# Patient Record
Sex: FEMALE | Race: OTHER | HISPANIC OR LATINO | URBAN - METROPOLITAN AREA
[De-identification: names, ages, dates, MRNs, and addresses within clinical notes are randomized per-mention and may not be internally consistent; named-entity substitution may affect disease eponyms.]

---

## 2016-05-12 RX ORDER — WARFARIN SODIUM 2.5 MG/1
1 TABLET ORAL
Qty: 0 | Refills: 0 | DISCHARGE
Start: 2016-05-12

## 2017-07-09 ENCOUNTER — INPATIENT (INPATIENT)
Facility: HOSPITAL | Age: 77
LOS: 2 days | Discharge: HOME | DRG: 392 | End: 2017-07-12
Attending: INTERNAL MEDICINE | Admitting: INTERNAL MEDICINE
Payer: COMMERCIAL

## 2017-07-09 VITALS
HEART RATE: 62 BPM | SYSTOLIC BLOOD PRESSURE: 181 MMHG | DIASTOLIC BLOOD PRESSURE: 50 MMHG | TEMPERATURE: 99 F | HEIGHT: 61 IN | RESPIRATION RATE: 20 BRPM | WEIGHT: 160.06 LBS | OXYGEN SATURATION: 98 %

## 2017-07-09 LAB
ALBUMIN SERPL ELPH-MCNC: 3.5 G/DL — SIGNIFICANT CHANGE UP (ref 3.5–5)
ANION GAP SERPL CALC-SCNC: 7 MMOL/L — SIGNIFICANT CHANGE UP (ref 5–17)
BASOPHILS # BLD AUTO: 0.1 K/UL — SIGNIFICANT CHANGE UP (ref 0–0.2)
BASOPHILS NFR BLD AUTO: 1.1 % — SIGNIFICANT CHANGE UP (ref 0–2)
BUN SERPL-MCNC: 19 MG/DL — HIGH (ref 7–18)
CALCIUM SERPL-MCNC: 8.2 MG/DL — LOW (ref 8.4–10.5)
CHLORIDE SERPL-SCNC: 106 MMOL/L — SIGNIFICANT CHANGE UP (ref 96–108)
CO2 SERPL-SCNC: 29 MMOL/L — SIGNIFICANT CHANGE UP (ref 22–31)
EOSINOPHIL # BLD AUTO: 0.2 K/UL — SIGNIFICANT CHANGE UP (ref 0–0.5)
EOSINOPHIL NFR BLD AUTO: 3.3 % — SIGNIFICANT CHANGE UP (ref 0–6)
GLUCOSE SERPL-MCNC: 89 MG/DL — SIGNIFICANT CHANGE UP (ref 70–99)
HCT VFR BLD CALC: 37.3 % — SIGNIFICANT CHANGE UP (ref 34.5–45)
HGB BLD-MCNC: 12 G/DL — SIGNIFICANT CHANGE UP (ref 11.5–15.5)
LYMPHOCYTES # BLD AUTO: 2.2 K/UL — SIGNIFICANT CHANGE UP (ref 1–3.3)
LYMPHOCYTES # BLD AUTO: 37.4 % — SIGNIFICANT CHANGE UP (ref 13–44)
MCHC RBC-ENTMCNC: 30.2 PG — SIGNIFICANT CHANGE UP (ref 27–34)
MCHC RBC-ENTMCNC: 32.2 GM/DL — SIGNIFICANT CHANGE UP (ref 32–36)
MCV RBC AUTO: 94.1 FL — SIGNIFICANT CHANGE UP (ref 80–100)
MONOCYTES # BLD AUTO: 0.4 K/UL — SIGNIFICANT CHANGE UP (ref 0–0.9)
MONOCYTES NFR BLD AUTO: 7.2 % — SIGNIFICANT CHANGE UP (ref 2–14)
NEUTROPHILS # BLD AUTO: 2.9 K/UL — SIGNIFICANT CHANGE UP (ref 1.8–7.4)
NEUTROPHILS NFR BLD AUTO: 51 % — SIGNIFICANT CHANGE UP (ref 43–77)
PLATELET # BLD AUTO: 183 K/UL — SIGNIFICANT CHANGE UP (ref 150–400)
POTASSIUM SERPL-MCNC: 4 MMOL/L — SIGNIFICANT CHANGE UP (ref 3.5–5.3)
POTASSIUM SERPL-SCNC: 4 MMOL/L — SIGNIFICANT CHANGE UP (ref 3.5–5.3)
RBC # BLD: 3.96 M/UL — SIGNIFICANT CHANGE UP (ref 3.8–5.2)
RBC # FLD: 14.1 % — SIGNIFICANT CHANGE UP (ref 10.3–14.5)
SODIUM SERPL-SCNC: 142 MMOL/L — SIGNIFICANT CHANGE UP (ref 135–145)
WBC # BLD: 5.8 K/UL — SIGNIFICANT CHANGE UP (ref 3.8–10.5)
WBC # FLD AUTO: 5.8 K/UL — SIGNIFICANT CHANGE UP (ref 3.8–10.5)

## 2017-07-09 PROCEDURE — 71010: CPT | Mod: 26

## 2017-07-09 NOTE — ED ADULT NURSE NOTE - OBJECTIVE STATEMENT
Pt states that she is having chest pain, sob that has been ongoing for the past 3-4 days. denies numbness and tingling on arms. HX bronchitis

## 2017-07-10 DIAGNOSIS — R06.02 SHORTNESS OF BREATH: ICD-10-CM

## 2017-07-10 DIAGNOSIS — I82.409 ACUTE EMBOLISM AND THROMBOSIS OF UNSPECIFIED DEEP VEINS OF UNSPECIFIED LOWER EXTREMITY: ICD-10-CM

## 2017-07-10 DIAGNOSIS — I10 ESSENTIAL (PRIMARY) HYPERTENSION: ICD-10-CM

## 2017-07-10 DIAGNOSIS — E11.9 TYPE 2 DIABETES MELLITUS WITHOUT COMPLICATIONS: ICD-10-CM

## 2017-07-10 DIAGNOSIS — Z29.9 ENCOUNTER FOR PROPHYLACTIC MEASURES, UNSPECIFIED: ICD-10-CM

## 2017-07-10 DIAGNOSIS — R05 COUGH: ICD-10-CM

## 2017-07-10 LAB
24R-OH-CALCIDIOL SERPL-MCNC: 24.6 NG/ML — LOW (ref 30–100)
ALP SERPL-CCNC: 75 U/L — SIGNIFICANT CHANGE UP (ref 40–120)
ALT FLD-CCNC: 21 U/L DA — SIGNIFICANT CHANGE UP (ref 10–60)
APTT BLD: 44.4 SEC — HIGH (ref 27.5–37.4)
AST SERPL-CCNC: 20 U/L — SIGNIFICANT CHANGE UP (ref 10–40)
BILIRUB SERPL-MCNC: 0.3 MG/DL — SIGNIFICANT CHANGE UP (ref 0.2–1.2)
CHOLEST SERPL-MCNC: 167 MG/DL — SIGNIFICANT CHANGE UP (ref 10–199)
CK MB BLD-MCNC: 1.4 % — SIGNIFICANT CHANGE UP (ref 0–3.5)
CK MB BLD-MCNC: 1.5 % — SIGNIFICANT CHANGE UP (ref 0–3.5)
CK MB CFR SERPL CALC: 1.4 NG/ML — SIGNIFICANT CHANGE UP (ref 0–3.6)
CK MB CFR SERPL CALC: 1.8 NG/ML — SIGNIFICANT CHANGE UP (ref 0–3.6)
CK SERPL-CCNC: 103 U/L — SIGNIFICANT CHANGE UP (ref 21–215)
CK SERPL-CCNC: 117 U/L — SIGNIFICANT CHANGE UP (ref 21–215)
CREAT SERPL-MCNC: 1.21 MG/DL — SIGNIFICANT CHANGE UP (ref 0.5–1.3)
FOLATE SERPL-MCNC: >20 NG/ML — SIGNIFICANT CHANGE UP (ref 4.8–24.2)
HBA1C BLD-MCNC: 7.1 % — HIGH (ref 4–5.6)
HDLC SERPL-MCNC: 20 MG/DL — LOW (ref 40–125)
INR BLD: 2.23 RATIO — HIGH (ref 0.88–1.16)
LIPID PNL WITH DIRECT LDL SERPL: 69 MG/DL — SIGNIFICANT CHANGE UP
NT-PROBNP SERPL-SCNC: 144 PG/ML — SIGNIFICANT CHANGE UP (ref 0–450)
PROT SERPL-MCNC: 7.3 G/DL — SIGNIFICANT CHANGE UP (ref 6–8.3)
PROTHROM AB SERPL-ACNC: 24.7 SEC — HIGH (ref 9.8–12.7)
RAPID RVP RESULT: SIGNIFICANT CHANGE UP
TOTAL CHOLESTEROL/HDL RATIO MEASUREMENT: 8.4 RATIO — HIGH (ref 3.3–7.1)
TRIGL SERPL-MCNC: 388 MG/DL — HIGH (ref 10–149)
TROPONIN I SERPL-MCNC: 0.04 NG/ML — SIGNIFICANT CHANGE UP (ref 0–0.04)
TROPONIN I SERPL-MCNC: 0.04 NG/ML — SIGNIFICANT CHANGE UP (ref 0–0.04)
TSH SERPL-MCNC: 1.5 UU/ML — SIGNIFICANT CHANGE UP (ref 0.34–4.82)
VIT B12 SERPL-MCNC: 202 PG/ML — LOW (ref 243–894)

## 2017-07-10 PROCEDURE — 71275 CT ANGIOGRAPHY CHEST: CPT | Mod: 26

## 2017-07-10 PROCEDURE — 99285 EMERGENCY DEPT VISIT HI MDM: CPT

## 2017-07-10 PROCEDURE — 99223 1ST HOSP IP/OBS HIGH 75: CPT | Mod: GC

## 2017-07-10 RX ORDER — HYDROCHLOROTHIAZIDE 25 MG
25 TABLET ORAL DAILY
Qty: 0 | Refills: 0 | Status: DISCONTINUED | OUTPATIENT
Start: 2017-07-10 | End: 2017-07-12

## 2017-07-10 RX ORDER — HYDROCHLOROTHIAZIDE 25 MG
25 TABLET ORAL ONCE
Qty: 0 | Refills: 0 | Status: COMPLETED | OUTPATIENT
Start: 2017-07-10 | End: 2017-07-10

## 2017-07-10 RX ORDER — METOPROLOL TARTRATE 50 MG
25 TABLET ORAL DAILY
Qty: 0 | Refills: 0 | Status: DISCONTINUED | OUTPATIENT
Start: 2017-07-10 | End: 2017-07-12

## 2017-07-10 RX ORDER — PREGABALIN 225 MG/1
1000 CAPSULE ORAL DAILY
Qty: 0 | Refills: 0 | Status: DISCONTINUED | OUTPATIENT
Start: 2017-07-10 | End: 2017-07-11

## 2017-07-10 RX ORDER — ASPIRIN/CALCIUM CARB/MAGNESIUM 324 MG
81 TABLET ORAL DAILY
Qty: 0 | Refills: 0 | Status: DISCONTINUED | OUTPATIENT
Start: 2017-07-10 | End: 2017-07-12

## 2017-07-10 RX ORDER — WARFARIN SODIUM 2.5 MG/1
4 TABLET ORAL ONCE
Qty: 0 | Refills: 0 | Status: COMPLETED | OUTPATIENT
Start: 2017-07-10 | End: 2017-07-10

## 2017-07-10 RX ORDER — INSULIN LISPRO 100/ML
VIAL (ML) SUBCUTANEOUS
Qty: 0 | Refills: 0 | Status: DISCONTINUED | OUTPATIENT
Start: 2017-07-10 | End: 2017-07-12

## 2017-07-10 RX ORDER — GABAPENTIN 400 MG/1
300 CAPSULE ORAL DAILY
Qty: 0 | Refills: 0 | Status: DISCONTINUED | OUTPATIENT
Start: 2017-07-10 | End: 2017-07-12

## 2017-07-10 RX ORDER — IPRATROPIUM/ALBUTEROL SULFATE 18-103MCG
3 AEROSOL WITH ADAPTER (GRAM) INHALATION ONCE
Qty: 0 | Refills: 0 | Status: COMPLETED | OUTPATIENT
Start: 2017-07-10 | End: 2017-07-10

## 2017-07-10 RX ORDER — PANTOPRAZOLE SODIUM 20 MG/1
40 TABLET, DELAYED RELEASE ORAL
Qty: 0 | Refills: 0 | Status: DISCONTINUED | OUTPATIENT
Start: 2017-07-10 | End: 2017-07-12

## 2017-07-10 RX ORDER — PREGABALIN 225 MG/1
1000 CAPSULE ORAL DAILY
Qty: 0 | Refills: 0 | Status: DISCONTINUED | OUTPATIENT
Start: 2017-07-10 | End: 2017-07-12

## 2017-07-10 RX ADMIN — Medication 3 MILLILITER(S): at 02:15

## 2017-07-10 RX ADMIN — Medication 100 MILLIGRAM(S): at 11:17

## 2017-07-10 RX ADMIN — Medication 25 MILLIGRAM(S): at 11:11

## 2017-07-10 RX ADMIN — GABAPENTIN 300 MILLIGRAM(S): 400 CAPSULE ORAL at 11:12

## 2017-07-10 RX ADMIN — Medication 2: at 12:20

## 2017-07-10 RX ADMIN — Medication 100 MILLIGRAM(S): at 17:48

## 2017-07-10 RX ADMIN — Medication 81 MILLIGRAM(S): at 11:12

## 2017-07-10 RX ADMIN — Medication 1: at 17:48

## 2017-07-10 RX ADMIN — WARFARIN SODIUM 4 MILLIGRAM(S): 2.5 TABLET ORAL at 21:16

## 2017-07-10 RX ADMIN — Medication 25 MILLIGRAM(S): at 03:01

## 2017-07-10 RX ADMIN — PANTOPRAZOLE SODIUM 40 MILLIGRAM(S): 20 TABLET, DELAYED RELEASE ORAL at 11:11

## 2017-07-10 NOTE — H&P ADULT - GASTROINTESTINAL DETAILS
no rigidity/no guarding/no bruit/no rebound tenderness/bowel sounds normal/nontender/no organomegaly/no distention/no masses palpable/normal/soft

## 2017-07-10 NOTE — ED PROVIDER NOTE - OBJECTIVE STATEMENT
"I'm having trouble breathing"  76 year old with complaint of three days of non productive cough, asst with sob, hx of bronchitis/pneumonia about one year ago with similar symptoms,  pt states smoked 40 years ago.  no fever no chills, no PND, no orthopnea, SOB with exertion , no leg pain or edema.  had dvt in the past on coumadin

## 2017-07-10 NOTE — H&P ADULT - RS GEN PE MLT RESP DETAILS PC
breath sounds equal/no wheezes/no subcutaneous emphysema/normal/no rales/clear to auscultation bilaterally/no chest wall tenderness/airway patent/no rhonchi/respirations non-labored/no intercostal retractions/good air movement

## 2017-07-10 NOTE — ED PROVIDER NOTE - PSH
S/P appendectomy    S/P colon resection    S/P eye surgery, follow-up exam  right eye  S/P partial hysterectomy    Total knee replacement status  left

## 2017-07-10 NOTE — H&P ADULT - NEUROLOGICAL DETAILS
sensation intact/deep reflexes intact/no spontaneous movement/normal strength/alert and oriented x 3/cranial nerves intact/superficial reflexes intact

## 2017-07-10 NOTE — ED PROVIDER NOTE - PMH
Deep vein thrombosis (DVT), right    DM (diabetes mellitus)    HLD (hyperlipidemia)    HTN (hypertension)    Knee pain, chronic, right    Neuropathy of lower extremity    RA (rheumatoid arthritis)    Reflux

## 2017-07-10 NOTE — H&P ADULT - NSHPLABSRESULTS_GEN_ALL_CORE
12.0   5.8   )-----------( 183      ( 09 Jul 2017 23:34 )             37.3       07-09    142  |  106  |  19<H>  ----------------------------<  89  4.0   |  29  |  1.21    Ca    8.2<L>      09 Jul 2017 23:34    TPro  7.3  /  Alb  3.5  /  TBili  0.3  /  DBili  x   /  AST  20  /  ALT  21  /  AlkPhos  75  07-09                  PT/INR - ( 10 Jul 2017 02:26 )   PT: 24.7 sec;   INR: 2.23 ratio         PTT - ( 10 Jul 2017 02:26 )  PTT:44.4 sec        CARDIAC MARKERS ( 09 Jul 2017 23:34 )  0.038 ng/mL / x     / 117 U/L / x     / 1.8 ng/mL        CAPILLARY BLOOD GLUCOSE: 90    RAPID RVP RESULT: NON REACTIVE    CT ANGIO CHEST W/ IV CONTRAST:    FINDINGS:    CHEST:     LUNGS AND LARGE AIRWAYS: Patent central airways. No pulmonary   consolidation or nodules. Calcified granuloma in the right lower lobe  PLEURA: No pleural effusion.  VESSELS: No evidence for pulmonary embolism.  HEART: Heart size is normal.No pericardial effusion.  MEDIASTINUM AND CAT: No lymphadenopathy.  CHEST WALL AND LOWER NECK: Right thyroid lobe hypodense lesion measures   4.0 x 3.8 cm and exerts mild leftward deviation of the trachea.  VISUALIZED UPPER ABDOMEN: Calcified granulomata in the spleen.  BONES: Degenerative changes of the spine.    IMPRESSION: No evidence for pulmonary embolism. EKG: sinus bradycardia 56 bpm    12.0   5.8   )-----------( 183      ( 09 Jul 2017 23:34 )             37.3       07-09    142  |  106  |  19<H>  ----------------------------<  89  4.0   |  29  |  1.21    Ca    8.2<L>      09 Jul 2017 23:34    TPro  7.3  /  Alb  3.5  /  TBili  0.3  /  DBili  x   /  AST  20  /  ALT  21  /  AlkPhos  75  07-09                  PT/INR - ( 10 Jul 2017 02:26 )   PT: 24.7 sec;   INR: 2.23 ratio         PTT - ( 10 Jul 2017 02:26 )  PTT:44.4 sec        CARDIAC MARKERS ( 09 Jul 2017 23:34 )  0.038 ng/mL / x     / 117 U/L / x     / 1.8 ng/mL        CAPILLARY BLOOD GLUCOSE: 90    RAPID RVP RESULT: NON REACTIVE    CT ANGIO CHEST W/ IV CONTRAST:    FINDINGS:    CHEST:     LUNGS AND LARGE AIRWAYS: Patent central airways. No pulmonary   consolidation or nodules. Calcified granuloma in the right lower lobe  PLEURA: No pleural effusion.  VESSELS: No evidence for pulmonary embolism.  HEART: Heart size is normal.No pericardial effusion.  MEDIASTINUM AND CAT: No lymphadenopathy.  CHEST WALL AND LOWER NECK: Right thyroid lobe hypodense lesion measures   4.0 x 3.8 cm and exerts mild leftward deviation of the trachea.  VISUALIZED UPPER ABDOMEN: Calcified granulomata in the spleen.  BONES: Degenerative changes of the spine.    IMPRESSION: No evidence for pulmonary embolism.

## 2017-07-10 NOTE — PROGRESS NOTE ADULT - SUBJECTIVE AND OBJECTIVE BOX
HPI:  77 yo F who comes to ED with son, PMHx of DVT right leg, currently on Coumadin (for one year) HTN, DMT2, RA, diverticulosis s/p colectomy, HLD, GERD c/o shortness of breath and non-productive cough for the past 4-5 days. Patient states waking up coughing which gets worse throughout the day, nothing alleviates it. Associated with orthopnea. Patient had a similar episode last year, for which was diagnosed with bronchitis. She is compliant with medications. Patient used to smoke tobacco 50 yrs ago. Refers being up to date on vaccinations: Influenza and Pneumovax.    Denies fever, chills, chest pain, hemoptysis, nausea, vomiting, paroxysmal nocturnal dyspnea, hematochezia, trauma, fall, current smoking, alcohol use, illicit drug use.     Vital Signs Last 24 Hrs    T(F): 98.4 (09 Jul 2017 23:56), Max: 98.8 (09 Jul 2017 21:53)  HR: 63 (10 Jul 2017 02:16) (60 - 63)  BP: 175/68 (10 Jul 2017 02:16) (173/91 - 181/50)  RR: 20 (09 Jul 2017 23:56) (20 - 20)  SpO2: 100% (09 Jul 2017 23:56) (98% - 100%) (10 Jul 2017 04:59)      Patient is a 76y old  Female who presents with a chief complaint of non productive cough with shortness of breath (10 Jul 2017 04:59)      INTERVAL HPI/OVERNIGHT EVENTS:  T(C): 36.6 (07-10-17 @ 16:39), Max: 37.1 (07-09-17 @ 21:53)  HR: 51 (07-10-17 @ 16:39) (51 - 63)  BP: 136/61 (07-10-17 @ 16:39) (112/67 - 181/50)  RR: 18 (07-10-17 @ 16:39) (16 - 20)  SpO2: 100% (07-10-17 @ 16:39) (98% - 100%)  Wt(kg): --  I&O's Summary      REVIEW OF SYSTEMS: denies fever, chills, SOB, palpitations, chest pain, abdominal pain, nausea, vomitting, diarrhea, constipation, dizziness    MEDICATIONS  (STANDING):    ED Provider note lists Cozaar 100mg daily as medication      aspirin enteric coated 81 milliGRAM(s) Oral daily  gabapentin 300 milliGRAM(s) Oral daily  metoprolol succinate ER 25 milliGRAM(s) Oral daily  hydrochlorothiazide 25 milliGRAM(s) Oral daily  warfarin 4 milliGRAM(s) Oral once  insulin lispro (HumaLOG) corrective regimen sliding scale   SubCutaneous three times a day before meals  guaiFENesin   Syrup  (Sugar-Free) 100 milliGRAM(s) Oral every 6 hours  pantoprazole    Tablet 40 milliGRAM(s) Oral before breakfast  cyanocobalamin 1000 MICROGram(s) Oral daily  cyanocobalamin Injectable 1000 MICROGram(s) SubCutaneous daily    MEDICATIONS  (PRN):      PHYSICAL EXAM:  GENERAL: NAD, well-groomed, well-developed  HEAD:  Atraumatic, Normocephalic  EYES: EOMI, PERRLA, conjunctiva and sclera clear  ENMT: No tonsillar erythema, exudates, or enlargement; Moist mucous membranes, Good dentition, No lesions  NECK: Supple, No JVD, Normal thyroid  NERVOUS SYSTEM:  Alert & Oriented X3, Good concentration; Motor Strength 5/5 B/L upper and lower extremities; DTRs 2+ intact and symmetric  CHEST/LUNG: Clear to percussion bilaterally; No rales, rhonchi, wheezing, or rubs  HEART: Regular rate and rhythm; No murmurs, rubs, or gallops  ABDOMEN: Soft, Nontender, Nondistended; Bowel sounds present  EXTREMITIES:  2+ Peripheral Pulses, No clubbing, cyanosis, or edema  LYMPH: No lymphadenopathy noted  SKIN: No rashes or lesions  LABS:                        12.0   5.8   )-----------( 183      ( 09 Jul 2017 23:34 )             37.3     07-09    142  |  106  |  19<H>  ----------------------------<  89  4.0   |  29  |  1.21    Ca    8.2<L>      09 Jul 2017 23:34    TPro  7.3  /  Alb  3.5  /  TBili  0.3  /  DBili  x   /  AST  20  /  ALT  21  /  AlkPhos  75  07-09    PT/INR - ( 10 Jul 2017 02:26 )   PT: 24.7 sec;   INR: 2.23 ratio         PTT - ( 10 Jul 2017 02:26 )  PTT:44.4 sec    CAPILLARY BLOOD GLUCOSE  157 (10 Jul 2017 16:39)  217 (10 Jul 2017 11:29)  129 (10 Jul 2017 07:43)

## 2017-07-10 NOTE — H&P ADULT - HISTORY OF PRESENT ILLNESS
77 yo F who comes to ED with son, c/o shortness of breath and non-productive cough for the past 4-5 days. Patient states waking up coughing which gets worse throughout the day, nothing alleviates it. Has been sleeping with four pillows ever since. Patient had a similar episode last year, for which was diagnosed with bronchitis. PMHx of DVT, currently on Coumadin treatment. Denies fever, chills, chest pain, hemoptysis, nausea, vomiting. Patient used to smoke tobacco 50 yrs ago. Refers being up to date on vaccinations: Influenza and Pneumovax.    PAST MEDICAL & SURGICAL HISTORY:    Deep vein thrombosis (DVT), right  RA (rheumatoid arthritis)  Neuropathy of lower extremity  HLD (hyperlipidemia)  GERD  HTN (hypertension)  DMT2  Diverticulosis  Pulmonary Tuberculosis  S/P partial hysterectomy  Bilateral Total knee replacement  S/P appendectomy  S/P colon resection  S/P eye surgery, follow-up exam: right eye  Does not recall last time colonoscopy was done      FAMILY HISTORY:  DMT2: Parents and siblings  Cardiac disease: Parents   Stroke: Brother  CA: Sister  No family hx of bleeding disorder    Social History:    Marital Status:   Occupation:   Lives with:   Ambulates at home:    Substance Use:  Tobacco Usage:    Alcohol Usage:  Illicit Drug Usage:    Health Management     Last physical exam:  For female:   Last Mammo: 2016 (non remarkable)  Last Pap: 2013 (non remarkable)       Immunization Hx: Flu, Pneumonia, Tetanus, Hepatitis, Varicella. 77 yo F who comes to ED with son, PMHx of DVT right leg, currently on Coumadin (for one year) HTN, DMT2, RA, diverticulosis s/p colectomy, HLD, GERD c/o shortness of breath and non-productive cough for the past 4-5 days. Patient states waking up coughing which gets worse throughout the day, nothing alleviates it. Associated with orthopnea. Patient had a similar episode last year, for which was diagnosed with bronchitis. She is compliant with medications. Patient used to smoke tobacco 50 yrs ago. Refers being up to date on vaccinations: Influenza and Pneumovax.    Denies fever, chills, chest pain, hemoptysis, nausea, vomiting, paroxysmal nocturnal dyspnea, hematochezia, trauma, fall, current smoking, alcohol use, illicit drug use.     Vital Signs Last 24 Hrs    T(F): 98.4 (09 Jul 2017 23:56), Max: 98.8 (09 Jul 2017 21:53)  HR: 63 (10 Jul 2017 02:16) (60 - 63)  BP: 175/68 (10 Jul 2017 02:16) (173/91 - 181/50)  RR: 20 (09 Jul 2017 23:56) (20 - 20)  SpO2: 100% (09 Jul 2017 23:56) (98% - 100%)

## 2017-07-10 NOTE — H&P ADULT - PROBLEM SELECTOR PLAN 1
Likely secondary to GERD  Concerns for bronchitis  CTA chest negative for PE, shows calcified granuloma in right lower lobe  afebrile no leukocytosis  given one dose of levofloxacin in ED  will start patient on Robitussin

## 2017-07-10 NOTE — ED ADULT NURSE REASSESSMENT NOTE - NS ED NURSE REASSESS COMMENT FT1
Called lab- requested troponin and ckmb to be added.
assumed care of pt from previous RN Guille in stable condition. pt a&ox3, resting comfortably on stretcher in NAD. offers no complaints at this time. pending bed availability on unit.
Pt axox3 ambulatory gait steady no acute distress pain noted

## 2017-07-10 NOTE — ED PROVIDER NOTE - CARE PLAN
Principal Discharge DX:	Shortness of breath  Secondary Diagnosis:	DM (diabetes mellitus)  Secondary Diagnosis:	HTN (hypertension)

## 2017-07-10 NOTE — H&P ADULT - PROBLEM SELECTOR PLAN 2
CXR is negative for congestion or infiltrates  Saturating above 94% on room air CXR is negative for congestion or infiltrates  Saturating above 94% on room air  EKG: sinus bradycardia 56 bpm  CE negative x 2

## 2017-07-11 DIAGNOSIS — E04.9 NONTOXIC GOITER, UNSPECIFIED: ICD-10-CM

## 2017-07-11 DIAGNOSIS — E78.5 HYPERLIPIDEMIA, UNSPECIFIED: ICD-10-CM

## 2017-07-11 DIAGNOSIS — K21.9 GASTRO-ESOPHAGEAL REFLUX DISEASE WITHOUT ESOPHAGITIS: ICD-10-CM

## 2017-07-11 LAB
ALBUMIN SERPL ELPH-MCNC: 3.3 G/DL — LOW (ref 3.5–5)
ALP SERPL-CCNC: 72 U/L — SIGNIFICANT CHANGE UP (ref 40–120)
ALT FLD-CCNC: 19 U/L DA — SIGNIFICANT CHANGE UP (ref 10–60)
ANION GAP SERPL CALC-SCNC: 9 MMOL/L — SIGNIFICANT CHANGE UP (ref 5–17)
AST SERPL-CCNC: 17 U/L — SIGNIFICANT CHANGE UP (ref 10–40)
BILIRUB DIRECT SERPL-MCNC: 0.1 MG/DL — SIGNIFICANT CHANGE UP (ref 0–0.2)
BILIRUB INDIRECT FLD-MCNC: 0.2 MG/DL — SIGNIFICANT CHANGE UP (ref 0.2–1)
BILIRUB SERPL-MCNC: 0.3 MG/DL — SIGNIFICANT CHANGE UP (ref 0.2–1.2)
BUN SERPL-MCNC: 19 MG/DL — HIGH (ref 7–18)
CALCIUM SERPL-MCNC: 8.3 MG/DL — LOW (ref 8.4–10.5)
CHLORIDE SERPL-SCNC: 104 MMOL/L — SIGNIFICANT CHANGE UP (ref 96–108)
CO2 SERPL-SCNC: 28 MMOL/L — SIGNIFICANT CHANGE UP (ref 22–31)
CREAT SERPL-MCNC: 1.17 MG/DL — SIGNIFICANT CHANGE UP (ref 0.5–1.3)
GLUCOSE SERPL-MCNC: 165 MG/DL — HIGH (ref 70–99)
HCT VFR BLD CALC: 36.9 % — SIGNIFICANT CHANGE UP (ref 34.5–45)
HGB BLD-MCNC: 12.2 G/DL — SIGNIFICANT CHANGE UP (ref 11.5–15.5)
INR BLD: 1.62 RATIO — HIGH (ref 0.88–1.16)
MAGNESIUM SERPL-MCNC: 1.8 MG/DL — SIGNIFICANT CHANGE UP (ref 1.6–2.6)
MCHC RBC-ENTMCNC: 31.1 PG — SIGNIFICANT CHANGE UP (ref 27–34)
MCHC RBC-ENTMCNC: 33 GM/DL — SIGNIFICANT CHANGE UP (ref 32–36)
MCV RBC AUTO: 94.2 FL — SIGNIFICANT CHANGE UP (ref 80–100)
PHOSPHATE SERPL-MCNC: 2.9 MG/DL — SIGNIFICANT CHANGE UP (ref 2.5–4.5)
PLATELET # BLD AUTO: 174 K/UL — SIGNIFICANT CHANGE UP (ref 150–400)
POTASSIUM SERPL-MCNC: 3.8 MMOL/L — SIGNIFICANT CHANGE UP (ref 3.5–5.3)
POTASSIUM SERPL-SCNC: 3.8 MMOL/L — SIGNIFICANT CHANGE UP (ref 3.5–5.3)
PROT SERPL-MCNC: 7.1 G/DL — SIGNIFICANT CHANGE UP (ref 6–8.3)
PROTHROM AB SERPL-ACNC: 17.8 SEC — HIGH (ref 9.8–12.7)
RBC # BLD: 3.92 M/UL — SIGNIFICANT CHANGE UP (ref 3.8–5.2)
RBC # FLD: 14 % — SIGNIFICANT CHANGE UP (ref 10.3–14.5)
SODIUM SERPL-SCNC: 141 MMOL/L — SIGNIFICANT CHANGE UP (ref 135–145)
WBC # BLD: 5 K/UL — SIGNIFICANT CHANGE UP (ref 3.8–10.5)
WBC # FLD AUTO: 5 K/UL — SIGNIFICANT CHANGE UP (ref 3.8–10.5)

## 2017-07-11 PROCEDURE — 99233 SBSQ HOSP IP/OBS HIGH 50: CPT | Mod: GC

## 2017-07-11 RX ORDER — WARFARIN SODIUM 2.5 MG/1
5 TABLET ORAL ONCE
Qty: 0 | Refills: 0 | Status: COMPLETED | OUTPATIENT
Start: 2017-07-11 | End: 2017-07-11

## 2017-07-11 RX ADMIN — Medication 100 MILLIGRAM(S): at 17:17

## 2017-07-11 RX ADMIN — Medication 2: at 12:02

## 2017-07-11 RX ADMIN — PREGABALIN 1000 MICROGRAM(S): 225 CAPSULE ORAL at 12:03

## 2017-07-11 RX ADMIN — Medication 100 MILLIGRAM(S): at 23:07

## 2017-07-11 RX ADMIN — Medication 25 MILLIGRAM(S): at 06:04

## 2017-07-11 RX ADMIN — Medication 1: at 08:42

## 2017-07-11 RX ADMIN — Medication 81 MILLIGRAM(S): at 13:07

## 2017-07-11 RX ADMIN — PANTOPRAZOLE SODIUM 40 MILLIGRAM(S): 20 TABLET, DELAYED RELEASE ORAL at 06:04

## 2017-07-11 RX ADMIN — WARFARIN SODIUM 5 MILLIGRAM(S): 2.5 TABLET ORAL at 21:16

## 2017-07-11 RX ADMIN — Medication 100 MILLIGRAM(S): at 06:03

## 2017-07-11 RX ADMIN — GABAPENTIN 300 MILLIGRAM(S): 400 CAPSULE ORAL at 12:03

## 2017-07-11 RX ADMIN — Medication 100 MILLIGRAM(S): at 12:03

## 2017-07-11 RX ADMIN — Medication 1: at 17:17

## 2017-07-11 NOTE — CONSULT NOTE ADULT - PROBLEM SELECTOR RECOMMENDATION 3
Ch - 167 , TG - 388   consider starting Lipitor or gemfibrozil   f/u lipid profile 3-4 months after  c/w DASH diet

## 2017-07-11 NOTE — CONSULT NOTE ADULT - ASSESSMENT
77 yo F who comes to ED with son, PMHx of DVT right leg, currently on Coumadin (for one year) HTN, DMT2, RA, diverticulosis s/p colectomy, HLD, GERD c/o shortness of breath and non-productive cough for the past 4-5 days. Pt was seen by our service for right thyroid goiter

## 2017-07-11 NOTE — CONSULT NOTE ADULT - PROBLEM SELECTOR RECOMMENDATION 4
Goal BP < 140/90 mmhg  pt's BP well controlled   c/w FLAKO meds - metoprolol and HCTZ   c/w DASH diet

## 2017-07-11 NOTE — CONSULT NOTE ADULT - SUBJECTIVE AND OBJECTIVE BOX
Patient is a 76y old  Female who presents with a chief complaint of non productive cough with shortness of breath (10 Jul 2017 04:59)      HPI:  75 yo F who comes to ED with son, PMHx of DVT right leg, currently on Coumadin (for one year) HTN, DMT2, RA, diverticulosis s/p colectomy, HLD, GERD c/o shortness of breath and non-productive cough for the past 4-5 days. Pt was seen by our service for right thyroid goiter . She is having occasional palpitations , with no weight changes  , has constipation , feeling weak recently. No skin or nail or hair changes. No throat pain. No voice changes. No heat and cold intolerance. No history of taking amiodarone or lithium . No Hx radiation or surgery to neck , No FMhx Hyperthyroidism or thyroid cancer. No abdominal pain , No headache , No vomiting , No Dysuria , No diarrhea, No joint pain , No recent hospitalization or sickness. No chest pain. non smoker , non alcoholic ( Patient used to smoke tobacco 50 yrs ago.)     Vital Signs Last 24 Hrs    T(F): 98.4 (09 Jul 2017 23:56), Max: 98.8 (09 Jul 2017 21:53)  HR: 63 (10 Jul 2017 02:16) (60 - 63)  BP: 175/68 (10 Jul 2017 02:16) (173/91 - 181/50)  RR: 20 (09 Jul 2017 23:56) (20 - 20)  SpO2: 100% (09 Jul 2017 23:56) (98% - 100%) (10 Jul 2017 04:59)      PAST MEDICAL & SURGICAL HISTORY:  Knee pain, chronic, right  Deep vein thrombosis (DVT), right  RA (rheumatoid arthritis)  Neuropathy of lower extremity  HLD (hyperlipidemia)  Reflux  HTN (hypertension)  DM (diabetes mellitus)  S/P partial hysterectomy  Total knee replacement status: left  S/P appendectomy  S/P colon resection  S/P eye surgery, follow-up exam: right eye         MEDICATIONS  (STANDING):  aspirin enteric coated 81 milliGRAM(s) Oral daily  gabapentin 300 milliGRAM(s) Oral daily  metoprolol succinate ER 25 milliGRAM(s) Oral daily  hydrochlorothiazide 25 milliGRAM(s) Oral daily  insulin lispro (HumaLOG) corrective regimen sliding scale   SubCutaneous three times a day before meals  guaiFENesin   Syrup  (Sugar-Free) 100 milliGRAM(s) Oral every 6 hours  pantoprazole    Tablet 40 milliGRAM(s) Oral before breakfast  cyanocobalamin 1000 MICROGram(s) Oral daily  cyanocobalamin Injectable 1000 MICROGram(s) SubCutaneous daily    MEDICATIONS  (PRN):      FAMILY HISTORY:  Family history of diabetes mellitus (Sibling): Type 2      SOCIAL HISTORY:  as above       REVIEW OF SYSTEMS:  CONSTITUTIONAL: No fever, weight loss, or fatigue  EYES: No eye pain, visual disturbances, or discharge  ENT:  No difficulty hearing, tinnitus, vertigo; No sinus or throat pain  NECK: No pain or stiffness  RESPIRATORY: No cough, wheezing, chills or hemoptysis; No Shortness of Breath  CARDIOVASCULAR: No chest pain, palpitations, passing out, dizziness, or leg swelling  GASTROINTESTINAL: No abdominal or epigastric pain. No nausea, vomiting, or hematemesis; No diarrhea or constipation. No melena or hematochezia.  GENITOURINARY: No dysuria, frequency, hematuria, or incontinence  NEUROLOGICAL: No headaches, memory loss, loss of strength, numbness, or tremors  SKIN: No itching, burning, rashes, or lesions   LYMPH Nodes: No enlarged glands  ENDOCRINE: No heat or cold intolerance; No hair loss  MUSCULOSKELETAL: No joint pain or swelling; No muscle, back, or extremity pain  PSYCHIATRIC: No depression, anxiety, mood swings, or difficulty sleeping  HEME/LYMPH: No easy bruising, or bleeding gums  ALLERGY AND IMMUNOLOGIC: No hives or eczema	        Vital Signs Last 24 Hrs  T(C): 36.6 (11 Jul 2017 07:50), Max: 36.9 (10 Jul 2017 16:02)  T(F): 97.8 (11 Jul 2017 07:50), Max: 98.4 (10 Jul 2017 16:02)  HR: 52 (11 Jul 2017 07:50) (51 - 70)  BP: 126/57 (11 Jul 2017 07:50) (120/52 - 148/60)  BP(mean): --  RR: 16 (11 Jul 2017 07:50) (16 - 18)  SpO2: 98% (11 Jul 2017 07:50) (98% - 100%)      Constitutional:      HEENT:  NC/AT, MMM, EOMI , PERRLA    Neck:  Rt thyroid nodule palpable , No JVD, bruits or thyromegaly    Respiratory:  Clear without rales or rhonchi    Cardiovascular:  RR without murmur, rub or gallop.    Gastrointestinal: Soft without hepatosplenomegaly.    Extremities: without cyanosis, clubbing or edema.    Neurological:  Oriented   x      . No gross sensory or motor defects.        LABS:                        12.2   5.0   )-----------( 174      ( 11 Jul 2017 07:10 )             36.9     07-11    141  |  104  |  19<H>  ----------------------------<  165<H>  3.8   |  28  |  1.17    Ca    8.3<L>      11 Jul 2017 07:10  Phos  2.9     07-11  Mg     1.8     07-11    TPro  7.1  /  Alb  3.3<L>  /  TBili  0.3  /  DBili  0.1  /  AST  17  /  ALT  19  /  AlkPhos  72  07-11    CARDIAC MARKERS ( 10 Jul 2017 06:27 )  0.039 ng/mL / x     / 103 U/L / x     / 1.4 ng/mL  CARDIAC MARKERS ( 09 Jul 2017 23:34 )  0.038 ng/mL / x     / 117 U/L / x     / 1.8 ng/mL      PT/INR - ( 11 Jul 2017 07:10 )   PT: 17.8 sec;   INR: 1.62 ratio         PTT - ( 10 Jul 2017 02:26 )  PTT:44.4 sec    CAPILLARY BLOOD GLUCOSE  178 (11 Jul 2017 07:54)  189 (10 Jul 2017 21:02)  157 (10 Jul 2017 16:39)  217 (10 Jul 2017 11:29)          RADIOLOGY & ADDITIONAL STUDIES:  CTA - Rt thyroid hypodense lesion - 4.0 x 3.8 cm Patient is a 76y old  Female who presents with a chief complaint of non productive cough with shortness of breath (10 Jul 2017 04:59)      HPI:  77 yo F who comes to ED with son, PMHx of DVT right leg, currently on Coumadin (for one year) HTN, DMT2, RA, diverticulosis s/p colectomy, HLD, GERD c/o shortness of breath and non-productive cough for the past 4-5 days. Pt was seen by our service for right thyroid goiter . She is having occasional palpitations , with no weight changes  , has constipation , feeling weak recently. No skin or nail or hair changes. No throat pain. No voice changes. No heat and cold intolerance. No history of taking amiodarone or lithium . No Hx radiation or surgery to neck , No FMhx Hyperthyroidism or thyroid cancer. No abdominal pain , No headache , No vomiting , No Dysuria , No diarrhea, No joint pain , No recent hospitalization or sickness. No chest pain. non smoker , non alcoholic ( Patient used to smoke tobacco 50 yrs ago.)     Vital Signs Last 24 Hrs    T(F): 98.4 (09 Jul 2017 23:56), Max: 98.8 (09 Jul 2017 21:53)  HR: 63 (10 Jul 2017 02:16) (60 - 63)  BP: 175/68 (10 Jul 2017 02:16) (173/91 - 181/50)  RR: 20 (09 Jul 2017 23:56) (20 - 20)  SpO2: 100% (09 Jul 2017 23:56) (98% - 100%) (10 Jul 2017 04:59)      PAST MEDICAL & SURGICAL HISTORY:  Knee pain, chronic, right  Deep vein thrombosis (DVT), right  RA (rheumatoid arthritis)  Neuropathy of lower extremity  HLD (hyperlipidemia)  Reflux  HTN (hypertension)  DM (diabetes mellitus)  S/P partial hysterectomy  Total knee replacement status: left  S/P appendectomy  S/P colon resection  S/P eye surgery, follow-up exam: right eye         MEDICATIONS  (STANDING):  aspirin enteric coated 81 milliGRAM(s) Oral daily  gabapentin 300 milliGRAM(s) Oral daily  metoprolol succinate ER 25 milliGRAM(s) Oral daily  hydrochlorothiazide 25 milliGRAM(s) Oral daily  insulin lispro (HumaLOG) corrective regimen sliding scale   SubCutaneous three times a day before meals  guaiFENesin   Syrup  (Sugar-Free) 100 milliGRAM(s) Oral every 6 hours  pantoprazole    Tablet 40 milliGRAM(s) Oral before breakfast  cyanocobalamin 1000 MICROGram(s) Oral daily  cyanocobalamin Injectable 1000 MICROGram(s) SubCutaneous daily    MEDICATIONS  (PRN):      FAMILY HISTORY:  Family history of diabetes mellitus (Sibling): Type 2      SOCIAL HISTORY:  as above       REVIEW OF SYSTEMS:  CONSTITUTIONAL: No fever, weight loss, or fatigue  EYES: No eye pain, visual disturbances, or discharge  ENT:  No difficulty hearing, tinnitus, vertigo; No sinus or throat pain  NECK: No pain or stiffness  RESPIRATORY: No cough, wheezing, chills or hemoptysis; No Shortness of Breath  CARDIOVASCULAR: No chest pain, palpitations, passing out, dizziness, or leg swelling  GASTROINTESTINAL: No abdominal or epigastric pain. No nausea, vomiting, or hematemesis; No diarrhea or constipation. No melena or hematochezia.  GENITOURINARY: No dysuria, frequency, hematuria, or incontinence  NEUROLOGICAL: No headaches, memory loss, loss of strength, numbness, or tremors  SKIN: No itching, burning, rashes, or lesions   LYMPH Nodes: No enlarged glands  ENDOCRINE: No heat or cold intolerance; No hair loss  MUSCULOSKELETAL: No joint pain or swelling; No muscle, back, or extremity pain  PSYCHIATRIC: No depression, anxiety, mood swings, or difficulty sleeping  HEME/LYMPH: No easy bruising, or bleeding gums  ALLERGY AND IMMUNOLOGIC: No hives or eczema	        Vital Signs Last 24 Hrs  T(C): 36.6 (11 Jul 2017 07:50), Max: 36.9 (10 Jul 2017 16:02)  T(F): 97.8 (11 Jul 2017 07:50), Max: 98.4 (10 Jul 2017 16:02)  HR: 52 (11 Jul 2017 07:50) (51 - 70)  BP: 126/57 (11 Jul 2017 07:50) (120/52 - 148/60)  BP(mean): --  RR: 16 (11 Jul 2017 07:50) (16 - 18)  SpO2: 98% (11 Jul 2017 07:50) (98% - 100%)      Constitutional:      HEENT:  NC/AT, MMM, EOMI , PERRLA    Neck:  Rt thyroid nodule palpable , No JVD, bruits or thyromegaly    Respiratory:  Clear without rales or rhonchi    Cardiovascular:  RR without murmur, rub or gallop.    Gastrointestinal: Soft without hepatosplenomegaly.    Extremities: without cyanosis, clubbing or edema.    Neurological:  Oriented   x   3   . No gross sensory or motor defects.        LABS:                        12.2   5.0   )-----------( 174      ( 11 Jul 2017 07:10 )             36.9     07-11    141  |  104  |  19<H>  ----------------------------<  165<H>  3.8   |  28  |  1.17    Ca    8.3<L>      11 Jul 2017 07:10  Phos  2.9     07-11  Mg     1.8     07-11    TPro  7.1  /  Alb  3.3<L>  /  TBili  0.3  /  DBili  0.1  /  AST  17  /  ALT  19  /  AlkPhos  72  07-11    CARDIAC MARKERS ( 10 Jul 2017 06:27 )  0.039 ng/mL / x     / 103 U/L / x     / 1.4 ng/mL  CARDIAC MARKERS ( 09 Jul 2017 23:34 )  0.038 ng/mL / x     / 117 U/L / x     / 1.8 ng/mL      PT/INR - ( 11 Jul 2017 07:10 )   PT: 17.8 sec;   INR: 1.62 ratio         PTT - ( 10 Jul 2017 02:26 )  PTT:44.4 sec    CAPILLARY BLOOD GLUCOSE  178 (11 Jul 2017 07:54)  189 (10 Jul 2017 21:02)  157 (10 Jul 2017 16:39)  217 (10 Jul 2017 11:29)          RADIOLOGY & ADDITIONAL STUDIES:  CTA - Rt thyroid hypodense lesion - 4.0 x 3.8 cm Patient is a 76y old  Female who presents with a chief complaint of non productive cough with shortness of breath (10 Jul 2017 04:59)      HPI:  75 yo F who comes to ED with son, PMHx of DVT right leg, currently on Coumadin (for one year) HTN, DMT2, RA, diverticulosis s/p colectomy, HLD, GERD c/o shortness of breath and non-productive cough for the past 4-5 days. Pt was seen by our service for right thyroid goiter . She is having occasional palpitations , with no weight changes  , has constipation , feeling weak recently. No skin or nail or hair changes. No throat pain. No voice changes. No heat and cold intolerance. No history of taking amiodarone or lithium . No Hx radiation or surgery to neck , No FMhx Hyperthyroidism or thyroid cancer. No abdominal pain , No headache , No vomiting , No Dysuria , No diarrhea, No joint pain , No recent hospitalization or sickness. No chest pain. non smoker , non alcoholic ( Patient used to smoke tobacco 50 yrs ago.)   Note - pt had hx Rt thyroid goiter , she had biopsy ( FNAC ) done 1.5 year ago , but was non diagnostic due to not having enough sample and she is going f/u new o/p endocrinologist on 31st july 2017 for further work up.     Vital Signs Last 24 Hrs    T(F): 98.4 (09 Jul 2017 23:56), Max: 98.8 (09 Jul 2017 21:53)  HR: 63 (10 Jul 2017 02:16) (60 - 63)  BP: 175/68 (10 Jul 2017 02:16) (173/91 - 181/50)  RR: 20 (09 Jul 2017 23:56) (20 - 20)  SpO2: 100% (09 Jul 2017 23:56) (98% - 100%) (10 Jul 2017 04:59)      PAST MEDICAL & SURGICAL HISTORY:  Knee pain, chronic, right  Deep vein thrombosis (DVT), right  RA (rheumatoid arthritis)  Neuropathy of lower extremity  HLD (hyperlipidemia)  Reflux  HTN (hypertension)  DM (diabetes mellitus)  S/P partial hysterectomy  Total knee replacement status: left  S/P appendectomy  S/P colon resection  S/P eye surgery, follow-up exam: right eye         MEDICATIONS  (STANDING):  aspirin enteric coated 81 milliGRAM(s) Oral daily  gabapentin 300 milliGRAM(s) Oral daily  metoprolol succinate ER 25 milliGRAM(s) Oral daily  hydrochlorothiazide 25 milliGRAM(s) Oral daily  insulin lispro (HumaLOG) corrective regimen sliding scale   SubCutaneous three times a day before meals  guaiFENesin   Syrup  (Sugar-Free) 100 milliGRAM(s) Oral every 6 hours  pantoprazole    Tablet 40 milliGRAM(s) Oral before breakfast  cyanocobalamin 1000 MICROGram(s) Oral daily  cyanocobalamin Injectable 1000 MICROGram(s) SubCutaneous daily    MEDICATIONS  (PRN):      FAMILY HISTORY:  Family history of diabetes mellitus (Sibling): Type 2      SOCIAL HISTORY:  as above       REVIEW OF SYSTEMS:  CONSTITUTIONAL: No fever, weight loss, or fatigue  EYES: No eye pain, visual disturbances, or discharge  ENT:  No difficulty hearing, tinnitus, vertigo; No sinus or throat pain  NECK: No pain or stiffness  RESPIRATORY: No cough, wheezing, chills or hemoptysis; No Shortness of Breath  CARDIOVASCULAR: No chest pain, palpitations, passing out, dizziness, or leg swelling  GASTROINTESTINAL: No abdominal or epigastric pain. No nausea, vomiting, or hematemesis; No diarrhea or constipation. No melena or hematochezia.  GENITOURINARY: No dysuria, frequency, hematuria, or incontinence  NEUROLOGICAL: No headaches, memory loss, loss of strength, numbness, or tremors  SKIN: No itching, burning, rashes, or lesions   LYMPH Nodes: No enlarged glands  ENDOCRINE: No heat or cold intolerance; No hair loss  MUSCULOSKELETAL: No joint pain or swelling; No muscle, back, or extremity pain  PSYCHIATRIC: No depression, anxiety, mood swings, or difficulty sleeping  HEME/LYMPH: No easy bruising, or bleeding gums  ALLERGY AND IMMUNOLOGIC: No hives or eczema	        Vital Signs Last 24 Hrs  T(C): 36.6 (11 Jul 2017 07:50), Max: 36.9 (10 Jul 2017 16:02)  T(F): 97.8 (11 Jul 2017 07:50), Max: 98.4 (10 Jul 2017 16:02)  HR: 52 (11 Jul 2017 07:50) (51 - 70)  BP: 126/57 (11 Jul 2017 07:50) (120/52 - 148/60)  BP(mean): --  RR: 16 (11 Jul 2017 07:50) (16 - 18)  SpO2: 98% (11 Jul 2017 07:50) (98% - 100%)      Constitutional:      HEENT:  NC/AT, MMM, EOMI , PERRLA    Neck:  Rt thyroid nodule palpable , No JVD, bruits or thyromegaly    Respiratory:  Clear without rales or rhonchi    Cardiovascular:  RR without murmur, rub or gallop.    Gastrointestinal: Soft without hepatosplenomegaly.    Extremities: without cyanosis, clubbing or edema.    Neurological:  Oriented   x      . No gross sensory or motor defects.        LABS:                        12.2   5.0   )-----------( 174      ( 11 Jul 2017 07:10 )             36.9     07-11    141  |  104  |  19<H>  ----------------------------<  165<H>  3.8   |  28  |  1.17    Ca    8.3<L>      11 Jul 2017 07:10  Phos  2.9     07-11  Mg     1.8     07-11    TPro  7.1  /  Alb  3.3<L>  /  TBili  0.3  /  DBili  0.1  /  AST  17  /  ALT  19  /  AlkPhos  72  07-11    CARDIAC MARKERS ( 10 Jul 2017 06:27 )  0.039 ng/mL / x     / 103 U/L / x     / 1.4 ng/mL  CARDIAC MARKERS ( 09 Jul 2017 23:34 )  0.038 ng/mL / x     / 117 U/L / x     / 1.8 ng/mL      PT/INR - ( 11 Jul 2017 07:10 )   PT: 17.8 sec;   INR: 1.62 ratio         PTT - ( 10 Jul 2017 02:26 )  PTT:44.4 sec    CAPILLARY BLOOD GLUCOSE  178 (11 Jul 2017 07:54)  189 (10 Jul 2017 21:02)  157 (10 Jul 2017 16:39)  217 (10 Jul 2017 11:29)          RADIOLOGY & ADDITIONAL STUDIES:  CTA - Rt thyroid hypodense lesion - 4.0 x 3.8 cm

## 2017-07-11 NOTE — CONSULT NOTE ADULT - PROBLEM SELECTOR RECOMMENDATION 2
decent control  restart janumet upon d/c  fsg ac and hs  consider low dose lantus if fsg >180 HbA1c - 7.1  considering her age pt's goal HbA1c ( 7.0 - 7.5 )  agree with low dos corrective Humalog scale and resume PO meds on discharge   HbA1c and endo f/u 3-4 months   c/w diabetic diet

## 2017-07-11 NOTE — PROGRESS NOTE ADULT - ASSESSMENT
75 yo F who comes to ED with son, PMHx of DVT right leg, currently on Coumadin (for one year) HTN, DMT2, RA, diverticulosis s/p colectomy, HLD, GERD c/o shortness of breath and non-productive cough for the past 4-5 days. Patient states waking up coughing which gets worse throughout the day, nothing alleviates it. Associated with orthopnea. Patient had a similar episode last year, for which was diagnosed with bronchitis. She is compliant with medications. Patient used to smoke tobacco 50 yrs ago. Refers being up to date on vaccinations: Influenza and Pneumovax.  Denies fever, chills, chest pain, hemoptysis, nausea, vomiting, paroxysmal nocturnal dyspnea, hematochezia, trauma, fall, current smoking, alcohol use, illicit drug use.

## 2017-07-11 NOTE — CONSULT NOTE ADULT - PROBLEM SELECTOR RECOMMENDATION 9
euthyroid goiter  obtain ultrasound of thyroid gland CTA showing - Rt thyroid goiter size - 4.0 x 3.8 cm   had dx few years ago , known to pt , not on any meds  had FNAC done which was non diagnostic, did not f/u afterwards  Pt has occasional palpitations , constipation and ankle swelling ,weakness and myopathic pain   but calling it euthyroid goiter   TSH - 1.50   consider US thyroid for better visualization of goiter   o/p endo f/u for biopsy and further work up

## 2017-07-11 NOTE — CONSULT NOTE ADULT - SUBJECTIVE AND OBJECTIVE BOX
HISTORY OF PRESENT ILLNESS: HPI:  75 yo F who comes to ED with son, PMHx of DVT right leg, currently on Coumadin (for one year) HTN, DMT2, RA, diverticulosis s/p colectomy, HLD, GERD c/o shortness of breath and non-productive cough for the past 4-5 days. Patient states waking up coughing which gets worse throughout the day, nothing alleviates it. Associated with orthopnea. Patient had a similar episode last year, for which was diagnosed with bronchitis. She is compliant with medications. Patient used to smoke tobacco 50 yrs ago. Refers being up to date on vaccinations: Influenza and Pneumovax.    Denies fever, chills, chest pain, hemoptysis, nausea, vomiting, paroxysmal nocturnal dyspnea, hematochezia, trauma, fall, current smoking, alcohol use, illicit drug use.     Vital Signs Last 24 Hrs    T(F): 98.4 (09 Jul 2017 23:56), Max: 98.8 (09 Jul 2017 21:53)  HR: 63 (10 Jul 2017 02:16) (60 - 63)  BP: 175/68 (10 Jul 2017 02:16) (173/91 - 181/50)  RR: 20 (09 Jul 2017 23:56) (20 - 20)  SpO2: 100% (09 Jul 2017 23:56) (98% - 100%) (10 Jul 2017 04:59)      PAST MEDICAL & SURGICAL HISTORY:  Knee pain, chronic, right  Deep vein thrombosis (DVT), right  RA (rheumatoid arthritis)  Neuropathy of lower extremity  HLD (hyperlipidemia)  Reflux  HTN (hypertension)  DM (diabetes mellitus)  S/P partial hysterectomy  Total knee replacement status: left  S/P appendectomy  S/P colon resection  S/P eye surgery, follow-up exam: right eye          MEDICATIONS:  MEDICATIONS  (STANDING):  aspirin enteric coated 81 milliGRAM(s) Oral daily  gabapentin 300 milliGRAM(s) Oral daily  metoprolol succinate ER 25 milliGRAM(s) Oral daily  hydrochlorothiazide 25 milliGRAM(s) Oral daily  insulin lispro (HumaLOG) corrective regimen sliding scale   SubCutaneous three times a day before meals  guaiFENesin   Syrup  (Sugar-Free) 100 milliGRAM(s) Oral every 6 hours  pantoprazole    Tablet 40 milliGRAM(s) Oral before breakfast  cyanocobalamin 1000 MICROGram(s) Oral daily      Allergies    No Known Allergies    Intolerances        FAMILY HISTORY:  Family history of diabetes mellitus (Sibling): Type 2    Non-contributary for premature coronary disease or sudden cardiac death    SOCIAL HISTORY:    [X ] Non-smoker  [ ] Smoker  [ ] Alcohol      REVIEW OF SYSTEMS:  [ ]chest pain  [ x ]shortness of breath  [  ]palpitations  [  ]syncope  [ ]near syncope [ ]upper extremity weakness   [ ] lower extremity weakness  [  ]diplopia  [  ]altered mental status   [  ]fevers  [ ]chills [ ]nausea  [ ]vomitting  [  ]dysphagia    [ ]abdominal pain  [ ]melena  [ ]BRBPR    [  ]epistaxis  [  ]rash    [ ]lower extremity edema        [x ] All others negative	  [ ] Unable to obtain    PHYSICAL EXAM:  T(C): 36.6 (07-11-17 @ 07:50), Max: 36.9 (07-10-17 @ 16:02)  HR: 52 (07-11-17 @ 07:50) (51 - 70)  BP: 126/57 (07-11-17 @ 07:50) (120/52 - 138/52)  RR: 16 (07-11-17 @ 07:50) (16 - 18)  SpO2: 98% (07-11-17 @ 07:50) (98% - 100%)  Wt(kg): --  I&O's Summary        HEENT:   Normal oral mucosa, PERRL, EOMI	  Lymphatic: No obvious lymphadenopathy , no edema  Cardiovascular: Normal S1 S2, No JVD,  1/6 ALIA murmur , Peripheral pulses palpable 2+ bilaterally  Respiratory: Lungs clear to auscultation, normal effort 	  Gastrointestinal:  Soft, Non-tender, + BS	  Skin: No rashes, No cyanosis, warm to touch  Musculoskeletal: Normal range of motion, normal strength  Psychiatry:  Appropriate Mood & affect     TELEMETRY: 	OFF    ECG:  	NSR 56 BPM   RADIOLOGY:      CXR: no infiltrates       	  LABS:	 	    CARDIAC MARKERS:  CARDIAC MARKERS ( 10 Jul 2017 06:27 )  0.039 ng/mL / x     / 103 U/L / x     / 1.4 ng/mL  CARDIAC MARKERS ( 09 Jul 2017 23:34 )  0.038 ng/mL / x     / 117 U/L / x     / 1.8 ng/mL                              12.2   5.0   )-----------( 174      ( 11 Jul 2017 07:10 )             36.9     Hb Trend: 12.2<--    07-11    141  |  104  |  19<H>  ----------------------------<  165<H>  3.8   |  28  |  1.17    Ca    8.3<L>      11 Jul 2017 07:10  Phos  2.9     07-11  Mg     1.8     07-11    TPro  7.1  /  Alb  3.3<L>  /  TBili  0.3  /  DBili  0.1  /  AST  17  /  ALT  19  /  AlkPhos  72  07-11    Creatinine Trend: 1.17<--, 1.21<--    Coags:  PT/INR - ( 11 Jul 2017 07:10 )   PT: 17.8 sec;   INR: 1.62 ratio           ASSESSMENT/PLAN: 	76y Female HTN, DM, dyslipidemia, remote DVT on warfarin admitted with SOB.    - She has no infiltrates on xray, no evidence of CHF.  Will need to exclude ischemic heart disease plan for stress in AM  - lovenox for INR <2  - f/u echo    I once again thank you for allowing me to participate in the care of our mutual patient.  If you have any questions or concerns please do not hesitate to contact me.    Johnny Langford MD, [facility]  [facility], [facility]  [address] [place] [address]  PHONE:  [phone]  BEEPER : [phone]

## 2017-07-11 NOTE — PROGRESS NOTE ADULT - SUBJECTIVE AND OBJECTIVE BOX
Patient is a 76y old  Female who presents with a chief complaint of non productive cough with shortness of breath (10 Jul 2017 04:59)      INTERVAL HPI/OVERNIGHT EVENTS: Patient has no new complain.    MEDICATIONS  (STANDING):  aspirin enteric coated 81 milliGRAM(s) Oral daily  gabapentin 300 milliGRAM(s) Oral daily  metoprolol succinate ER 25 milliGRAM(s) Oral daily  hydrochlorothiazide 25 milliGRAM(s) Oral daily  insulin lispro (HumaLOG) corrective regimen sliding scale   SubCutaneous three times a day before meals  guaiFENesin   Syrup  (Sugar-Free) 100 milliGRAM(s) Oral every 6 hours  pantoprazole    Tablet 40 milliGRAM(s) Oral before breakfast  cyanocobalamin 1000 MICROGram(s) Oral daily  warfarin 5 milliGRAM(s) Oral once    MEDICATIONS  (PRN):      Allergies    No Known Allergies    Intolerances        REVIEW OF SYSTEMS:  CONSTITUTIONAL: No fever, weight loss, or fatigue  RESPIRATORY: No cough, wheezing, chills or hemoptysis; No shortness of breath  CARDIOVASCULAR: No chest pain, palpitations, dizziness, or leg swelling  GASTROINTESTINAL: No abdominal or epigastric pain. No nausea, vomiting, or hematemesis; No diarrhea or constipation. No melena or hematochezia.  NEUROLOGICAL: No headaches, memory loss, loss of strength, numbness, or tremors  SKIN: No itching, burning, rashes, or lesions     Vital Signs Last 24 Hrs  T(C): 36.9 (11 Jul 2017 15:58), Max: 36.9 (11 Jul 2017 15:58)  T(F): 98.5 (11 Jul 2017 15:58), Max: 98.5 (11 Jul 2017 15:58)  HR: 54 (11 Jul 2017 15:58) (52 - 70)  BP: 128/51 (11 Jul 2017 15:58) (126/57 - 138/52)  BP(mean): --  RR: 18 (11 Jul 2017 15:58) (16 - 18)  SpO2: 97% (11 Jul 2017 15:58) (97% - 98%)    PHYSICAL EXAM:  GENERAL: NAD, well-groomed, well-developed  HEAD:  Atraumatic, Normocephalic  EYES: EOMI, PERRLA, conjunctiva and sclera clear  NECK: Supple, No JVD, Normal thyroid  CHEST/LUNG: Clear to percussion bilaterally; No rales, rhonchi, wheezing, or rubs  HEART: Regular rate and rhythm; No murmurs, rubs, or gallops  ABDOMEN: Soft, Nontender, Nondistended; Bowel sounds present  NERVOUS SYSTEM:  Alert & Oriented X3, Good concentration; Motor Strength 5/5 B/L   EXTREMITIES:  2+ Peripheral Pulses, No clubbing, cyanosis, or edema  SKIN;    LABS:                        12.2   5.0   )-----------( 174      ( 11 Jul 2017 07:10 )             36.9     07-11    141  |  104  |  19<H>  ----------------------------<  165<H>  3.8   |  28  |  1.17    Ca    8.3<L>      11 Jul 2017 07:10  Phos  2.9     07-11  Mg     1.8     07-11    TPro  7.1  /  Alb  3.3<L>  /  TBili  0.3  /  DBili  0.1  /  AST  17  /  ALT  19  /  AlkPhos  72  07-11    PT/INR - ( 11 Jul 2017 07:10 )   PT: 17.8 sec;   INR: 1.62 ratio         PTT - ( 10 Jul 2017 02:26 )  PTT:44.4 sec    CAPILLARY BLOOD GLUCOSE  186 (11 Jul 2017 15:58)  236 (11 Jul 2017 11:34)  178 (11 Jul 2017 07:54)  189 (10 Jul 2017 21:02)          RADIOLOGY & ADDITIONAL TESTS:    Imaging Personally Reviewed:  [x ] YES  [ ] NO    Consultant(s) Notes Reviewed:  [x ] YES  [ ] NO

## 2017-07-12 VITALS
OXYGEN SATURATION: 98 % | RESPIRATION RATE: 17 BRPM | SYSTOLIC BLOOD PRESSURE: 119 MMHG | HEART RATE: 59 BPM | TEMPERATURE: 99 F | DIASTOLIC BLOOD PRESSURE: 59 MMHG

## 2017-07-12 LAB
ALBUMIN SERPL ELPH-MCNC: 3.7 G/DL — SIGNIFICANT CHANGE UP (ref 3.5–5)
ALP SERPL-CCNC: 105 U/L — SIGNIFICANT CHANGE UP (ref 40–120)
ALT FLD-CCNC: 25 U/L DA — SIGNIFICANT CHANGE UP (ref 10–60)
ANION GAP SERPL CALC-SCNC: 8 MMOL/L — SIGNIFICANT CHANGE UP (ref 5–17)
AST SERPL-CCNC: 27 U/L — SIGNIFICANT CHANGE UP (ref 10–40)
BILIRUB SERPL-MCNC: 0.3 MG/DL — SIGNIFICANT CHANGE UP (ref 0.2–1.2)
BUN SERPL-MCNC: 19 MG/DL — HIGH (ref 7–18)
CALCIUM SERPL-MCNC: 8.4 MG/DL — SIGNIFICANT CHANGE UP (ref 8.4–10.5)
CHLORIDE SERPL-SCNC: 101 MMOL/L — SIGNIFICANT CHANGE UP (ref 96–108)
CO2 SERPL-SCNC: 29 MMOL/L — SIGNIFICANT CHANGE UP (ref 22–31)
CREAT SERPL-MCNC: 1.22 MG/DL — SIGNIFICANT CHANGE UP (ref 0.5–1.3)
GLUCOSE SERPL-MCNC: 223 MG/DL — HIGH (ref 70–99)
HCT VFR BLD CALC: 42.7 % — SIGNIFICANT CHANGE UP (ref 34.5–45)
HGB BLD-MCNC: 13.9 G/DL — SIGNIFICANT CHANGE UP (ref 11.5–15.5)
INR BLD: 1.52 RATIO — HIGH (ref 0.88–1.16)
MCHC RBC-ENTMCNC: 30.9 PG — SIGNIFICANT CHANGE UP (ref 27–34)
MCHC RBC-ENTMCNC: 32.5 GM/DL — SIGNIFICANT CHANGE UP (ref 32–36)
MCV RBC AUTO: 95.1 FL — SIGNIFICANT CHANGE UP (ref 80–100)
PLATELET # BLD AUTO: 186 K/UL — SIGNIFICANT CHANGE UP (ref 150–400)
POTASSIUM SERPL-MCNC: 3.7 MMOL/L — SIGNIFICANT CHANGE UP (ref 3.5–5.3)
POTASSIUM SERPL-SCNC: 3.7 MMOL/L — SIGNIFICANT CHANGE UP (ref 3.5–5.3)
PROT SERPL-MCNC: 8.1 G/DL — SIGNIFICANT CHANGE UP (ref 6–8.3)
PROTHROM AB SERPL-ACNC: 16.7 SEC — HIGH (ref 9.8–12.7)
RBC # BLD: 4.49 M/UL — SIGNIFICANT CHANGE UP (ref 3.8–5.2)
RBC # FLD: 14.7 % — HIGH (ref 10.3–14.5)
SODIUM SERPL-SCNC: 138 MMOL/L — SIGNIFICANT CHANGE UP (ref 135–145)
WBC # BLD: 6.5 K/UL — SIGNIFICANT CHANGE UP (ref 3.8–10.5)
WBC # FLD AUTO: 6.5 K/UL — SIGNIFICANT CHANGE UP (ref 3.8–10.5)

## 2017-07-12 PROCEDURE — 87798 DETECT AGENT NOS DNA AMP: CPT

## 2017-07-12 PROCEDURE — 82607 VITAMIN B-12: CPT

## 2017-07-12 PROCEDURE — 83880 ASSAY OF NATRIURETIC PEPTIDE: CPT

## 2017-07-12 PROCEDURE — 82746 ASSAY OF FOLIC ACID SERUM: CPT

## 2017-07-12 PROCEDURE — 82306 VITAMIN D 25 HYDROXY: CPT

## 2017-07-12 PROCEDURE — 87581 M.PNEUMON DNA AMP PROBE: CPT

## 2017-07-12 PROCEDURE — 71045 X-RAY EXAM CHEST 1 VIEW: CPT

## 2017-07-12 PROCEDURE — 71275 CT ANGIOGRAPHY CHEST: CPT

## 2017-07-12 PROCEDURE — 80053 COMPREHEN METABOLIC PANEL: CPT

## 2017-07-12 PROCEDURE — 85610 PROTHROMBIN TIME: CPT

## 2017-07-12 PROCEDURE — 84443 ASSAY THYROID STIM HORMONE: CPT

## 2017-07-12 PROCEDURE — 80061 LIPID PANEL: CPT

## 2017-07-12 PROCEDURE — 85027 COMPLETE CBC AUTOMATED: CPT

## 2017-07-12 PROCEDURE — 87486 CHLMYD PNEUM DNA AMP PROBE: CPT

## 2017-07-12 PROCEDURE — 87633 RESP VIRUS 12-25 TARGETS: CPT

## 2017-07-12 PROCEDURE — 99239 HOSP IP/OBS DSCHRG MGMT >30: CPT

## 2017-07-12 PROCEDURE — 80048 BASIC METABOLIC PNL TOTAL CA: CPT

## 2017-07-12 PROCEDURE — 93306 TTE W/DOPPLER COMPLETE: CPT

## 2017-07-12 PROCEDURE — 85730 THROMBOPLASTIN TIME PARTIAL: CPT

## 2017-07-12 PROCEDURE — A9502: CPT

## 2017-07-12 PROCEDURE — 36415 COLL VENOUS BLD VENIPUNCTURE: CPT

## 2017-07-12 PROCEDURE — 94640 AIRWAY INHALATION TREATMENT: CPT

## 2017-07-12 PROCEDURE — 93005 ELECTROCARDIOGRAM TRACING: CPT

## 2017-07-12 PROCEDURE — 83036 HEMOGLOBIN GLYCOSYLATED A1C: CPT

## 2017-07-12 PROCEDURE — 82553 CREATINE MB FRACTION: CPT

## 2017-07-12 PROCEDURE — 78452 HT MUSCLE IMAGE SPECT MULT: CPT

## 2017-07-12 PROCEDURE — 84484 ASSAY OF TROPONIN QUANT: CPT

## 2017-07-12 PROCEDURE — 93017 CV STRESS TEST TRACING ONLY: CPT

## 2017-07-12 PROCEDURE — 83735 ASSAY OF MAGNESIUM: CPT

## 2017-07-12 PROCEDURE — 99285 EMERGENCY DEPT VISIT HI MDM: CPT | Mod: 25

## 2017-07-12 PROCEDURE — G0378: CPT

## 2017-07-12 PROCEDURE — 80076 HEPATIC FUNCTION PANEL: CPT

## 2017-07-12 PROCEDURE — 82550 ASSAY OF CK (CPK): CPT

## 2017-07-12 PROCEDURE — 84100 ASSAY OF PHOSPHORUS: CPT

## 2017-07-12 RX ORDER — ENOXAPARIN SODIUM 100 MG/ML
35 INJECTION SUBCUTANEOUS
Qty: 0 | Refills: 0 | Status: DISCONTINUED | OUTPATIENT
Start: 2017-07-12 | End: 2017-07-12

## 2017-07-12 RX ORDER — PREGABALIN 225 MG/1
1 CAPSULE ORAL
Qty: 30 | Refills: 0
Start: 2017-07-12

## 2017-07-12 RX ORDER — ENOXAPARIN SODIUM 100 MG/ML
0.7 INJECTION SUBCUTANEOUS
Qty: 6 | Refills: 0 | OUTPATIENT
Start: 2017-07-12 | End: 2017-07-15

## 2017-07-12 RX ORDER — WARFARIN SODIUM 2.5 MG/1
6 TABLET ORAL ONCE
Qty: 0 | Refills: 0 | Status: DISCONTINUED | OUTPATIENT
Start: 2017-07-12 | End: 2017-07-12

## 2017-07-12 RX ORDER — PANTOPRAZOLE SODIUM 20 MG/1
1 TABLET, DELAYED RELEASE ORAL
Qty: 30 | Refills: 0 | OUTPATIENT
Start: 2017-07-12

## 2017-07-12 RX ADMIN — PANTOPRAZOLE SODIUM 40 MILLIGRAM(S): 20 TABLET, DELAYED RELEASE ORAL at 05:41

## 2017-07-12 RX ADMIN — Medication 100 MILLIGRAM(S): at 11:40

## 2017-07-12 RX ADMIN — Medication 25 MILLIGRAM(S): at 05:41

## 2017-07-12 RX ADMIN — Medication 2: at 11:41

## 2017-07-12 RX ADMIN — ENOXAPARIN SODIUM 35 MILLIGRAM(S): 100 INJECTION SUBCUTANEOUS at 17:04

## 2017-07-12 RX ADMIN — GABAPENTIN 300 MILLIGRAM(S): 400 CAPSULE ORAL at 11:40

## 2017-07-12 RX ADMIN — PREGABALIN 1000 MICROGRAM(S): 225 CAPSULE ORAL at 11:40

## 2017-07-12 RX ADMIN — Medication 2: at 17:05

## 2017-07-12 RX ADMIN — Medication 100 MILLIGRAM(S): at 05:41

## 2017-07-12 RX ADMIN — Medication 81 MILLIGRAM(S): at 11:40

## 2017-07-12 NOTE — DISCHARGE NOTE ADULT - PLAN OF CARE
resolve symptoms Please continue Protonix 1 tablet before breakfast for ---  days. control INR 2-3 Please resume home coumadin schedule - alternating between 4mg and 3mg every other day. Please use lovenox 0.7ml injections twice a day as shown to you by the nurse for the next 3 days. Please follow up at coumadin clinic to check INR as soon as possible after the lovenox is completed. bp<130/90mmHg Please take home medication for Hypertension and Please follow up with primary care physician within 2 weeks. Please continue Protonix 1 tablet before breakfast for 30 days. Please follow up with primary care physician within 2 weeks. resolve symptoms. Please follow up with outpatient endologist  for biopsy and further work up. control blood sugar. Please take home medication for diabetes and Please follow up with primary care physician within 2 weeks. Please take diabetic diet, low salt ,low fat and low cholesterol diet. Please be precaution with low blood sugar. Please take home medication for Hypertension and Please follow up with primary care physician within 2 weeks. Please take  low salt ,low fat and low cholesterol diet. Please monitor blood pressure at home.

## 2017-07-12 NOTE — PROGRESS NOTE ADULT - SUBJECTIVE AND OBJECTIVE BOX
Interval Events:  pt in nad    Allergies    No Known Allergies        Endocrine/Metabolic Medications:  insulin lispro (HumaLOG) corrective regimen sliding scale   SubCutaneous three times a day before meals      Vital Signs Last 24 Hrs  T(C): 36.8 (12 Jul 2017 07:22), Max: 36.9 (11 Jul 2017 15:58)  T(F): 98.3 (12 Jul 2017 07:22), Max: 98.5 (11 Jul 2017 15:58)  HR: 55 (12 Jul 2017 07:22) (52 - 62)  BP: 110/49 (12 Jul 2017 07:22) (110/49 - 128/51)  BP(mean): --  RR: 18 (12 Jul 2017 07:22) (18 - 18)  SpO2: 99% (12 Jul 2017 07:22) (96% - 99%)      PHYSICAL EXAM  All physical exam findings normal, except those marked:  General:	Alert, active, cooperative, NAD, well hydrated  .		[] Abnormal:  Neck		Normal: supple, no cervical adenopathy, no palpable thyroid  .		[] Abnormal:  Cardiovascular	Normal: regular rate, normal S1, S2, no murmurs  .		[] Abnormal:  Respiratory	Normal: no chest wall deformity, normal respiratory pattern, CTA B/L  .		[] Abnormal:  Abdominal	Normal: soft, ND, NT, bowel sounds present, no masses, no organomegaly  .		[] Abnormal:  		Normal normal genitalia, testes descended, circumcised/uncircumcised  .		Kevin stage:			Breast kevin:  .		Menstrual history:  .		[] Abnormal:  Extremities	Normal: FROM x4  .		[] Abnormal:  Skin		Normal: intact and not indurated, no rash, no acanthosis nigricans  .		[] Abnormal:  Neurologic	Normal: grossly intact  .		[] Abnormal:    LABS                        13.9   6.5   )-----------( 186      ( 12 Jul 2017 06:56 )             42.7                               138    |  101    |  19                  Calcium: 8.4   / iCa: x      (07-12 @ 06:56)    ----------------------------<  223       Magnesium: x                                3.7     |  29     |  1.22             Phosphorous: x        TPro  8.1    /  Alb  3.7    /  TBili  0.3    /  DBili  x      /  AST  27     /  ALT  25     /  AlkPhos  105    12 Jul 2017 06:56    CAPILLARY BLOOD GLUCOSE  204 (12 Jul 2017 11:02)  238 (11 Jul 2017 21:24)  186 (11 Jul 2017 15:58)  236 (11 Jul 2017 11:34)            Assesment/plan    Euthyroid goiter  await ultrasound  consider Fna as out pt  Dm- overall decent   restart oral meds upon d/c

## 2017-07-12 NOTE — PROGRESS NOTE ADULT - PROBLEM SELECTOR PLAN 4
Chronic.  Patient has been advised to be on lifetime anticoagulation.   We will increase her dose of warfarin and teach her to self-inject heparin until INR is therapeutic.

## 2017-07-12 NOTE — PROGRESS NOTE ADULT - PROBLEM SELECTOR PLAN 6
Euthyroid goiter.  May need FNA biopsy as outpatient.  She has an appointment with an endocrinologist.

## 2017-07-12 NOTE — DISCHARGE NOTE ADULT - CARE PLAN
Principal Discharge DX:	GERD (gastroesophageal reflux disease)  Goal:	resolve symptoms  Instructions for follow-up, activity and diet:	Please continue Protonix 1 tablet before breakfast for ---  days.  Secondary Diagnosis:	DVT (deep venous thrombosis)  Secondary Diagnosis:	HTN (hypertension)  Secondary Diagnosis:	Goiter Principal Discharge DX:	GERD (gastroesophageal reflux disease)  Goal:	resolve symptoms  Instructions for follow-up, activity and diet:	Please continue Protonix 1 tablet before breakfast for 30 days. Please follow up with primary care physician within 2 weeks.  Secondary Diagnosis:	DVT (deep venous thrombosis)  Goal:	control INR 2-3  Instructions for follow-up, activity and diet:	Please resume home coumadin schedule - alternating between 4mg and 3mg every other day. Please use lovenox 0.7ml injections twice a day as shown to you by the nurse for the next 3 days. Please follow up at coumadin clinic to check INR as soon as possible after the lovenox is completed.  Secondary Diagnosis:	HTN (hypertension)  Goal:	bp<130/90mmHg  Instructions for follow-up, activity and diet:	Please take home medication for Hypertension and Please follow up with primary care physician within 2 weeks.  Secondary Diagnosis:	Goiter Principal Discharge DX:	GERD (gastroesophageal reflux disease)  Goal:	resolve symptoms  Instructions for follow-up, activity and diet:	Please continue Protonix 1 tablet before breakfast for 30 days. Please follow up with primary care physician within 2 weeks.  Secondary Diagnosis:	DVT (deep venous thrombosis)  Goal:	control INR 2-3  Instructions for follow-up, activity and diet:	Please resume home coumadin schedule - alternating between 4mg and 3mg every other day. Please use lovenox 0.7ml injections twice a day as shown to you by the nurse for the next 3 days. Please follow up at coumadin clinic to check INR as soon as possible after the lovenox is completed.  Secondary Diagnosis:	HTN (hypertension)  Goal:	bp<130/90mmHg  Instructions for follow-up, activity and diet:	Please take home medication for Hypertension and Please follow up with primary care physician within 2 weeks. Please take  low salt ,low fat and low cholesterol diet. Please monitor blood pressure at home.  Secondary Diagnosis:	Goiter  Goal:	resolve symptoms.  Instructions for follow-up, activity and diet:	Please follow up with outpatient endologist  for biopsy and further work up.  Secondary Diagnosis:	DM (diabetes mellitus)  Goal:	control blood sugar.  Instructions for follow-up, activity and diet:	Please take home medication for diabetes and Please follow up with primary care physician within 2 weeks. Please take diabetic diet, low salt ,low fat and low cholesterol diet. Please be precaution with low blood sugar.

## 2017-07-12 NOTE — DISCHARGE NOTE ADULT - HOSPITAL COURSE
77 yo F who comes to ED with son, PMHx of DVT right leg, currently on Coumadin (for one year) HTN, DMT2, RA, diverticulosis s/p colectomy, HLD, GERD c/o shortness of breath and non-productive cough for the past 4-5 days. Patient states waking up coughing which gets worse throughout the day, nothing alleviates it. Associated with orthopnea. Patient had a similar episode last year, for which was diagnosed with bronchitis. She is compliant with medications. Patient used to smoke tobacco 50 yrs ago. Patient's cough most likely caused by GERD, treated with pronotix  and will continue to use pronotix for 30 days. Patient has shortness of breath , stress test was negative for ischemia. Patient has dvt right leg, treated with 5mg Coumadin yesterday. Because INR is 1.52 today, started lovenox for 3 days and  6mg coumadin. Patient are told to follow up at coumadin clinic to check INR as soon as possible after the lovenox is completed. Patient has HTN and DM , will continue to use home medications.  Patient's status is stable and will be discharge to home today. Discharge plan was discussed with Josephine Bañuelos  and she agreed with this discharge.

## 2017-07-12 NOTE — PROGRESS NOTE ADULT - SUBJECTIVE AND OBJECTIVE BOX
Subjective: pt seen and examined, ROS neg.    aspirin enteric coated 81 milliGRAM(s) Oral daily  gabapentin 300 milliGRAM(s) Oral daily  metoprolol succinate ER 25 milliGRAM(s) Oral daily  hydrochlorothiazide 25 milliGRAM(s) Oral daily  insulin lispro (HumaLOG) corrective regimen sliding scale   SubCutaneous three times a day before meals  guaiFENesin   Syrup  (Sugar-Free) 100 milliGRAM(s) Oral every 6 hours  pantoprazole    Tablet 40 milliGRAM(s) Oral before breakfast  cyanocobalamin 1000 MICROGram(s) Oral daily                            13.9   6.5   )-----------( 186      ( 12 Jul 2017 06:56 )             42.7       Hemoglobin: 13.9 g/dL (07-12 @ 06:56)  Hemoglobin: 12.2 g/dL (07-11 @ 07:10)  Hemoglobin: 12.0 g/dL (07-09 @ 23:34)      07-12    138  |  101  |  19<H>  ----------------------------<  223<H>  3.7   |  29  |  1.22    Ca    8.4      12 Jul 2017 06:56  Phos  2.9     07-11  Mg     1.8     07-11    TPro  8.1  /  Alb  3.7  /  TBili  0.3  /  DBili  x   /  AST  27  /  ALT  25  /  AlkPhos  105  07-12    Creatinine Trend: 1.22<--, 1.17<--, 1.21<--    COAGS: PT/INR - ( 12 Jul 2017 06:56 )   PT: 16.7 sec;   INR: 1.52 ratio        CARDIAC MARKERS ( 10 Jul 2017 06:27 )  0.039 ng/mL / x     / 103 U/L / x     / 1.4 ng/mL  CARDIAC MARKERS ( 09 Jul 2017 23:34 )  0.038 ng/mL / x     / 117 U/L / x     / 1.8 ng/mL        T(C): 36.8 (07-12-17 @ 07:22), Max: 36.9 (07-11-17 @ 15:58)  HR: 55 (07-12-17 @ 07:22) (52 - 62)  BP: 110/49 (07-12-17 @ 07:22) (110/49 - 128/51)  RR: 18 (07-12-17 @ 07:22) (18 - 18)  SpO2: 99% (07-12-17 @ 07:22) (96% - 99%)  Wt(kg): --    I&O's Summary      Daily     Daily     Appearance: Normal	  HEENT:   Normal oral mucosa, PERRL, EOMI	  Lymphatic: No lymphadenopathy , no edema  Cardiovascular: Normal S1 S2, No JVD, 1/6 ALIA  murmurs , Peripheral pulses palpable 2+ bilaterally  Respiratory: Lungs clear to auscultation, normal effort 	  Gastrointestinal:  Soft, Non-tender, + BS	  Skin: No rashes, No ecchymoses, No cyanosis, warm to touch  Musculoskeletal: Normal range of motion, normal strength  Psychiatry:  Mood & affect appropriate    TELEMETRY: 	  none  ECG:  	  RADIOLOGY:   DIAGNOSTIC TESTING:  [x ] Echocardiogram:  < from: Transthoracic Echocardiogram (07.10.17 @ 07:08) >  NCLUSIONS:  1. Normal Left Ventricular Systolic Function,  (EF = 55 to  60%)  2. Grade I diastolic dysfunction  3. RV systolic pressure is mildly increased.=32mmhg  4. There is mild tricuspid regurgitation.    ------------------------------------------------------------------------  Confirmed on  7/11/2017 - 11:59:13 by Scar Ruiz MD  ------------------------------------------------------------------------    < end of copied text >    [ ]  Catheterization:  [ ] Stress Test:    OTHER: 	        ASSESSMENT/PLAN: 	76y Female HTN, DM, dyslipidemia, remote DVT on warfarin admitted with SOB.      -cont GI / DVT prophylaxis.  -cont keep K>4, mag >2.0   - A/C with coumadin . INR 2-3   NST today.  no s/s chf   cont BB/  D/W Dr Langford

## 2017-07-12 NOTE — PROGRESS NOTE ADULT - ATTENDING COMMENTS
Patient seen and examined, agree with above assessment and plan as transcribed above.    - Echo without pertinent findings  - F/U stress test today, if no significant ischemia of stress can f/u as outpt    Johnny Langford MD, [facility]  [facility], [facility]  [address] [place] [address]  PHONE:  [phone]  BEEPER : [phone]
Hypodense thyroid mass is a new finding.  Deviation of trachea may be causing symptoms.   Plan, Endocrinology consultation.  Dr. Ruiz will see patient tomorrow.
I have seen and examined this patient and discussed the plan of care with the patient and daughter, the resident, and the cardiology consultant.   Because she has episodic SOB, cardiology has advised excluding ischemia as a cause.   Our plan is to do a stress test tomorrow.   We will continue anticoagulation post DVT and more aggressive therapy to control BP.

## 2017-07-12 NOTE — DISCHARGE NOTE ADULT - PATIENT PORTAL LINK FT
“You can access the [facility] Patient Portal, offered by [facility], by registering with the following website: [email or web address]”

## 2017-07-12 NOTE — PROGRESS NOTE ADULT - PROBLEM SELECTOR PLAN 3
BP is only moderately controlled.  Will continue to hold cozaar and control with other meds.
Moderate control.
BP is only moderately controlled.   - continue to hold cozaar and control with other meds.

## 2017-07-12 NOTE — PROGRESS NOTE ADULT - PROBLEM SELECTOR PLAN 1
Dry cough in a patient with a history of DVT.  CT pulmonary angio is negative.   Patient has been on Cozaar, which can occasionally cause cough.
Resolved.  Patient had negative CT pulmonary angio and negative nuclear stress.
Patient has acid reflex, cough is worse in AM, getting better when sit up.  -started on Protonix  -hold cozaar

## 2017-07-12 NOTE — PROGRESS NOTE ADULT - PROBLEM SELECTOR PLAN 2
Moderate control, at present.
No evidence of pulmonary embolism.  R/o CHF
PMH of DVT right leg  -increase coumadin 5 mg   -monitor PT/INR

## 2017-07-12 NOTE — PROGRESS NOTE ADULT - SUBJECTIVE AND OBJECTIVE BOX
Patient is a 76y old  Female who presents with a chief complaint of non productive cough with shortness of breath (12 Jul 2017 11:59)    HPI:  75 yo F who comes to ED with son, PMHx of DVT right leg, currently on Coumadin (for one year) HTN, DMT2, RA, diverticulosis s/p colectomy, HLD, GERD c/o shortness of breath and non-productive cough for the past 4-5 days. Patient states waking up coughing which gets worse throughout the day, nothing alleviates it. Associated with orthopnea. Patient had a similar episode last year, for which was diagnosed with bronchitis. She is compliant with medications. Patient used to smoke tobacco 50 yrs ago. Refers being up to date on vaccinations: Influenza and Pneumovax.    Denies fever, chills, chest pain, hemoptysis, nausea, vomiting, paroxysmal nocturnal dyspnea, hematochezia, trauma, fall, current smoking, alcohol use, illicit drug use.       INTERVAL HPI/OVERNIGHT EVENTS:    Patient feels better.  Discussed duration of DVT prophylaxis with her:  she has had chronic DVT and prefers to continue anticoagulation.     T(C): 36.8 (07-12-17 @ 07:22), Max: 36.9 (07-11-17 @ 15:58)  HR: 55 (07-12-17 @ 07:22) (52 - 62)  BP: 110/49 (07-12-17 @ 07:22) (110/49 - 128/51)  RR: 18 (07-12-17 @ 07:22) (18 - 18)  SpO2: 99% (07-12-17 @ 07:22) (96% - 99%)  Wt(kg): --  I&O's Summary      REVIEW OF SYSTEMS: denies fever, chills, SOB, palpitations, chest pain, abdominal pain, nausea, vomiting, diarrhea, constipation, dizziness.  All other ROS, negative.     MEDICATIONS  (STANDING):  aspirin enteric coated 81 milliGRAM(s) Oral daily  gabapentin 300 milliGRAM(s) Oral daily  metoprolol succinate ER 25 milliGRAM(s) Oral daily  hydrochlorothiazide 25 milliGRAM(s) Oral daily  insulin lispro (HumaLOG) corrective regimen sliding scale   SubCutaneous three times a day before meals  guaiFENesin   Syrup  (Sugar-Free) 100 milliGRAM(s) Oral every 6 hours  pantoprazole    Tablet 40 milliGRAM(s) Oral before breakfast  cyanocobalamin 1000 MICROGram(s) Oral daily  enoxaparin Injectable 35 milliGRAM(s) SubCutaneous two times a day  warfarin 6 milliGRAM(s) Oral once          PHYSICAL EXAM:  GENERAL: NAD  HEAD:  Atraumatic, Normocephalic  EYES: EOMI, PERRLA, conjunctiva and sclera clear  NECK: Supple, No JVD, thyroid is enlarged  CHEST/LUNG: Clear to percussion bilaterally; No rales, rhonchi, wheezing, or rubs  HEART: Regular rate and rhythm; No murmurs, rubs, or gallops  ABDOMEN: Soft, Nontender, Nondistended; Bowel sounds present  EXTREMITIES:  2+ Peripheral Pulses, No clubbing, cyanosis, or edema  NERVOUS SYSTEM:  Alert & Oriented X3, Good concentration; Motor Strength 5/5 B/L upper and lower extremities;   SKIN: No rashes or lesions  LABS:                        13.9   6.5   )-----------( 186      ( 12 Jul 2017 06:56 )             42.7     07-12  Echo:     DIMENSIONS:  Dimensions:     Normal Values:  LA:     3.2 cm    2.0 - 4.0 cm  Ao:     2.9 cm    2.0 - 3.8 cm  SEPTUM: 1.0 cm    0.6 - 1.2 cm  PWT:    0.8 cm    0.6 - 1.1 cm  LVIDd:  4.3 cm    3.0 - 5.6 cm  LVIDs:  2.9 cm    1.8 - 4.0 cm      Derived Variables:  LVMI: 72 g/m2  RWT: 0.37  Ejection Fraction Visual Estimate: 61 %    ------------------------------------------------------------------------  OBSERVATIONS:  Mitral Valve: Normal mitral valve.  Aortic Root: Normal aortic root.  Aortic Valve: Normal trileaflet aortic valve.  Left Atrium: Normal left atrium.  Left Ventricle: Normal Left Ventricular Systolic Function,  (EF = 55 to 60%) Normal left ventricular internal  dimensions and wall thicknesses. Grade I diastolic  dysfunction  Right Heart: Normal right atrium. Normal right ventricular      138  |  101  |  19<H>  ----------------------------<  223<H>  3.7   |  29  |  1.22    Ca    8.4      12 Jul 2017 06:56  Phos  2.9     07-11  Mg     1.8     07-11    TPro  8.1  /  Alb  3.7  /  TBili  0.3  /  DBili  x   /  AST  27  /  ALT  25  /  AlkPhos  105  07-12    PT/INR - ( 12 Jul 2017 06:56 )   PT: 16.7 sec;   INR: 1.52 ratio         CAPILLARY BLOOD GLUCOSE  204 (12 Jul 2017 11:02)  238 (11 Jul 2017 21:24)  186 (11 Jul 2017 15:58)        Echo    ------------------------------------------------------------------------  OBSERVATIONS:  Mitral Valve: Normal mitral valve.  Aortic Root: Normal aortic root.  Aortic Valve: Normal trileaflet aortic valve.  Left Atrium: Normal left atrium.  Left Ventricle: Normal Left Ventricular Systolic Function,  (EF = 55 to 60%) Normal left ventricular internal  dimensions and wall thicknesses. Grade I diastolic  dysfunction  Right Heart: Normal right atrium. Normal right ventricular  size and function. There is mild tricuspid regurgitation.  Normal pulmonic valve.  Pericardium/PleuraNormal pericardium with no pericardial  effusion.  Hemodynamic: RV systolic pressure is mildly  increased.=32mmhg  ------------------------------------------------------------------------  CONCLUSIONS:  1. Normal Left Ventricular Systolic Function,  (EF = 55 to  60%)  2. Grade I diastolic dysfunction  3. RV systolic pressure is mildly increased.=32mmhg  4. There is mild tricuspid regurgitation.    ------------------------------------------------------------------------  Confirmed on  7/11/2017 - 11:59:13 by Scar Ruiz MD      Nuclear Stress Test:       IMPRESSIONS:Normal Study  * Negative ECG evidence of ischemia after IV  administartion of Regadenoson.  * Myocardial Perfusion SPECT results are normal.  * No clear evidence of ischemia or infarct.  * Post-stress resting myocardial perfusion gated SPECT  imaging was performed (LVEF > 70%) with no regional wall  motion abnormalities.

## 2017-07-12 NOTE — DISCHARGE NOTE ADULT - MEDICATION SUMMARY - MEDICATIONS TO TAKE
I will START or STAY ON the medications listed below when I get home from the hospital:    aspirin 81 mg oral delayed release tablet  -- 1 tab(s) by mouth once a day  -- Indication: For HTN (hypertension)    Coumadin 4 mg oral tablet  -- 1 tab(s) by mouth every other day  -- Indication: For DVT (deep venous thrombosis)    Coumadin 3 mg oral tablet  -- 1 tab(s) by mouth every other day  -- Indication: For DVT (deep venous thrombosis)    Lovenox 80 mg/0.8 mL injectable solution  -- 0.7 milliliter(s) injectable 2 times a day  -- It is very important that you take or use this exactly as directed.  Do not skip doses or discontinue unless directed by your doctor.    -- Indication: For DVT (deep venous thrombosis)    gabapentin 300 mg oral capsule  -- 1 cap(s) by mouth once a day  -- Indication: For DM (diabetes mellitus)    Janumet XR 50 mg-500 mg oral tablet, extended release  -- 1 tab(s) by mouth once a day (in the evening)  -- Indication: For DM (diabetes mellitus)    metoprolol 25 mg oral tablet, extended release  -- 1 tab(s) by mouth once a day  -- Indication: For HTN (hypertension)    hydrochlorothiazide 25 mg oral tablet  -- 1 tab(s) by mouth once a day  -- Indication: For HTN (hypertension)    pantoprazole 40 mg oral delayed release tablet  -- 1 tab(s) by mouth once a day (before a meal)  -- Indication: For GERD (gastroesophageal reflux disease)    cyanocobalamin 1000 mcg oral tablet  -- 1 tab(s) by mouth once a day  -- Indication: For vatimin supplements

## 2017-07-17 DIAGNOSIS — E04.9 NONTOXIC GOITER, UNSPECIFIED: ICD-10-CM

## 2017-07-17 DIAGNOSIS — Z87.891 PERSONAL HISTORY OF NICOTINE DEPENDENCE: ICD-10-CM

## 2017-07-17 DIAGNOSIS — Z96.652 PRESENCE OF LEFT ARTIFICIAL KNEE JOINT: ICD-10-CM

## 2017-07-17 DIAGNOSIS — Z79.01 LONG TERM (CURRENT) USE OF ANTICOAGULANTS: ICD-10-CM

## 2017-07-17 DIAGNOSIS — I10 ESSENTIAL (PRIMARY) HYPERTENSION: ICD-10-CM

## 2017-07-17 DIAGNOSIS — M06.9 RHEUMATOID ARTHRITIS, UNSPECIFIED: ICD-10-CM

## 2017-07-17 DIAGNOSIS — I82.401 ACUTE EMBOLISM AND THROMBOSIS OF UNSPECIFIED DEEP VEINS OF RIGHT LOWER EXTREMITY: ICD-10-CM

## 2017-07-17 DIAGNOSIS — E78.5 HYPERLIPIDEMIA, UNSPECIFIED: ICD-10-CM

## 2017-07-17 DIAGNOSIS — E11.9 TYPE 2 DIABETES MELLITUS WITHOUT COMPLICATIONS: ICD-10-CM

## 2017-07-17 DIAGNOSIS — K21.9 GASTRO-ESOPHAGEAL REFLUX DISEASE WITHOUT ESOPHAGITIS: ICD-10-CM

## 2017-07-17 DIAGNOSIS — G57.90 UNSPECIFIED MONONEUROPATHY OF UNSPECIFIED LOWER LIMB: ICD-10-CM

## 2017-10-18 ENCOUNTER — EMERGENCY (EMERGENCY)
Facility: HOSPITAL | Age: 77
LOS: 1 days | Discharge: HOME | End: 2017-10-18
Attending: EMERGENCY MEDICINE
Payer: COMMERCIAL

## 2017-10-18 VITALS
SYSTOLIC BLOOD PRESSURE: 144 MMHG | TEMPERATURE: 98 F | RESPIRATION RATE: 16 BRPM | HEIGHT: 64 IN | OXYGEN SATURATION: 100 % | WEIGHT: 156.09 LBS | HEART RATE: 60 BPM | DIASTOLIC BLOOD PRESSURE: 56 MMHG

## 2017-10-18 DIAGNOSIS — M06.9 RHEUMATOID ARTHRITIS, UNSPECIFIED: ICD-10-CM

## 2017-10-18 DIAGNOSIS — E11.40 TYPE 2 DIABETES MELLITUS WITH DIABETIC NEUROPATHY, UNSPECIFIED: ICD-10-CM

## 2017-10-18 DIAGNOSIS — R00.1 BRADYCARDIA, UNSPECIFIED: ICD-10-CM

## 2017-10-18 DIAGNOSIS — Z86.718 PERSONAL HISTORY OF OTHER VENOUS THROMBOSIS AND EMBOLISM: ICD-10-CM

## 2017-10-18 DIAGNOSIS — R07.89 OTHER CHEST PAIN: ICD-10-CM

## 2017-10-18 DIAGNOSIS — E78.5 HYPERLIPIDEMIA, UNSPECIFIED: ICD-10-CM

## 2017-10-18 DIAGNOSIS — K21.9 GASTRO-ESOPHAGEAL REFLUX DISEASE WITHOUT ESOPHAGITIS: ICD-10-CM

## 2017-10-18 DIAGNOSIS — I10 ESSENTIAL (PRIMARY) HYPERTENSION: ICD-10-CM

## 2017-10-18 DIAGNOSIS — Z79.82 LONG TERM (CURRENT) USE OF ASPIRIN: ICD-10-CM

## 2017-10-18 DIAGNOSIS — Z79.01 LONG TERM (CURRENT) USE OF ANTICOAGULANTS: ICD-10-CM

## 2017-10-18 LAB
BASOPHILS # BLD AUTO: 0.1 K/UL — SIGNIFICANT CHANGE UP (ref 0–0.2)
BASOPHILS NFR BLD AUTO: 1.1 % — SIGNIFICANT CHANGE UP (ref 0–2)
EOSINOPHIL # BLD AUTO: 0.2 K/UL — SIGNIFICANT CHANGE UP (ref 0–0.5)
EOSINOPHIL NFR BLD AUTO: 3.3 % — SIGNIFICANT CHANGE UP (ref 0–6)
HCT VFR BLD CALC: 37.8 % — SIGNIFICANT CHANGE UP (ref 34.5–45)
HGB BLD-MCNC: 12.1 G/DL — SIGNIFICANT CHANGE UP (ref 11.5–15.5)
LYMPHOCYTES # BLD AUTO: 2.6 K/UL — SIGNIFICANT CHANGE UP (ref 1–3.3)
LYMPHOCYTES # BLD AUTO: 34.9 % — SIGNIFICANT CHANGE UP (ref 13–44)
MCHC RBC-ENTMCNC: 31 PG — SIGNIFICANT CHANGE UP (ref 27–34)
MCHC RBC-ENTMCNC: 32 GM/DL — SIGNIFICANT CHANGE UP (ref 32–36)
MCV RBC AUTO: 96.8 FL — SIGNIFICANT CHANGE UP (ref 80–100)
MONOCYTES # BLD AUTO: 0.4 K/UL — SIGNIFICANT CHANGE UP (ref 0–0.9)
MONOCYTES NFR BLD AUTO: 5.1 % — SIGNIFICANT CHANGE UP (ref 2–14)
NEUTROPHILS # BLD AUTO: 4.1 K/UL — SIGNIFICANT CHANGE UP (ref 1.8–7.4)
NEUTROPHILS NFR BLD AUTO: 55.5 % — SIGNIFICANT CHANGE UP (ref 43–77)
PLATELET # BLD AUTO: 190 K/UL — SIGNIFICANT CHANGE UP (ref 150–400)
RBC # BLD: 3.91 M/UL — SIGNIFICANT CHANGE UP (ref 3.8–5.2)
RBC # FLD: 14 % — SIGNIFICANT CHANGE UP (ref 10.3–14.5)
WBC # BLD: 7.4 K/UL — SIGNIFICANT CHANGE UP (ref 3.8–10.5)
WBC # FLD AUTO: 7.4 K/UL — SIGNIFICANT CHANGE UP (ref 3.8–10.5)

## 2017-10-18 PROCEDURE — 71010: CPT | Mod: 26

## 2017-10-18 PROCEDURE — 99284 EMERGENCY DEPT VISIT MOD MDM: CPT | Mod: 25

## 2017-10-18 PROCEDURE — 71020: CPT | Mod: 26

## 2017-10-19 VITALS
HEART RATE: 57 BPM | TEMPERATURE: 98 F | RESPIRATION RATE: 16 BRPM | DIASTOLIC BLOOD PRESSURE: 55 MMHG | SYSTOLIC BLOOD PRESSURE: 128 MMHG | OXYGEN SATURATION: 99 %

## 2017-10-19 LAB
ALBUMIN SERPL ELPH-MCNC: 3.5 G/DL — SIGNIFICANT CHANGE UP (ref 3.5–5)
ALP SERPL-CCNC: 81 U/L — SIGNIFICANT CHANGE UP (ref 40–120)
ALT FLD-CCNC: 29 U/L DA — SIGNIFICANT CHANGE UP (ref 10–60)
ANION GAP SERPL CALC-SCNC: 8 MMOL/L — SIGNIFICANT CHANGE UP (ref 5–17)
AST SERPL-CCNC: 29 U/L — SIGNIFICANT CHANGE UP (ref 10–40)
BILIRUB SERPL-MCNC: 0.4 MG/DL — SIGNIFICANT CHANGE UP (ref 0.2–1.2)
BUN SERPL-MCNC: 17 MG/DL — SIGNIFICANT CHANGE UP (ref 7–18)
CALCIUM SERPL-MCNC: 8.3 MG/DL — LOW (ref 8.4–10.5)
CHLORIDE SERPL-SCNC: 103 MMOL/L — SIGNIFICANT CHANGE UP (ref 96–108)
CK MB BLD-MCNC: 1.5 % — SIGNIFICANT CHANGE UP (ref 0–3.5)
CK MB CFR SERPL CALC: 1.8 NG/ML — SIGNIFICANT CHANGE UP (ref 0–3.6)
CK SERPL-CCNC: 118 U/L — SIGNIFICANT CHANGE UP (ref 21–215)
CO2 SERPL-SCNC: 28 MMOL/L — SIGNIFICANT CHANGE UP (ref 22–31)
CREAT SERPL-MCNC: 1.06 MG/DL — SIGNIFICANT CHANGE UP (ref 0.5–1.3)
GLUCOSE SERPL-MCNC: 106 MG/DL — HIGH (ref 70–99)
POTASSIUM SERPL-MCNC: 3.1 MMOL/L — LOW (ref 3.5–5.3)
POTASSIUM SERPL-SCNC: 3.1 MMOL/L — LOW (ref 3.5–5.3)
PROT SERPL-MCNC: 7.4 G/DL — SIGNIFICANT CHANGE UP (ref 6–8.3)
SODIUM SERPL-SCNC: 139 MMOL/L — SIGNIFICANT CHANGE UP (ref 135–145)
TROPONIN I SERPL-MCNC: 0.04 NG/ML — SIGNIFICANT CHANGE UP (ref 0–0.04)
TROPONIN I SERPL-MCNC: 0.04 NG/ML — SIGNIFICANT CHANGE UP (ref 0–0.04)

## 2017-10-19 PROCEDURE — 82550 ASSAY OF CK (CPK): CPT

## 2017-10-19 PROCEDURE — 71045 X-RAY EXAM CHEST 1 VIEW: CPT

## 2017-10-19 PROCEDURE — 93005 ELECTROCARDIOGRAM TRACING: CPT

## 2017-10-19 PROCEDURE — 74174 CTA ABD&PLVS W/CONTRAST: CPT

## 2017-10-19 PROCEDURE — 74174 CTA ABD&PLVS W/CONTRAST: CPT | Mod: 26

## 2017-10-19 PROCEDURE — 84484 ASSAY OF TROPONIN QUANT: CPT

## 2017-10-19 PROCEDURE — 71046 X-RAY EXAM CHEST 2 VIEWS: CPT

## 2017-10-19 PROCEDURE — 71275 CT ANGIOGRAPHY CHEST: CPT | Mod: 26

## 2017-10-19 PROCEDURE — 71275 CT ANGIOGRAPHY CHEST: CPT

## 2017-10-19 PROCEDURE — 82553 CREATINE MB FRACTION: CPT

## 2017-10-19 PROCEDURE — 80053 COMPREHEN METABOLIC PANEL: CPT

## 2017-10-19 PROCEDURE — 85027 COMPLETE CBC AUTOMATED: CPT

## 2017-10-19 PROCEDURE — 99284 EMERGENCY DEPT VISIT MOD MDM: CPT | Mod: 25

## 2017-10-19 RX ORDER — POTASSIUM CHLORIDE 20 MEQ
40 PACKET (EA) ORAL ONCE
Qty: 0 | Refills: 0 | Status: COMPLETED | OUTPATIENT
Start: 2017-10-19 | End: 2017-10-19

## 2017-10-19 RX ADMIN — Medication 40 MILLIEQUIVALENT(S): at 02:03

## 2017-10-19 NOTE — ED PROVIDER NOTE - MEDICAL DECISION MAKING DETAILS
78 y/o F pt presents with atypical chest pain with moderate risk factors. Pt has H/O DVT and on Coumadin.  Will get CT angio to r/o dissection and PE, obtain cardiac enzymes, EKG and reevaluate for disposition.

## 2017-10-19 NOTE — ED PROVIDER NOTE - CHPI ED SYMPTOMS NEG
no shortness of breath/no cough/no nausea, no vomiting, no diarrhea, no numbness, no tingling, no weakness/no chills/no fever/no diaphoresis

## 2017-10-19 NOTE — ED PROVIDER NOTE - PROGRESS NOTE DETAILS
pt with recent nuclear stress test and atypical cp. if CTA neg, will obtain 2nd set 4-hr troponin and dc with fu cards. signed out to dr rivera. labs show trop neg x2, CTA tesha for dissection  discussed above with patient, on reeval pt reports CP has resolved.  patient stable for discharge.

## 2017-10-19 NOTE — ED PROVIDER NOTE - OBJECTIVE STATEMENT
78 y/o F pt with PMHx of DVT, DM, HTN, HLD, presents to ED c/o left sided sharp chest pain that radiates to back x today and gradually worsening. Pt reports her sudden onset of chest pain developed after going for a walk. Pt denies fever, chills, diaphoresis, shortness of breath, cough, nausea, vomiting, diarrhea, numbness, tingling, weakness , or any other complaints. NKDA. 78 y/o F pt with PMHx of DVT, DM, HTN, HLD, presents to ED c/o left sided sharp chest pain that radiates to back x today and gradually worsening. Pt reports her sudden onset of chest pain developed after going for a walk. Pt denies fever, chills, diaphoresis, shortness of breath, cough, nausea, vomiting, diarrhea, numbness, tingling, weakness , or any other complaints. NKDA. Recent nuclear stress test last admission.

## 2018-02-05 ENCOUNTER — INPATIENT (INPATIENT)
Facility: HOSPITAL | Age: 78
LOS: 1 days | Discharge: HOME | DRG: 153 | End: 2018-02-07
Attending: INTERNAL MEDICINE | Admitting: INTERNAL MEDICINE
Payer: COMMERCIAL

## 2018-02-05 VITALS
HEIGHT: 64 IN | OXYGEN SATURATION: 96 % | SYSTOLIC BLOOD PRESSURE: 173 MMHG | WEIGHT: 160.06 LBS | RESPIRATION RATE: 20 BRPM | TEMPERATURE: 102 F | HEART RATE: 66 BPM | DIASTOLIC BLOOD PRESSURE: 78 MMHG

## 2018-02-05 LAB
ALBUMIN SERPL ELPH-MCNC: 3.7 G/DL — SIGNIFICANT CHANGE UP (ref 3.5–5)
ALP SERPL-CCNC: 47 U/L — SIGNIFICANT CHANGE UP (ref 40–120)
ALT FLD-CCNC: 18 U/L DA — SIGNIFICANT CHANGE UP (ref 10–60)
ANION GAP SERPL CALC-SCNC: 6 MMOL/L — SIGNIFICANT CHANGE UP (ref 5–17)
APPEARANCE UR: CLEAR — SIGNIFICANT CHANGE UP
AST SERPL-CCNC: 23 U/L — SIGNIFICANT CHANGE UP (ref 10–40)
BASOPHILS # BLD AUTO: 0.1 K/UL — SIGNIFICANT CHANGE UP (ref 0–0.2)
BASOPHILS NFR BLD AUTO: 1.3 % — SIGNIFICANT CHANGE UP (ref 0–2)
BILIRUB SERPL-MCNC: 0.4 MG/DL — SIGNIFICANT CHANGE UP (ref 0.2–1.2)
BILIRUB UR-MCNC: NEGATIVE — SIGNIFICANT CHANGE UP
BUN SERPL-MCNC: 18 MG/DL — SIGNIFICANT CHANGE UP (ref 7–18)
CALCIUM SERPL-MCNC: 8.2 MG/DL — LOW (ref 8.4–10.5)
CHLORIDE SERPL-SCNC: 101 MMOL/L — SIGNIFICANT CHANGE UP (ref 96–108)
CK SERPL-CCNC: 164 U/L — SIGNIFICANT CHANGE UP (ref 21–215)
CO2 SERPL-SCNC: 31 MMOL/L — SIGNIFICANT CHANGE UP (ref 22–31)
COLOR SPEC: YELLOW — SIGNIFICANT CHANGE UP
CREAT SERPL-MCNC: 1.23 MG/DL — SIGNIFICANT CHANGE UP (ref 0.5–1.3)
DIFF PNL FLD: ABNORMAL
EOSINOPHIL # BLD AUTO: 0 K/UL — SIGNIFICANT CHANGE UP (ref 0–0.5)
EOSINOPHIL NFR BLD AUTO: 0.2 % — SIGNIFICANT CHANGE UP (ref 0–6)
FLUAV H1 2009 PAND RNA SPEC QL NAA+PROBE: DETECTED
GLUCOSE SERPL-MCNC: 71 MG/DL — SIGNIFICANT CHANGE UP (ref 70–99)
GLUCOSE UR QL: NEGATIVE — SIGNIFICANT CHANGE UP
HCT VFR BLD CALC: 42.4 % — SIGNIFICANT CHANGE UP (ref 34.5–45)
HGB BLD-MCNC: 12.6 G/DL — SIGNIFICANT CHANGE UP (ref 11.5–15.5)
KETONES UR-MCNC: NEGATIVE — SIGNIFICANT CHANGE UP
LACTATE SERPL-SCNC: 1.5 MMOL/L — SIGNIFICANT CHANGE UP (ref 0.7–2)
LEUKOCYTE ESTERASE UR-ACNC: NEGATIVE — SIGNIFICANT CHANGE UP
LIDOCAIN IGE QN: 204 U/L — SIGNIFICANT CHANGE UP (ref 73–393)
LYMPHOCYTES # BLD AUTO: 1.4 K/UL — SIGNIFICANT CHANGE UP (ref 1–3.3)
LYMPHOCYTES # BLD AUTO: 22.1 % — SIGNIFICANT CHANGE UP (ref 13–44)
MCHC RBC-ENTMCNC: 28.4 PG — SIGNIFICANT CHANGE UP (ref 27–34)
MCHC RBC-ENTMCNC: 29.8 GM/DL — LOW (ref 32–36)
MCV RBC AUTO: 95.3 FL — SIGNIFICANT CHANGE UP (ref 80–100)
MONOCYTES # BLD AUTO: 0.6 K/UL — SIGNIFICANT CHANGE UP (ref 0–0.9)
MONOCYTES NFR BLD AUTO: 9.9 % — SIGNIFICANT CHANGE UP (ref 2–14)
NEUTROPHILS # BLD AUTO: 4.2 K/UL — SIGNIFICANT CHANGE UP (ref 1.8–7.4)
NEUTROPHILS NFR BLD AUTO: 66.5 % — SIGNIFICANT CHANGE UP (ref 43–77)
NITRITE UR-MCNC: NEGATIVE — SIGNIFICANT CHANGE UP
PH UR: 6 — SIGNIFICANT CHANGE UP (ref 5–8)
PLATELET # BLD AUTO: 151 K/UL — SIGNIFICANT CHANGE UP (ref 150–400)
POTASSIUM SERPL-MCNC: 4.1 MMOL/L — SIGNIFICANT CHANGE UP (ref 3.5–5.3)
POTASSIUM SERPL-SCNC: 4.1 MMOL/L — SIGNIFICANT CHANGE UP (ref 3.5–5.3)
PROT SERPL-MCNC: 7.8 G/DL — SIGNIFICANT CHANGE UP (ref 6–8.3)
PROT UR-MCNC: 15
RAPID RVP RESULT: DETECTED
RBC # BLD: 4.45 M/UL — SIGNIFICANT CHANGE UP (ref 3.8–5.2)
RBC # FLD: 14.1 % — SIGNIFICANT CHANGE UP (ref 10.3–14.5)
SODIUM SERPL-SCNC: 138 MMOL/L — SIGNIFICANT CHANGE UP (ref 135–145)
SP GR SPEC: 1.01 — SIGNIFICANT CHANGE UP (ref 1.01–1.02)
TROPONIN I SERPL-MCNC: 0.03 NG/ML — SIGNIFICANT CHANGE UP (ref 0–0.04)
UROBILINOGEN FLD QL: NEGATIVE — SIGNIFICANT CHANGE UP
WBC # BLD: 6.3 K/UL — SIGNIFICANT CHANGE UP (ref 3.8–10.5)
WBC # FLD AUTO: 6.3 K/UL — SIGNIFICANT CHANGE UP (ref 3.8–10.5)

## 2018-02-05 RX ORDER — ACETAMINOPHEN 500 MG
975 TABLET ORAL ONCE
Qty: 0 | Refills: 0 | Status: COMPLETED | OUTPATIENT
Start: 2018-02-05 | End: 2018-02-05

## 2018-02-05 RX ORDER — SODIUM CHLORIDE 9 MG/ML
3 INJECTION INTRAMUSCULAR; INTRAVENOUS; SUBCUTANEOUS ONCE
Qty: 0 | Refills: 0 | Status: COMPLETED | OUTPATIENT
Start: 2018-02-05 | End: 2018-02-05

## 2018-02-05 RX ORDER — SODIUM CHLORIDE 9 MG/ML
1000 INJECTION INTRAMUSCULAR; INTRAVENOUS; SUBCUTANEOUS ONCE
Qty: 0 | Refills: 0 | Status: COMPLETED | OUTPATIENT
Start: 2018-02-05 | End: 2018-02-05

## 2018-02-05 RX ADMIN — SODIUM CHLORIDE 1000 MILLILITER(S): 9 INJECTION INTRAMUSCULAR; INTRAVENOUS; SUBCUTANEOUS at 19:56

## 2018-02-05 RX ADMIN — SODIUM CHLORIDE 3 MILLILITER(S): 9 INJECTION INTRAMUSCULAR; INTRAVENOUS; SUBCUTANEOUS at 19:55

## 2018-02-05 RX ADMIN — Medication 975 MILLIGRAM(S): at 20:09

## 2018-02-05 NOTE — ED PROVIDER NOTE - OBJECTIVE STATEMENT
78 y/o F c/o cough, fever, malaise, v/d x 3 days. PMHx: DM HTN HLD PSHx: appendectomy. Pt reports she received the flu vaccine this season. Pt denies or any other complaints. NKDA.

## 2018-02-05 NOTE — ED PROVIDER NOTE - MEDICAL DECISION MAKING DETAILS
Pt is a 78 y/o F presenting with flu like illness, feeling weak and dizzy, normal EKG with NSR, admit for IV fluids, anti paretics, flu swab.

## 2018-02-06 DIAGNOSIS — J11.1 INFLUENZA DUE TO UNIDENTIFIED INFLUENZA VIRUS WITH OTHER RESPIRATORY MANIFESTATIONS: ICD-10-CM

## 2018-02-06 DIAGNOSIS — Z29.9 ENCOUNTER FOR PROPHYLACTIC MEASURES, UNSPECIFIED: ICD-10-CM

## 2018-02-06 DIAGNOSIS — E11.9 TYPE 2 DIABETES MELLITUS WITHOUT COMPLICATIONS: ICD-10-CM

## 2018-02-06 DIAGNOSIS — Z86.718 PERSONAL HISTORY OF OTHER VENOUS THROMBOSIS AND EMBOLISM: ICD-10-CM

## 2018-02-06 DIAGNOSIS — I10 ESSENTIAL (PRIMARY) HYPERTENSION: ICD-10-CM

## 2018-02-06 DIAGNOSIS — E78.5 HYPERLIPIDEMIA, UNSPECIFIED: ICD-10-CM

## 2018-02-06 LAB
ALBUMIN SERPL ELPH-MCNC: 3.1 G/DL — LOW (ref 3.5–5)
ALP SERPL-CCNC: 40 U/L — SIGNIFICANT CHANGE UP (ref 40–120)
ALT FLD-CCNC: 17 U/L DA — SIGNIFICANT CHANGE UP (ref 10–60)
ANION GAP SERPL CALC-SCNC: 8 MMOL/L — SIGNIFICANT CHANGE UP (ref 5–17)
APTT BLD: 38.5 SEC — HIGH (ref 27.5–37.4)
AST SERPL-CCNC: 20 U/L — SIGNIFICANT CHANGE UP (ref 10–40)
BASOPHILS # BLD AUTO: 0.1 K/UL — SIGNIFICANT CHANGE UP (ref 0–0.2)
BASOPHILS NFR BLD AUTO: 1.2 % — SIGNIFICANT CHANGE UP (ref 0–2)
BILIRUB SERPL-MCNC: 0.4 MG/DL — SIGNIFICANT CHANGE UP (ref 0.2–1.2)
BUN SERPL-MCNC: 13 MG/DL — SIGNIFICANT CHANGE UP (ref 7–18)
CALCIUM SERPL-MCNC: 7.5 MG/DL — LOW (ref 8.4–10.5)
CHLORIDE SERPL-SCNC: 103 MMOL/L — SIGNIFICANT CHANGE UP (ref 96–108)
CHOLEST SERPL-MCNC: 81 MG/DL — SIGNIFICANT CHANGE UP (ref 10–199)
CO2 SERPL-SCNC: 28 MMOL/L — SIGNIFICANT CHANGE UP (ref 22–31)
CREAT SERPL-MCNC: 1.13 MG/DL — SIGNIFICANT CHANGE UP (ref 0.5–1.3)
EOSINOPHIL # BLD AUTO: 0 K/UL — SIGNIFICANT CHANGE UP (ref 0–0.5)
EOSINOPHIL NFR BLD AUTO: 0.8 % — SIGNIFICANT CHANGE UP (ref 0–6)
ERYTHROCYTE [SEDIMENTATION RATE] IN BLOOD: 36 MM/HR — HIGH (ref 0–20)
GLUCOSE SERPL-MCNC: 135 MG/DL — HIGH (ref 70–99)
HBA1C BLD-MCNC: 7.2 % — HIGH (ref 4–5.6)
HCT VFR BLD CALC: 35.7 % — SIGNIFICANT CHANGE UP (ref 34.5–45)
HDLC SERPL-MCNC: 23 MG/DL — LOW (ref 40–125)
HGB BLD-MCNC: 10.8 G/DL — LOW (ref 11.5–15.5)
INR BLD: 1.66 RATIO — HIGH (ref 0.88–1.16)
LIPID PNL WITH DIRECT LDL SERPL: 39 MG/DL — SIGNIFICANT CHANGE UP
LYMPHOCYTES # BLD AUTO: 1.5 K/UL — SIGNIFICANT CHANGE UP (ref 1–3.3)
LYMPHOCYTES # BLD AUTO: 31.2 % — SIGNIFICANT CHANGE UP (ref 13–44)
MAGNESIUM SERPL-MCNC: 1.7 MG/DL — SIGNIFICANT CHANGE UP (ref 1.6–2.6)
MCHC RBC-ENTMCNC: 28.8 PG — SIGNIFICANT CHANGE UP (ref 27–34)
MCHC RBC-ENTMCNC: 30.3 GM/DL — LOW (ref 32–36)
MCV RBC AUTO: 94.9 FL — SIGNIFICANT CHANGE UP (ref 80–100)
MONOCYTES # BLD AUTO: 0.6 K/UL — SIGNIFICANT CHANGE UP (ref 0–0.9)
MONOCYTES NFR BLD AUTO: 13.3 % — SIGNIFICANT CHANGE UP (ref 2–14)
NEUTROPHILS # BLD AUTO: 2.5 K/UL — SIGNIFICANT CHANGE UP (ref 1.8–7.4)
NEUTROPHILS NFR BLD AUTO: 53.5 % — SIGNIFICANT CHANGE UP (ref 43–77)
PHOSPHATE SERPL-MCNC: 3 MG/DL — SIGNIFICANT CHANGE UP (ref 2.5–4.5)
PLATELET # BLD AUTO: 114 K/UL — LOW (ref 150–400)
POTASSIUM SERPL-MCNC: 3.7 MMOL/L — SIGNIFICANT CHANGE UP (ref 3.5–5.3)
POTASSIUM SERPL-SCNC: 3.7 MMOL/L — SIGNIFICANT CHANGE UP (ref 3.5–5.3)
PROCALCITONIN SERPL-MCNC: 0.05 NG/ML — HIGH (ref 0–0.04)
PROT SERPL-MCNC: 6.7 G/DL — SIGNIFICANT CHANGE UP (ref 6–8.3)
PROTHROM AB SERPL-ACNC: 18.3 SEC — HIGH (ref 9.8–12.7)
RBC # BLD: 3.76 M/UL — LOW (ref 3.8–5.2)
RBC # FLD: 13.7 % — SIGNIFICANT CHANGE UP (ref 10.3–14.5)
SODIUM SERPL-SCNC: 139 MMOL/L — SIGNIFICANT CHANGE UP (ref 135–145)
TOTAL CHOLESTEROL/HDL RATIO MEASUREMENT: 3.5 RATIO — SIGNIFICANT CHANGE UP (ref 3.3–7.1)
TRIGL SERPL-MCNC: 96 MG/DL — SIGNIFICANT CHANGE UP (ref 10–149)
TSH SERPL-MCNC: 1.86 UU/ML — SIGNIFICANT CHANGE UP (ref 0.34–4.82)
VIT B12 SERPL-MCNC: 186 PG/ML — LOW (ref 232–1245)
WBC # BLD: 4.7 K/UL — SIGNIFICANT CHANGE UP (ref 3.8–10.5)
WBC # FLD AUTO: 4.7 K/UL — SIGNIFICANT CHANGE UP (ref 3.8–10.5)

## 2018-02-06 PROCEDURE — 99285 EMERGENCY DEPT VISIT HI MDM: CPT

## 2018-02-06 PROCEDURE — 99223 1ST HOSP IP/OBS HIGH 75: CPT | Mod: GC

## 2018-02-06 PROCEDURE — 71045 X-RAY EXAM CHEST 1 VIEW: CPT | Mod: 26

## 2018-02-06 RX ORDER — ACETAMINOPHEN 500 MG
650 TABLET ORAL EVERY 6 HOURS
Qty: 0 | Refills: 0 | Status: DISCONTINUED | OUTPATIENT
Start: 2018-02-06 | End: 2018-02-07

## 2018-02-06 RX ORDER — INSULIN LISPRO 100/ML
VIAL (ML) SUBCUTANEOUS
Qty: 0 | Refills: 0 | Status: DISCONTINUED | OUTPATIENT
Start: 2018-02-06 | End: 2018-02-07

## 2018-02-06 RX ORDER — ATORVASTATIN CALCIUM 80 MG/1
20 TABLET, FILM COATED ORAL AT BEDTIME
Qty: 0 | Refills: 0 | Status: DISCONTINUED | OUTPATIENT
Start: 2018-02-06 | End: 2018-02-07

## 2018-02-06 RX ORDER — GABAPENTIN 400 MG/1
300 CAPSULE ORAL AT BEDTIME
Qty: 0 | Refills: 0 | Status: DISCONTINUED | OUTPATIENT
Start: 2018-02-06 | End: 2018-02-07

## 2018-02-06 RX ORDER — ASPIRIN/CALCIUM CARB/MAGNESIUM 324 MG
81 TABLET ORAL DAILY
Qty: 0 | Refills: 0 | Status: DISCONTINUED | OUTPATIENT
Start: 2018-02-06 | End: 2018-02-07

## 2018-02-06 RX ORDER — ENOXAPARIN SODIUM 100 MG/ML
40 INJECTION SUBCUTANEOUS DAILY
Qty: 0 | Refills: 0 | Status: DISCONTINUED | OUTPATIENT
Start: 2018-02-06 | End: 2018-02-06

## 2018-02-06 RX ORDER — HYDROCHLOROTHIAZIDE 25 MG
25 TABLET ORAL DAILY
Qty: 0 | Refills: 0 | Status: DISCONTINUED | OUTPATIENT
Start: 2018-02-06 | End: 2018-02-07

## 2018-02-06 RX ORDER — WARFARIN SODIUM 2.5 MG/1
4 TABLET ORAL ONCE
Qty: 0 | Refills: 0 | Status: COMPLETED | OUTPATIENT
Start: 2018-02-06 | End: 2018-02-06

## 2018-02-06 RX ORDER — METOPROLOL TARTRATE 50 MG
25 TABLET ORAL DAILY
Qty: 0 | Refills: 0 | Status: DISCONTINUED | OUTPATIENT
Start: 2018-02-06 | End: 2018-02-07

## 2018-02-06 RX ORDER — PANTOPRAZOLE SODIUM 20 MG/1
40 TABLET, DELAYED RELEASE ORAL
Qty: 0 | Refills: 0 | Status: DISCONTINUED | OUTPATIENT
Start: 2018-02-06 | End: 2018-02-07

## 2018-02-06 RX ADMIN — Medication 1: at 12:31

## 2018-02-06 RX ADMIN — Medication 25 MILLIGRAM(S): at 05:47

## 2018-02-06 RX ADMIN — Medication 100 MILLIGRAM(S): at 12:31

## 2018-02-06 RX ADMIN — Medication 100 MILLIGRAM(S): at 05:48

## 2018-02-06 RX ADMIN — Medication 75 MILLIGRAM(S): at 00:50

## 2018-02-06 RX ADMIN — PANTOPRAZOLE SODIUM 40 MILLIGRAM(S): 20 TABLET, DELAYED RELEASE ORAL at 06:17

## 2018-02-06 RX ADMIN — Medication 30 MILLIGRAM(S): at 20:41

## 2018-02-06 RX ADMIN — Medication 30 MILLIGRAM(S): at 05:47

## 2018-02-06 RX ADMIN — Medication 100 MILLIGRAM(S): at 20:40

## 2018-02-06 RX ADMIN — Medication 81 MILLIGRAM(S): at 12:31

## 2018-02-06 NOTE — H&P ADULT - ATTENDING COMMENTS
Patient seen/evaluated at bedside 2/6/2018 in the ED. I agree with the resident progress note/outlined plan of care. My independent findings and conclusions are documented.

## 2018-02-06 NOTE — H&P ADULT - HISTORY OF PRESENT ILLNESS
76 yo Female from home, lives alone with PMH of DVT right leg, currently on Coumadin, HTN, DMT2, RA, diverticulosis s/p colectomy, HLD, GERD came to ED for symptoms of runny nose, dry cough and not feeling well. Pt has these symptoms started 3 days ago. Her cough is dry. Pt also had low grade fevers at home. Pt got the Flu shot of the season. Pt denies sick contacts, recent travel, chest pain, shortness of breath, abdominal pain, problems with urine or bowel, changes in sleep or appetite.     SH: Denies use of Alcohol, smoking or other drugs    ED Course: Hemodynamically stable with fever of 102, BP of 173/78. Influenza AH3 positive. CXR looked clear. Pt got 1 liter bolus and 1 dose of Tamiflu.

## 2018-02-06 NOTE — H&P ADULT - PROBLEM SELECTOR PLAN 2
Pt takes Coumadin 4 mg daily  Monitor INR and adjust dose  Goal INR 2-3 DVT > 1 year ago  Pt takes Coumadin 4 mg daily  Monitor INR and adjust dose accordingly   Goal INR 2-3  Patient has been advised to be on lifetime anticoagulation by PMD and as per previous Charts  Primary team to call PMD for the duration of Coumadin as Pt has been on it for quite long time without recurrence.

## 2018-02-06 NOTE — H&P ADULT - ASSESSMENT
76 yo Female from home, lives alone with PMH of DVT right leg, currently on Coumadin, HTN, DMT2, RA, diverticulosis s/p colectomy, HLD, GERD came to ED for symptoms of runny nose, dry cough and not feeling well. Pt has these symptoms started 3 days ago.    In Ed, Hemodynamically stable with fever of 102, BP of 173/78. Influenza AH3 positive. CXR looked clear. Pt got 1 liter bolus and 1 dose of Tamiflu.

## 2018-02-06 NOTE — H&P ADULT - PROBLEM SELECTOR PLAN 1
Runny nose, red eyes and Dry Cough x 3 days  CXR: Clear  No Leukocytosis  Fever of 102  Influenza positive  Started Tamiflu  Droplet Isolation  F/u Blood and Urine Cx Runny nose, red eyes and Dry Cough x 3 days  CXR: Clear  No Leukocytosis  Fever of 102  Influenza positive  Started Tamiflu based on GFR  Droplet Isolation  F/u Blood and Urine Cx

## 2018-02-06 NOTE — H&P ADULT - PROBLEM SELECTOR PLAN 6
Improve score: 5 VTE ppx with Coumadin  [x] Previous VTE                                                3  [] Thrombophilia                                             2  [] Lower limb paralysis                                   2    [] Current Cancer                                             2   [x] Immobilization > 24 hrs                              1  [] ICU/CCU stay > 24 hours                             1  [x] Age > 60                                                         1

## 2018-02-07 ENCOUNTER — TRANSCRIPTION ENCOUNTER (OUTPATIENT)
Age: 78
End: 2018-02-07

## 2018-02-07 VITALS
DIASTOLIC BLOOD PRESSURE: 50 MMHG | TEMPERATURE: 99 F | OXYGEN SATURATION: 98 % | SYSTOLIC BLOOD PRESSURE: 138 MMHG | HEART RATE: 62 BPM | RESPIRATION RATE: 16 BRPM

## 2018-02-07 LAB
ANION GAP SERPL CALC-SCNC: 8 MMOL/L — SIGNIFICANT CHANGE UP (ref 5–17)
BUN SERPL-MCNC: 21 MG/DL — HIGH (ref 7–18)
CALCIUM SERPL-MCNC: 8.1 MG/DL — LOW (ref 8.4–10.5)
CHLORIDE SERPL-SCNC: 104 MMOL/L — SIGNIFICANT CHANGE UP (ref 96–108)
CO2 SERPL-SCNC: 30 MMOL/L — SIGNIFICANT CHANGE UP (ref 22–31)
CREAT SERPL-MCNC: 1.23 MG/DL — SIGNIFICANT CHANGE UP (ref 0.5–1.3)
GLUCOSE SERPL-MCNC: 138 MG/DL — HIGH (ref 70–99)
HCT VFR BLD CALC: 37.6 % — SIGNIFICANT CHANGE UP (ref 34.5–45)
HGB BLD-MCNC: 11.2 G/DL — LOW (ref 11.5–15.5)
INR BLD: 1.39 RATIO — HIGH (ref 0.88–1.16)
MAGNESIUM SERPL-MCNC: 1.9 MG/DL — SIGNIFICANT CHANGE UP (ref 1.6–2.6)
MCHC RBC-ENTMCNC: 28.5 PG — SIGNIFICANT CHANGE UP (ref 27–34)
MCHC RBC-ENTMCNC: 29.8 GM/DL — LOW (ref 32–36)
MCV RBC AUTO: 95.6 FL — SIGNIFICANT CHANGE UP (ref 80–100)
PLATELET # BLD AUTO: 129 K/UL — LOW (ref 150–400)
POTASSIUM SERPL-MCNC: 4.1 MMOL/L — SIGNIFICANT CHANGE UP (ref 3.5–5.3)
POTASSIUM SERPL-SCNC: 4.1 MMOL/L — SIGNIFICANT CHANGE UP (ref 3.5–5.3)
PROTHROM AB SERPL-ACNC: 15.3 SEC — HIGH (ref 9.8–12.7)
RBC # BLD: 3.93 M/UL — SIGNIFICANT CHANGE UP (ref 3.8–5.2)
RBC # FLD: 13.7 % — SIGNIFICANT CHANGE UP (ref 10.3–14.5)
SODIUM SERPL-SCNC: 142 MMOL/L — SIGNIFICANT CHANGE UP (ref 135–145)
WBC # BLD: 4.1 K/UL — SIGNIFICANT CHANGE UP (ref 3.8–10.5)
WBC # FLD AUTO: 4.1 K/UL — SIGNIFICANT CHANGE UP (ref 3.8–10.5)

## 2018-02-07 PROCEDURE — 82962 GLUCOSE BLOOD TEST: CPT

## 2018-02-07 PROCEDURE — 99285 EMERGENCY DEPT VISIT HI MDM: CPT | Mod: 25

## 2018-02-07 PROCEDURE — 85027 COMPLETE CBC AUTOMATED: CPT

## 2018-02-07 PROCEDURE — 85610 PROTHROMBIN TIME: CPT

## 2018-02-07 PROCEDURE — 83605 ASSAY OF LACTIC ACID: CPT

## 2018-02-07 PROCEDURE — 83690 ASSAY OF LIPASE: CPT

## 2018-02-07 PROCEDURE — 71045 X-RAY EXAM CHEST 1 VIEW: CPT

## 2018-02-07 PROCEDURE — 84484 ASSAY OF TROPONIN QUANT: CPT

## 2018-02-07 PROCEDURE — 71250 CT THORAX DX C-: CPT | Mod: 26

## 2018-02-07 PROCEDURE — 87633 RESP VIRUS 12-25 TARGETS: CPT

## 2018-02-07 PROCEDURE — 83735 ASSAY OF MAGNESIUM: CPT

## 2018-02-07 PROCEDURE — 82550 ASSAY OF CK (CPK): CPT

## 2018-02-07 PROCEDURE — 71250 CT THORAX DX C-: CPT

## 2018-02-07 PROCEDURE — 80048 BASIC METABOLIC PNL TOTAL CA: CPT

## 2018-02-07 PROCEDURE — 80053 COMPREHEN METABOLIC PANEL: CPT

## 2018-02-07 RX ORDER — SODIUM CHLORIDE 9 MG/ML
1000 INJECTION INTRAMUSCULAR; INTRAVENOUS; SUBCUTANEOUS
Qty: 0 | Refills: 0 | Status: DISCONTINUED | OUTPATIENT
Start: 2018-02-07 | End: 2018-02-07

## 2018-02-07 RX ORDER — WARFARIN SODIUM 2.5 MG/1
4 TABLET ORAL ONCE
Qty: 0 | Refills: 0 | Status: DISCONTINUED | OUTPATIENT
Start: 2018-02-07 | End: 2018-02-07

## 2018-02-07 RX ORDER — ENOXAPARIN SODIUM 100 MG/ML
70 INJECTION SUBCUTANEOUS ONCE
Qty: 0 | Refills: 0 | Status: COMPLETED | OUTPATIENT
Start: 2018-02-07 | End: 2018-02-07

## 2018-02-07 RX ORDER — PANTOPRAZOLE SODIUM 20 MG/1
1 TABLET, DELAYED RELEASE ORAL
Qty: 0 | Refills: 0 | DISCHARGE
Start: 2018-02-07

## 2018-02-07 RX ORDER — WARFARIN SODIUM 2.5 MG/1
1 TABLET ORAL
Qty: 0 | Refills: 0 | COMMUNITY

## 2018-02-07 RX ADMIN — Medication 100 MILLIGRAM(S): at 17:57

## 2018-02-07 RX ADMIN — WARFARIN SODIUM 4 MILLIGRAM(S): 2.5 TABLET ORAL at 00:28

## 2018-02-07 RX ADMIN — Medication 100 MILLIGRAM(S): at 12:23

## 2018-02-07 RX ADMIN — ATORVASTATIN CALCIUM 20 MILLIGRAM(S): 80 TABLET, FILM COATED ORAL at 00:27

## 2018-02-07 RX ADMIN — PANTOPRAZOLE SODIUM 40 MILLIGRAM(S): 20 TABLET, DELAYED RELEASE ORAL at 06:17

## 2018-02-07 RX ADMIN — Medication 30 MILLIGRAM(S): at 17:57

## 2018-02-07 RX ADMIN — Medication 81 MILLIGRAM(S): at 12:24

## 2018-02-07 RX ADMIN — GABAPENTIN 300 MILLIGRAM(S): 400 CAPSULE ORAL at 00:27

## 2018-02-07 RX ADMIN — Medication 30 MILLIGRAM(S): at 06:17

## 2018-02-07 RX ADMIN — Medication 100 MILLIGRAM(S): at 06:17

## 2018-02-07 NOTE — DISCHARGE NOTE ADULT - MEDICATION SUMMARY - MEDICATIONS TO CHANGE
I will SWITCH the dose or number of times a day I take the medications listed below when I get home from the hospital:    hydrochlorothiazide 25 mg oral tablet  -- 1 tab(s) by mouth once a day

## 2018-02-07 NOTE — DISCHARGE NOTE ADULT - CARE PLAN
Principal Discharge DX:	Influenza  Goal:	resolution of infection  Assessment and plan of treatment:	Rest, stay well hydrated. Continue all your medications as prescribed. Continue Tamiflu 75mg BID for 4 more days.  Follow up with primary care doctor for reevaluation within one week.  Secondary Diagnosis:	History of DVT (deep vein thrombosis)  Assessment and plan of treatment:	Please follow up with your doctor to clarify need for use of Coumadin.  Secondary Diagnosis:	HTN (hypertension)  Assessment and plan of treatment:	Continue Hydrochlorotiazide as ordered by your doctor. Monitor blood pressure daily. Hold your blood pressure medication for blood pressure less than 100/60   Follow up with your doctor for further management Principal Discharge DX:	Influenza  Goal:	resolution of infection  Assessment and plan of treatment:	Rest, stay well hydrated. Continue all your medications as prescribed. Continue Tamiflu 75mg BID for 4 more days.  Follow up with primary care doctor for reevaluation within one week.  Secondary Diagnosis:	History of DVT (deep vein thrombosis)  Assessment and plan of treatment:	Please follow up with your doctor to clarify need for use of Coumadin within 1 week.  Secondary Diagnosis:	HTN (hypertension)  Assessment and plan of treatment:	Continue Hydrochlorotiazide as ordered by your doctor. Monitor blood pressure daily. Hold your blood pressure medication for blood pressure less than 100/60   Follow up with your doctor for further management

## 2018-02-07 NOTE — DISCHARGE NOTE ADULT - MEDICATION SUMMARY - MEDICATIONS TO TAKE
I will START or STAY ON the medications listed below when I get home from the hospital:    aspirin 81 mg oral delayed release tablet  -- 1 tab(s) by mouth once a day  -- Indication: For HLD (hyperlipidemia)    Coumadin 4 mg oral tablet  -- 1 tab(s) by mouth every other day  -- Indication: For History of DVT (deep vein thrombosis)    gabapentin 300 mg oral capsule  -- 1 cap(s) by mouth once a day  -- Indication: For Neuropathy    Janumet XR 50 mg-500 mg oral tablet, extended release  -- 1 tab(s) by mouth once a day (in the evening)  -- Indication: For Diabetes    Lipitor 20 mg oral tablet  -- 1 tab(s) by mouth once a day  -- Indication: For HLD (hyperlipidemia)    oseltamivir 30 mg oral capsule  -- 1 cap(s) by mouth 2 times a day  -- Indication: For Influenza    hydrochlorothiazide 25 mg oral tablet  -- 0.5 tab(s) by mouth once a day  -- Indication: For HTN (hypertension)    pantoprazole 40 mg oral delayed release tablet  -- 1 tab(s) by mouth once a day (before a meal)  -- Indication: For stomach    cyanocobalamin 1000 mcg oral tablet  -- 1 tab(s) by mouth once a day  -- Indication: For vitamin

## 2018-02-07 NOTE — DISCHARGE NOTE ADULT - MEDICATION SUMMARY - MEDICATIONS TO STOP TAKING
I will STOP taking the medications listed below when I get home from the hospital:    metoprolol 25 mg oral tablet, extended release  -- 1 tab(s) by mouth once a day    Coumadin 3 mg oral tablet  -- 1 tab(s) by mouth every other day    Lovenox 80 mg/0.8 mL injectable solution  -- 0.7 milliliter(s) injectable 2 times a day  -- It is very important that you take or use this exactly as directed.  Do not skip doses or discontinue unless directed by your doctor.

## 2018-02-07 NOTE — DISCHARGE NOTE ADULT - PLAN OF CARE
resolution of infection Rest, stay well hydrated. Continue all your medications as prescribed. Continue Tamiflu 75mg BID for 4 more days.  Follow up with primary care doctor for reevaluation within one week. Please follow up with your doctor to clarify need for use of Coumadin. Continue Hydrochlorotiazide as ordered by your doctor. Monitor blood pressure daily. Hold your blood pressure medication for blood pressure less than 100/60   Follow up with your doctor for further management Please follow up with your doctor to clarify need for use of Coumadin within 1 week.

## 2018-02-07 NOTE — DISCHARGE NOTE ADULT - HOSPITAL COURSE
78 yo Female from home, lives alone with PMH of DVT right leg, currently on Coumadin, HTN, DMT2, RA, diverticulosis s/p colectomy, HLD, GERD came to ED for symptoms of subjective fever, congestion and dry cough for 3 days. Patient  denies sick contacts, recent travel, chest pain, shortness of breath, abdominal pain, vomiting or diarrhea   In ED RVP positive for Influenza AH3. No leucocytosis   CXR: Small left basilar opacities, may represent atelectasis.  CT scan of chest: ....  Tamiflu 75mg BID initiated   Droplet Isolation maintained     History of provoked DVT and PE.  Patient has been advised to be on lifetime anticoagulation by PMD   Patient takes Coumadin 4 mg daily   INR 1.23   Unable to reach PCP Román Miller (out of town) to clarify if patient needs to continue anticoagulation     HTN (hypertension).  Pt on Hctz and Metoprolol at home dose.   Metoprolol discontinued, HR in 50s     Patient verbalizes feeling better, no fever, chest pain or SOB. Afebrile   Cleared to be discharged home with recommendation to follow up with PCP for reevaluation and further management 76 yo Female from home, lives alone with PMH of DVT right leg, currently on Coumadin, HTN, DMT2, RA, diverticulosis s/p colectomy, HLD, GERD came to ED for symptoms of subjective fever, congestion and dry cough for 3 days. Patient  denies sick contacts, recent travel, chest pain, shortness of breath, abdominal pain, vomiting or diarrhea   In ED RVP positive for Influenza AH3. No leucocytosis   CXR: Small left basilar opacities, may represent atelectasis.  CT scan of chest:   Minimal atelectasis in the lingula. Otherwise, no parenchymal   consolidations.  Tamiflu 75mg BID initiated   Droplet Isolation maintained     History of provoked DVT and PE.  Patient has been advised to be on lifetime anticoagulation by PMD   Patient takes Coumadin 4 mg daily   INR 1.23   Unable to reach PCP Román Miller (out of town) to clarify if patient needs to continue anticoagulation     HTN (hypertension).  Pt on Hctz and Metoprolol at home dose.   Metoprolol discontinued, HR in 50s     Patient verbalizes feeling better, no fever, chest pain or SOB. Afebrile   Cleared to be discharged home with recommendation to follow up with PCP for reevaluation and further management

## 2018-02-07 NOTE — DISCHARGE NOTE ADULT - CARE PROVIDER_API CALL
Kajal Blanco), Medicine  [address] [place] [address]  Phone: [phone]  Fax: [phone]

## 2018-02-07 NOTE — DISCHARGE NOTE ADULT - PATIENT PORTAL LINK FT
You can access the [facility] Portal, offered by [facility], by registering with the following website: [email or web address]

## 2018-02-11 LAB
CULTURE RESULTS: SIGNIFICANT CHANGE UP
CULTURE RESULTS: SIGNIFICANT CHANGE UP
SPECIMEN SOURCE: SIGNIFICANT CHANGE UP
SPECIMEN SOURCE: SIGNIFICANT CHANGE UP

## 2018-05-08 NOTE — ED ADULT NURSE NOTE - OBJECTIVE STATEMENT
Problem: Potential for Falls  Goal: Patient will remain free of falls  INTERVENTIONS:  - Assess patient frequently for physical needs  -  Identify cognitive and physical deficits and behaviors that affect risk of falls    -  [place] fall precautions as indicated by assessment   - Educate patient/family on patient safety including physical limitations  - Instruct patient to call for assistance with activity based on assessment  - Modify environment to reduce risk of injury  - Consider OT/PT consult to assist with strengthening/mobility   Outcome: Progressing
Pt c/o of chest pain when coughing started yesterday, rates pain 10/10. States it started yesterday. At the moment, pt denies SOB. Nursing monitoring continues.

## 2019-06-06 NOTE — ED ADULT NURSE REASSESSMENT NOTE - NS ED NURSE REASSESS COMMENT FT1
June 6, 2019      Екатерина Ramon  [address]  [place] [address]        To Whom It May Concern:    Екатерина Ramon  was seen on 6/5/19.  Please excuse her on 6/7/19 due to illness.        Sincerely,        ESTUARDO Jain CNP     pt resting comfortably in bed absent any distress or C/O pain. safety measures in place, able to make needs known with care provided PRN

## 2020-03-04 ENCOUNTER — APPOINTMENT (OUTPATIENT)
Dept: NEUROLOGY | Facility: CLINIC | Age: 80
End: 2020-03-04
Payer: MEDICARE

## 2020-03-04 VITALS
BODY MASS INDEX: 23.82 KG/M2 | TEMPERATURE: 98.1 F | WEIGHT: 143 LBS | DIASTOLIC BLOOD PRESSURE: 70 MMHG | HEIGHT: 65 IN | OXYGEN SATURATION: 96 % | SYSTOLIC BLOOD PRESSURE: 155 MMHG | HEART RATE: 66 BPM

## 2020-03-04 DIAGNOSIS — Z86.79 PERSONAL HISTORY OF OTHER DISEASES OF THE CIRCULATORY SYSTEM: ICD-10-CM

## 2020-03-04 DIAGNOSIS — Z83.3 FAMILY HISTORY OF DIABETES MELLITUS: ICD-10-CM

## 2020-03-04 DIAGNOSIS — Z82.49 FAMILY HISTORY OF ISCHEMIC HEART DISEASE AND OTHER DISEASES OF THE CIRCULATORY SYSTEM: ICD-10-CM

## 2020-03-04 DIAGNOSIS — Z86.2 PERSONAL HISTORY OF DISEASES OF THE BLOOD AND BLOOD-FORMING ORGANS AND CERTAIN DISORDERS INVOLVING THE IMMUNE MECHANISM: ICD-10-CM

## 2020-03-04 DIAGNOSIS — I77.6 ARTERITIS, UNSPECIFIED: ICD-10-CM

## 2020-03-04 DIAGNOSIS — Z86.39 PERSONAL HISTORY OF OTHER ENDOCRINE, NUTRITIONAL AND METABOLIC DISEASE: ICD-10-CM

## 2020-03-04 PROCEDURE — 99203 OFFICE O/P NEW LOW 30 MIN: CPT

## 2020-03-04 NOTE — HISTORY OF PRESENT ILLNESS
[FreeTextEntry1] : 80 Y F with PMH of DM , HTN , hearing loss, DVT ( on warfarin for >20 yrs), Arthritis, pulmonary TB ( childhood), insomnia complaining of memory issues and lack of attention. Per daughter patient had impaired attention for at least 2 years. Patient has insight over her memory issues. Per daughter patient refuses using hearing aid and that aggravate her current status. Patient has been a former smoker for 10 pack yea, denies having ETOH or history of head trauma. \par

## 2020-03-04 NOTE — PHYSICAL EXAM
[General Appearance - Alert] : alert [Impaired Insight] : insight and judgment were intact [Affect] : the affect was normal [Mood] : the mood was normal [Memory Remote] : remote memory was not impaired [Person] : oriented to person [Place] : oriented to place [Registration Intact] : recent registration memory intact [Concentration Intact] : normal concentrating ability [Visual Intact] : visual attention was ~T not ~L decreased [Naming Objects] : no difficulty naming common objects [Repeating Phrases] : no difficulty repeating a phrase [Writing A Sentence] : no difficulty writing a sentence [Fluency] : fluency intact [Comprehension] : comprehension intact [Vocabulary] : adequate range of vocabulary [___ / 30] : the patient achieved a total score of [unfilled] /30 [___ / 5] : Visuospatial / Executive: [unfilled] / 5 [0 / 0] : Memory: 0 / 0 [___ / 3] : Attention (Serial 7 subtraction): [unfilled] / 3 [___ / 1] : Fluency: [unfilled] / 1 [___ / 2] : Abstraction: [unfilled] / 2 [___ / 5] : Delayed Recall: [unfilled] / 5 [___ / 6] : Orientation: [unfilled] / 6 [Cranial Nerves Optic (II)] : visual acuity intact bilaterally,  visual fields full to confrontation, pupils equal round and reactive to light [Cranial Nerves Oculomotor (III)] : extraocular motion intact [Cranial Nerves Trigeminal (V)] : facial sensation intact symmetrically [Cranial Nerves Facial (VII)] : face symmetrical [Cranial Nerves Vestibulocochlear (VIII)] : hearing was intact bilaterally [Cranial Nerves Glossopharyngeal (IX)] : tongue and palate midline [Cranial Nerves Accessory (XI - Cranial And Spinal)] : head turning and shoulder shrug symmetric [Cranial Nerves Hypoglossal (XII)] : there was no tongue deviation with protrusion [Motor Strength] : muscle strength was normal in all four extremities [Sensation Tactile Decrease] : light touch was intact [Sensation Pain / Temperature Decrease] : pain and temperature was intact [Sensation Vibration Decrease] : vibration was intact [Proprioception] : proprioception was intact [Balance] : balance was intact [2+] : Patella left 2+ [1+] : Ankle jerk left 1+ [Sclera] : the sclera and conjunctiva were normal [PERRL With Normal Accommodation] : pupils were equal in size, round, reactive to light, with normal accommodation [Extraocular Movements] : extraocular movements were intact [Outer Ear] : the ears and nose were normal in appearance [Oropharynx] : the oropharynx was normal [Neck Appearance] : the appearance of the neck was normal [Neck Cervical Mass (___cm)] : no neck mass was observed [Jugular Venous Distention Increased] : there was no jugular-venous distention [Thyroid Diffuse Enlargement] : the thyroid was not enlarged [Thyroid Nodule] : there were no palpable thyroid nodules [Auscultation Breath Sounds / Voice Sounds] : lungs were clear to auscultation bilaterally [Heart Rate And Rhythm] : heart rate was normal and rhythm regular [Heart Sounds] : normal S1 and S2 [Heart Sounds Gallop] : no gallops [Murmurs] : no murmurs [Heart Sounds Pericardial Friction Rub] : no pericardial rub [Full Pulse] : the pedal pulses are present [Edema] : there was no peripheral edema [Bowel Sounds] : normal bowel sounds [Abdomen Soft] : soft [Abdomen Tenderness] : non-tender [] : no hepato-splenomegaly [Abdomen Mass (___ Cm)] : no abdominal mass palpated [No CVA Tenderness] : no ~M costovertebral angle tenderness [No Spinal Tenderness] : no spinal tenderness [Abnormal Walk] : normal gait [Nail Clubbing] : no clubbing  or cyanosis of the fingernails [Musculoskeletal - Swelling] : no joint swelling seen [Motor Tone] : muscle strength and tone were normal [Time] : disoriented to time [Short Term Intact] : short term memory impaired [Remote Intact] : remote memory impaired [Span Intact] : the attention span was decreased [Current Events] : inadequate knowledge of current events [Paresis Pronator Drift Right-Sided] : no pronator drift on the right [Paresis Pronator Drift Left-Sided] : no pronator drift on the left [Motor Strength Upper Extremities Bilaterally] : strength was normal in both upper extremities [Motor Strength Lower Extremities Bilaterally] : strength was normal in both lower extremities [Romberg's Sign] : Romberg's sign was negtive [Dysesthesia] : no dysesthesia [Hyperesthesia] : no hyperesthesia [Past-pointing] : there was no past-pointing [Dysdiadochokinesia Bilaterally] : not present [Coordination - Dysmetria Impaired Finger-to-Nose Bilateral] : not present [Coordination - Dysmetria Impaired Heel-to-Shin Bilateral] : not present [Plantar Reflex Right Only] : normal on the right [Plantar Reflex Left Only] : normal on the left [Primitive Reflexes] : primitive reflexes were absent

## 2020-03-04 NOTE — DISCUSSION/SUMMARY
[FreeTextEntry1] : Distinguish vascular dementia from AD by MRI head if possible. Investigate potentially treatable causes of dementia.

## 2020-03-04 NOTE — REVIEW OF SYSTEMS
[Confused or Disoriented] : confusion [Memory Lapses or Loss] : memory loss [Decr. Concentrating Ability] : decreased concentrating ability [Repeating Questions] : repeated questioning about recent events [Sleep Disturbances] : sleep disturbances [Anxiety] : anxiety [Depression] : depression [Emotional Problems] : emotional problems [Arthralgias] : arthralgias [Difficulty with Language] : no ~M difficulty with language [Changed Thought Patterns] : no change in thought patterns [Facial Weakness] : no facial weakness [Arm Weakness] : no arm weakness [Hand Weakness] : no hand weakness [Leg Weakness] : no leg weakness [Poor Coordination] : good coordination [Difficulty Writing] : no difficulty writing [Difficulties in Speech] : no speech difficulties [Numbness] : no numbness [Tingling] : no tingling [Abnormal Sensation] : no abnormal sensation [Hypersensitivity] : no hypersensitivity [Seizures] : no convulsions [Dizziness] : no dizziness [Fainting] : no fainting [Lightheadedness] : no lightheadedness [Vertigo] : no vertigo [Migraine Headache] : no migraine headache [Difficulty Walking] : no difficulty walking [Inability to Walk] : able to walk [Ataxia] : no ataxia [Frequent Falls] : not falling [Limping] : not limping [Suicidal] : not suicidal [Change In Personality] : no personality change [Eye Pain] : no eye pain [Red Eyes] : eyes not red [Eyesight Problems] : no eyesight problems [Discharge From Eyes] : no purulent discharge from the eyes [Dry Eyes] : no dryness of the eyes [Eyes Itch] : no itching of the eyes [Earache] : no earache [Loss Of Hearing] : no hearing loss [Nosebleeds] : no nosebleeds [Nasal Discharge] : no nasal discharge [Sore Throat] : no sore throat [Heart Rate Is Slow] : the heart rate was not slow [Heart Rate Is Fast] : the heart rate was not fast [Chest Pain] : no chest pain [Palpitations] : no palpitations [Leg Claudication] : no intermittent leg claudication [Lower Ext Edema] : no lower extremity edema [Shortness Of Breath] : no shortness of breath [Wheezing] : no wheezing [Cough] : no cough [SOB on Exertion] : no shortness of breath during exertion [Orthopnea] : no orthopnea [PND] : no PND [Abdominal Pain] : no abdominal pain [Vomiting] : no vomiting [Constipation] : no constipation [Diarrhea] : no diarrhea [Heartburn] : no heartburn [Melena] : no melena [Dysuria] : no dysuria [Incontinence] : no incontinence [Joint Pain] : no joint pain [Joint Swelling] : no joint swelling [Joint Stiffness] : no joint stiffness [Limb Pain] : no limb pain [Limb Swelling] : no limb swelling [Skin Lesions] : no skin lesions [Itching] : no itching [Breast Pain] : no breast pain [Proptosis] : no proptosis [Hot Flashes] : no hot flashes [Easy Bleeding] : no tendency for easy bleeding [Easy Bruising] : no tendency for easy bruising [Swollen Glands] : no swollen glands [Swollen Glands In The Neck] : no swollen glands in the neck

## 2020-04-10 ENCOUNTER — LABORATORY RESULT (OUTPATIENT)
Age: 80
End: 2020-04-10

## 2020-04-10 LAB
ERYTHROCYTE [SEDIMENTATION RATE] IN BLOOD BY WESTERGREN METHOD: 102 MM/HR
FOLATE SERPL-MCNC: 11.6 NG/ML
T PALLIDUM AB SER QL IA: NEGATIVE
VIT B12 SERPL-MCNC: 327 PG/ML

## 2020-04-11 ENCOUNTER — APPOINTMENT (OUTPATIENT)
Dept: MRI IMAGING | Facility: IMAGING CENTER | Age: 80
End: 2020-04-11
Payer: MEDICARE

## 2020-04-11 ENCOUNTER — OUTPATIENT (OUTPATIENT)
Dept: OUTPATIENT SERVICES | Facility: HOSPITAL | Age: 80
LOS: 1 days | End: 2020-04-11
Payer: COMMERCIAL

## 2020-04-11 DIAGNOSIS — F01.50 VASCULAR DEMENTIA WITHOUT BEHAVIORAL DISTURBANCE: ICD-10-CM

## 2020-04-11 PROCEDURE — 70551 MRI BRAIN STEM W/O DYE: CPT

## 2020-04-11 PROCEDURE — 70551 MRI BRAIN STEM W/O DYE: CPT | Mod: 26

## 2020-04-11 PROCEDURE — 70544 MR ANGIOGRAPHY HEAD W/O DYE: CPT

## 2020-04-11 PROCEDURE — 70544 MR ANGIOGRAPHY HEAD W/O DYE: CPT | Mod: 26,59

## 2020-04-28 ENCOUNTER — APPOINTMENT (OUTPATIENT)
Dept: NEUROLOGY | Facility: CLINIC | Age: 80
End: 2020-04-28
Payer: MEDICARE

## 2020-04-28 DIAGNOSIS — M19.90 UNSPECIFIED OSTEOARTHRITIS, UNSPECIFIED SITE: ICD-10-CM

## 2020-04-28 PROCEDURE — 99214 OFFICE O/P EST MOD 30 MIN: CPT | Mod: 95

## 2020-04-28 NOTE — PHYSICAL EXAM
[FreeTextEntry1] : Patient is well appearing\par Neurological Examination:\par Mental Status: Patient is awake and alert. Oriented to person, place, she does not remember the date or day of the week correctly . \par Language: No aphasia or paraphasic errors.\par Cranial Nerves:\par EOMI, No facial weakness, tongue protrudes in the midline, facial sensation intact\par Motor Exam: No pronator drift. Barrel roll intact. Fine motor movements intact in hands\par Able to stand up from chair without difficulty\par Sensory: (can test if there is a second examiner)\par Reflexes: Unable to examine\par Cerebellar: Finger to nose intact bilaterally, \par Gait: Unable to test

## 2020-04-28 NOTE — HISTORY OF PRESENT ILLNESS
[Patient] : the patient [FreeTextEntry4] : JENARO [FreeTextEntry1] : Following verbal informed consent that reassured safety, security , privacy of records and gave notice that no part of the video audio communication will be recorded, and that insurance-ruled copays or deductibles are likely to accrue, an audio/video link was established with an approved carrier. This is being done because a face-to-face encounter is inadvisable for non-urgent conditions during the Covid-19 pandemic emergency.\par Her daughters were present and she asked to talk to her daughters.\par She continues to have problems with memory. She tested positive for Covid but is not seriously ill. The Covid test was done around the same time that she had a blood test which showed a high ESR and CRP - consistent with covid infection. She had a telehealth session with her primary who sent her to a hematologist and a rheumatologist.

## 2020-04-28 NOTE — DISCUSSION/SUMMARY
[FreeTextEntry1] : Earlier the aim of requesting ESR and CRP  was to check for vasculitis as a cause of new onset dementia. In that context the raised ESR and CRP was of concern, but now appears to be related to the concurrent Covid infection. \par Requested a repeat ESR CRP after a month and a repeat Covid PCR to confirm she is free of virus after a month. \par Discussed exercise social interaction wearing the hearing aid.\par Discussed the MRI that demonstrates significant volume loss as consistent with Alzheimer's Disease

## 2020-05-19 ENCOUNTER — APPOINTMENT (OUTPATIENT)
Dept: NEUROLOGY | Facility: CLINIC | Age: 80
End: 2020-05-19

## 2020-05-28 LAB
CCP AB SER IA-ACNC: <8 UNITS
CRP SERPL-MCNC: 0.79 MG/DL
ERYTHROCYTE [SEDIMENTATION RATE] IN BLOOD BY WESTERGREN METHOD: 57 MM/HR
RF+CCP IGG SER-IMP: NEGATIVE
RHEUMATOID FACT SER QL: <10 IU/ML

## 2020-06-10 ENCOUNTER — APPOINTMENT (OUTPATIENT)
Dept: NEUROLOGY | Facility: CLINIC | Age: 80
End: 2020-06-10
Payer: MEDICARE

## 2020-06-10 VITALS
DIASTOLIC BLOOD PRESSURE: 76 MMHG | HEIGHT: 65 IN | HEART RATE: 58 BPM | BODY MASS INDEX: 23.66 KG/M2 | OXYGEN SATURATION: 98 % | WEIGHT: 142 LBS | TEMPERATURE: 98.4 F | SYSTOLIC BLOOD PRESSURE: 218 MMHG

## 2020-06-10 VITALS — SYSTOLIC BLOOD PRESSURE: 215 MMHG | DIASTOLIC BLOOD PRESSURE: 78 MMHG

## 2020-06-10 DIAGNOSIS — G93.2 BENIGN INTRACRANIAL HYPERTENSION: ICD-10-CM

## 2020-06-10 LAB
ANION GAP SERPL CALC-SCNC: 14 MMOL/L
BASOPHILS # BLD AUTO: 0.07 K/UL
BASOPHILS NFR BLD AUTO: 1.1 %
BUN SERPL-MCNC: 12 MG/DL
CALCIUM SERPL-MCNC: 9.4 MG/DL
CHLORIDE SERPL-SCNC: 102 MMOL/L
CO2 SERPL-SCNC: 27 MMOL/L
CREAT SERPL-MCNC: 0.95 MG/DL
CRP SERPL-MCNC: 0.9 MG/DL
EOSINOPHIL # BLD AUTO: 0.31 K/UL
EOSINOPHIL NFR BLD AUTO: 4.7 %
ERYTHROCYTE [SEDIMENTATION RATE] IN BLOOD BY WESTERGREN METHOD: 89 MM/HR
GLUCOSE SERPL-MCNC: 212 MG/DL
HCT VFR BLD CALC: 37.6 %
HGB BLD-MCNC: 11.7 G/DL
IMM GRANULOCYTES NFR BLD AUTO: 0.2 %
LYMPHOCYTES # BLD AUTO: 1.75 K/UL
LYMPHOCYTES NFR BLD AUTO: 26.8 %
MAN DIFF?: NORMAL
MCHC RBC-ENTMCNC: 29.7 PG
MCHC RBC-ENTMCNC: 31.1 GM/DL
MCV RBC AUTO: 95.4 FL
MONOCYTES # BLD AUTO: 0.36 K/UL
MONOCYTES NFR BLD AUTO: 5.5 %
NEUTROPHILS # BLD AUTO: 4.03 K/UL
NEUTROPHILS NFR BLD AUTO: 61.7 %
PLATELET # BLD AUTO: 201 K/UL
POTASSIUM SERPL-SCNC: 4.2 MMOL/L
RBC # BLD: 3.94 M/UL
RBC # FLD: 15.6 %
SODIUM SERPL-SCNC: 143 MMOL/L
WBC # FLD AUTO: 6.53 K/UL

## 2020-06-10 PROCEDURE — 99215 OFFICE O/P EST HI 40 MIN: CPT

## 2020-06-10 NOTE — DISCUSSION/SUMMARY
[FreeTextEntry1] : Repeat Covid PCR continue donezepil; start losaartan HCTZ for high BP (213/78) until she sees her PCP again who may want to adjust the medications.

## 2020-06-10 NOTE — HISTORY OF PRESENT ILLNESS
[FreeTextEntry1] : 80 Y F with PMH of DM , HTN , hearing loss, DVT ( on warfarin for >20 yrs now discontinue), Arthritis, pulmonary TB ( childhood), insomnia complaining of memory issues and lack of attention. According to her daughter she had impaired attention for at least 2 years. Patient has insight over her memory issues. Her daughter complains, she refuses using hearing aid and that aggravates her current status. A former smoker of 10 pack yea, denies having ETOH or history of head trauma. \par In April she tested positive for Covid by PCR but had minimal symptoms; that was when she tested her ESR and CRP that I had requested and they wer both high. After dropping by half in May the most recent ESR and C-RP is high again.\par

## 2020-06-10 NOTE — REVIEW OF SYSTEMS
[Confused or Disoriented] : confusion [Memory Lapses or Loss] : memory loss [Decr. Concentrating Ability] : decreased concentrating ability [Difficulty with Language] : no ~M difficulty with language [Changed Thought Patterns] : no change in thought patterns [Repeating Questions] : repeated questioning about recent events [Facial Weakness] : no facial weakness [Arm Weakness] : no arm weakness [Hand Weakness] : no hand weakness [Leg Weakness] : no leg weakness [Poor Coordination] : good coordination [Difficulty Writing] : no difficulty writing [Difficulties in Speech] : no speech difficulties [Numbness] : no numbness [Tingling] : no tingling [Abnormal Sensation] : no abnormal sensation [Hypersensitivity] : no hypersensitivity [Seizures] : no convulsions [Dizziness] : no dizziness [Fainting] : no fainting [Lightheadedness] : no lightheadedness [Vertigo] : no vertigo [Migraine Headache] : no migraine headache [Difficulty Walking] : no difficulty walking [Inability to Walk] : able to walk [Ataxia] : no ataxia [Frequent Falls] : not falling [Limping] : not limping [Suicidal] : not suicidal [Sleep Disturbances] : sleep disturbances [Anxiety] : anxiety [Depression] : depression [Change In Personality] : no personality change [Emotional Problems] : emotional problems [Eye Pain] : no eye pain [Red Eyes] : eyes not red [Eyesight Problems] : no eyesight problems [Dry Eyes] : no dryness of the eyes [Discharge From Eyes] : no purulent discharge from the eyes [Eyes Itch] : no itching of the eyes [Earache] : no earache [Loss Of Hearing] : no hearing loss [Nosebleeds] : no nosebleeds [Nasal Discharge] : no nasal discharge [Sore Throat] : no sore throat [Heart Rate Is Slow] : the heart rate was not slow [Chest Pain] : no chest pain [Heart Rate Is Fast] : the heart rate was not fast [Palpitations] : no palpitations [Leg Claudication] : no intermittent leg claudication [Lower Ext Edema] : no lower extremity edema [Shortness Of Breath] : no shortness of breath [Wheezing] : no wheezing [Cough] : no cough [SOB on Exertion] : no shortness of breath during exertion [Orthopnea] : no orthopnea [PND] : no PND [Vomiting] : no vomiting [Abdominal Pain] : no abdominal pain [Diarrhea] : no diarrhea [Constipation] : no constipation [Heartburn] : no heartburn [Melena] : no melena [Dysuria] : no dysuria [Incontinence] : no incontinence [Arthralgias] : arthralgias [Joint Pain] : no joint pain [Joint Stiffness] : no joint stiffness [Joint Swelling] : no joint swelling [Limb Pain] : no limb pain [Limb Swelling] : no limb swelling [Skin Lesions] : no skin lesions [Itching] : no itching [Breast Pain] : no breast pain [Proptosis] : no proptosis [Hot Flashes] : no hot flashes [Easy Bleeding] : no tendency for easy bleeding [Easy Bruising] : no tendency for easy bruising [Swollen Glands] : no swollen glands [Swollen Glands In The Neck] : no swollen glands in the neck

## 2020-06-10 NOTE — PHYSICAL EXAM
[General Appearance - Alert] : alert [Impaired Insight] : insight and judgment were intact [Affect] : the affect was normal [Mood] : the mood was normal [Memory Remote] : remote memory was not impaired [Person] : oriented to person [Place] : oriented to place [Time] : disoriented to time [Remote Intact] : remote memory impaired stretcher [Short Term Intact] : short term memory impaired [Registration Intact] : recent registration memory intact [Concentration Intact] : normal concentrating ability [Span Intact] : the attention span was decreased [Visual Intact] : visual attention was ~T not ~L decreased [Naming Objects] : no difficulty naming common objects [Repeating Phrases] : no difficulty repeating a phrase [Writing A Sentence] : no difficulty writing a sentence [Fluency] : fluency intact [Current Events] : inadequate knowledge of current events [Comprehension] : comprehension intact [Vocabulary] : adequate range of vocabulary [___ / 30] : the patient achieved a total score of [unfilled] /30 [___ / 5] : Visuospatial / Executive: [unfilled] / 5 [0 / 0] : Memory: 0 / 0 [___ / 3] : Attention (Serial 7 subtraction): [unfilled] / 3 [___ / 1] : Fluency: [unfilled] / 1 [___ / 2] : Abstraction: [unfilled] / 2 [___ / 5] : Delayed Recall: [unfilled] / 5 [___ / 6] : Orientation: [unfilled] / 6 [Cranial Nerves Optic (II)] : visual acuity intact bilaterally,  visual fields full to confrontation, pupils equal round and reactive to light [Cranial Nerves Oculomotor (III)] : extraocular motion intact [Cranial Nerves Trigeminal (V)] : facial sensation intact symmetrically [Cranial Nerves Facial (VII)] : face symmetrical [Cranial Nerves Vestibulocochlear (VIII)] : hearing was intact bilaterally [Cranial Nerves Glossopharyngeal (IX)] : tongue and palate midline [Cranial Nerves Accessory (XI - Cranial And Spinal)] : head turning and shoulder shrug symmetric [Cranial Nerves Hypoglossal (XII)] : there was no tongue deviation with protrusion [Paresis Pronator Drift Right-Sided] : no pronator drift on the right [Motor Strength] : muscle strength was normal in all four extremities [Paresis Pronator Drift Left-Sided] : no pronator drift on the left [Motor Strength Upper Extremities Bilaterally] : strength was normal in both upper extremities [Motor Strength Lower Extremities Bilaterally] : strength was normal in both lower extremities [Sensation Tactile Decrease] : light touch was intact [Sensation Pain / Temperature Decrease] : pain and temperature was intact [Proprioception] : proprioception was intact [Sensation Vibration Decrease] : vibration was intact [Romberg's Sign] : Romberg's sign was negtive [Dysesthesia] : no dysesthesia [Hyperesthesia] : no hyperesthesia [Balance] : balance was intact [Past-pointing] : there was no past-pointing [Dysdiadochokinesia Bilaterally] : not present [Coordination - Dysmetria Impaired Finger-to-Nose Bilateral] : not present [Coordination - Dysmetria Impaired Heel-to-Shin Bilateral] : not present [2+] : Patella left 2+ [1+] : Ankle jerk left 1+ [Plantar Reflex Right Only] : normal on the right [Plantar Reflex Left Only] : normal on the left [Primitive Reflexes] : primitive reflexes were absent [Sclera] : the sclera and conjunctiva were normal [PERRL With Normal Accommodation] : pupils were equal in size, round, reactive to light, with normal accommodation [Extraocular Movements] : extraocular movements were intact [Outer Ear] : the ears and nose were normal in appearance [Oropharynx] : the oropharynx was normal [Neck Appearance] : the appearance of the neck was normal [Neck Cervical Mass (___cm)] : no neck mass was observed [Jugular Venous Distention Increased] : there was no jugular-venous distention [Thyroid Diffuse Enlargement] : the thyroid was not enlarged [Thyroid Nodule] : there were no palpable thyroid nodules [Auscultation Breath Sounds / Voice Sounds] : lungs were clear to auscultation bilaterally [Heart Rate And Rhythm] : heart rate was normal and rhythm regular [Heart Sounds] : normal S1 and S2 [Heart Sounds Gallop] : no gallops [Murmurs] : no murmurs [Heart Sounds Pericardial Friction Rub] : no pericardial rub [Full Pulse] : the pedal pulses are present [Edema] : there was no peripheral edema [Bowel Sounds] : normal bowel sounds [Abdomen Soft] : soft [Abdomen Tenderness] : non-tender [] : no hepato-splenomegaly [Abdomen Mass (___ Cm)] : no abdominal mass palpated [No CVA Tenderness] : no ~M costovertebral angle tenderness [No Spinal Tenderness] : no spinal tenderness [Nail Clubbing] : no clubbing  or cyanosis of the fingernails [Abnormal Walk] : normal gait [Musculoskeletal - Swelling] : no joint swelling seen [Motor Tone] : muscle strength and tone were normal

## 2020-06-11 ENCOUNTER — LABORATORY RESULT (OUTPATIENT)
Age: 80
End: 2020-06-11

## 2020-08-10 ENCOUNTER — APPOINTMENT (OUTPATIENT)
Dept: NEUROLOGY | Facility: CLINIC | Age: 80
End: 2020-08-10
Payer: MEDICARE

## 2020-08-10 VITALS
WEIGHT: 141 LBS | TEMPERATURE: 97.9 F | OXYGEN SATURATION: 98 % | DIASTOLIC BLOOD PRESSURE: 63 MMHG | SYSTOLIC BLOOD PRESSURE: 158 MMHG | HEIGHT: 65 IN | HEART RATE: 48 BPM | BODY MASS INDEX: 23.49 KG/M2

## 2020-08-10 PROCEDURE — 99214 OFFICE O/P EST MOD 30 MIN: CPT

## 2020-08-10 RX ORDER — ZONISAMIDE 50 MG/1
50 CAPSULE ORAL TWICE DAILY
Qty: 360 | Refills: 3 | Status: DISCONTINUED | COMMUNITY
Start: 2020-04-08 | End: 2020-08-10

## 2020-08-10 RX ORDER — WARFARIN 4 MG/1
TABLET ORAL
Refills: 0 | Status: DISCONTINUED | COMMUNITY
End: 2020-08-10

## 2020-08-10 NOTE — PHYSICAL EXAM
[General Appearance - Alert] : alert [Impaired Insight] : insight and judgment were intact [Affect] : the affect was normal [Mood] : the mood was normal [Memory Remote] : remote memory was not impaired [Person] : oriented to person [Place] : oriented to place [Registration Intact] : recent registration memory intact [Concentration Intact] : normal concentrating ability [Visual Intact] : visual attention was ~T not ~L decreased [Naming Objects] : no difficulty naming common objects [Writing A Sentence] : no difficulty writing a sentence [Repeating Phrases] : no difficulty repeating a phrase [Fluency] : fluency intact [Comprehension] : comprehension intact [Vocabulary] : adequate range of vocabulary [Cranial Nerves Optic (II)] : visual acuity intact bilaterally,  visual fields full to confrontation, pupils equal round and reactive to light [Cranial Nerves Oculomotor (III)] : extraocular motion intact [Cranial Nerves Trigeminal (V)] : facial sensation intact symmetrically [Cranial Nerves Facial (VII)] : face symmetrical [Cranial Nerves Vestibulocochlear (VIII)] : hearing was intact bilaterally [Cranial Nerves Glossopharyngeal (IX)] : tongue and palate midline [Cranial Nerves Accessory (XI - Cranial And Spinal)] : head turning and shoulder shrug symmetric [Cranial Nerves Hypoglossal (XII)] : there was no tongue deviation with protrusion [Motor Strength] : muscle strength was normal in all four extremities [Sensation Tactile Decrease] : light touch was intact [Sensation Pain / Temperature Decrease] : pain and temperature was intact [Sensation Vibration Decrease] : vibration was intact [Proprioception] : proprioception was intact [Balance] : balance was intact [2+] : Patella left 2+ [1+] : Ankle jerk left 1+ [Sclera] : the sclera and conjunctiva were normal [PERRL With Normal Accommodation] : pupils were equal in size, round, reactive to light, with normal accommodation [Extraocular Movements] : extraocular movements were intact [Outer Ear] : the ears and nose were normal in appearance [Oropharynx] : the oropharynx was normal [Neck Appearance] : the appearance of the neck was normal [Neck Cervical Mass (___cm)] : no neck mass was observed [Jugular Venous Distention Increased] : there was no jugular-venous distention [Thyroid Diffuse Enlargement] : the thyroid was not enlarged [Thyroid Nodule] : there were no palpable thyroid nodules [Auscultation Breath Sounds / Voice Sounds] : lungs were clear to auscultation bilaterally [Heart Rate And Rhythm] : heart rate was normal and rhythm regular [Heart Sounds] : normal S1 and S2 [Heart Sounds Gallop] : no gallops [Murmurs] : no murmurs [Heart Sounds Pericardial Friction Rub] : no pericardial rub [Full Pulse] : the pedal pulses are present [Edema] : there was no peripheral edema [Bowel Sounds] : normal bowel sounds [Abdomen Soft] : soft [Abdomen Tenderness] : non-tender [] : no hepato-splenomegaly [Abdomen Mass (___ Cm)] : no abdominal mass palpated [No CVA Tenderness] : no ~M costovertebral angle tenderness [No Spinal Tenderness] : no spinal tenderness [Abnormal Walk] : normal gait [Nail Clubbing] : no clubbing  or cyanosis of the fingernails [Musculoskeletal - Swelling] : no joint swelling seen [Motor Tone] : muscle strength and tone were normal [Remote Intact] : remote memory impaired [Time] : disoriented to time [Short Term Intact] : short term memory impaired [Span Intact] : the attention span was decreased [Paresis Pronator Drift Right-Sided] : no pronator drift on the right [Current Events] : inadequate knowledge of current events [Motor Strength Lower Extremities Bilaterally] : strength was normal in both lower extremities [Paresis Pronator Drift Left-Sided] : no pronator drift on the left [Motor Strength Upper Extremities Bilaterally] : strength was normal in both upper extremities [Romberg's Sign] : Romberg's sign was negtive [Dysesthesia] : no dysesthesia [Past-pointing] : there was no past-pointing [Hyperesthesia] : no hyperesthesia [Dysdiadochokinesia Bilaterally] : not present [Coordination - Dysmetria Impaired Finger-to-Nose Bilateral] : not present [Coordination - Dysmetria Impaired Heel-to-Shin Bilateral] : not present [Plantar Reflex Right Only] : normal on the right [Primitive Reflexes] : primitive reflexes were absent [Plantar Reflex Left Only] : normal on the left

## 2020-08-10 NOTE — DISCUSSION/SUMMARY
[FreeTextEntry1] : Repeat  ESR/ CRP. continue donezepil, losaartan HCTZ for high BP (213/78) Recommend walking two miles a day. Exhibited images of the MRI to show high T2 signals that represent areas of ischemia and shrinking (volume loss) of brain. 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 Follow up 6 months or earlier if needed. Requested results of paraneoplastic test

## 2020-08-10 NOTE — HISTORY OF PRESENT ILLNESS
[FreeTextEntry1] : 80 Y F with PMH of DM , HTN , hearing loss, DVT ( on warfarin for >20 yrs now discontinued), Arthritis, pulmonary TB ( childhood), insomnia complaining of memory issues and lack of attention. According to her daughter she had impaired attention for at least 2 years. Patient has insight over her memory issues. Her daughter complains, she refuses using hearing aid and that aggravates her current status. A former smoker of 10 pack yea, denies having ETOH or history of head trauma. \par In April she tested positive for Covid by PCR but had minimal symptoms; that was when she tested her ESR and CRP that I had requested and they were both high. After dropping by half in May the most recent ESR and C-RP was high again. She has been sent to rheumatology and oncology by her PCP. She reports her BP is better controlled by the medication I prescribed and headaches have stopped. She never had to take the zonisamide for headache.\par

## 2020-08-10 NOTE — REVIEW OF SYSTEMS
patient [Confused or Disoriented] : confusion [Memory Lapses or Loss] : memory loss [Decr. Concentrating Ability] : decreased concentrating ability [Repeating Questions] : repeated questioning about recent events [Sleep Disturbances] : sleep disturbances [Depression] : depression [Anxiety] : anxiety [Emotional Problems] : emotional problems [Arthralgias] : arthralgias [Difficulty with Language] : no ~M difficulty with language [Changed Thought Patterns] : no change in thought patterns [Arm Weakness] : no arm weakness [Facial Weakness] : no facial weakness [Leg Weakness] : no leg weakness [Hand Weakness] : no hand weakness [Poor Coordination] : good coordination [Difficulty Writing] : no difficulty writing [Difficulties in Speech] : no speech difficulties [Numbness] : no numbness [Tingling] : no tingling [Abnormal Sensation] : no abnormal sensation [Hypersensitivity] : no hypersensitivity [Seizures] : no convulsions [Dizziness] : no dizziness [Fainting] : no fainting [Lightheadedness] : no lightheadedness [Vertigo] : no vertigo [Migraine Headache] : no migraine headache [Difficulty Walking] : no difficulty walking [Inability to Walk] : able to walk [Ataxia] : no ataxia [Frequent Falls] : not falling [Limping] : not limping [Suicidal] : not suicidal [Change In Personality] : no personality change [Eye Pain] : no eye pain [Red Eyes] : eyes not red [Eyesight Problems] : no eyesight problems [Discharge From Eyes] : no purulent discharge from the eyes [Dry Eyes] : no dryness of the eyes [Eyes Itch] : no itching of the eyes [Earache] : no earache [Loss Of Hearing] : no hearing loss [Nosebleeds] : no nosebleeds [Nasal Discharge] : no nasal discharge [Sore Throat] : no sore throat [Heart Rate Is Slow] : the heart rate was not slow [Heart Rate Is Fast] : the heart rate was not fast [Chest Pain] : no chest pain [Palpitations] : no palpitations [Leg Claudication] : no intermittent leg claudication [Lower Ext Edema] : no lower extremity edema [Shortness Of Breath] : no shortness of breath [Wheezing] : no wheezing [Cough] : no cough [Orthopnea] : no orthopnea [SOB on Exertion] : no shortness of breath during exertion [PND] : no PND [Abdominal Pain] : no abdominal pain [Constipation] : no constipation [Vomiting] : no vomiting [Diarrhea] : no diarrhea [Melena] : no melena [Heartburn] : no heartburn [Dysuria] : no dysuria [Incontinence] : no incontinence [Joint Pain] : no joint pain [Joint Swelling] : no joint swelling [Joint Stiffness] : no joint stiffness [Limb Pain] : no limb pain [Limb Swelling] : no limb swelling [Skin Lesions] : no skin lesions [Itching] : no itching [Breast Pain] : no breast pain [Proptosis] : no proptosis [Hot Flashes] : no hot flashes [Easy Bleeding] : no tendency for easy bleeding [Swollen Glands] : no swollen glands [Easy Bruising] : no tendency for easy bruising [Swollen Glands In The Neck] : no swollen glands in the neck

## 2020-10-29 LAB
CRP SERPL-MCNC: 0.58 MG/DL
ERYTHROCYTE [SEDIMENTATION RATE] IN BLOOD BY WESTERGREN METHOD: 72 MM/HR

## 2020-11-02 ENCOUNTER — APPOINTMENT (OUTPATIENT)
Dept: NEUROLOGY | Facility: CLINIC | Age: 80
End: 2020-11-02
Payer: MEDICARE

## 2020-11-02 VITALS
OXYGEN SATURATION: 99 % | SYSTOLIC BLOOD PRESSURE: 190 MMHG | BODY MASS INDEX: 24.66 KG/M2 | HEIGHT: 65 IN | DIASTOLIC BLOOD PRESSURE: 74 MMHG | HEART RATE: 52 BPM | TEMPERATURE: 97.2 F | WEIGHT: 148 LBS

## 2020-11-02 DIAGNOSIS — M31.6 OTHER GIANT CELL ARTERITIS: ICD-10-CM

## 2020-11-02 PROCEDURE — 99215 OFFICE O/P EST HI 40 MIN: CPT

## 2020-11-02 NOTE — DISCUSSION/SUMMARY
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 Requested report of paraneoplastic antibodies x 2 which is not in the record\par Prednisone taper and maintain at 30 mg daily is prescribed for  presumed GCA

## 2020-11-02 NOTE — HISTORY OF PRESENT ILLNESS
[FreeTextEntry1] : She returns for follow up because her daughters were informed of the rising EST. She herself complains of an ache in the right back of the head.  One of her daughters complains she is discussing imaginary stories and exhibits persecutional delusions. Other times she inappropriately discussed stories of sex she imagines without her usual inhibition

## 2020-11-02 NOTE — PHYSICAL EXAM
[Registration Intact] : recent registration memory intact [Span Intact] : the attention span was normal [Concentration Intact] : normal concentrating ability [Visual Intact] : visual attention was ~T not ~L decreased [Naming Objects] : no difficulty naming common objects [Fluency] : fluency intact [Comprehension] : comprehension intact [Cranial Nerves Optic (II)] : visual acuity intact bilaterally,  visual fields full to confrontation, pupils equal round and reactive to light [Cranial Nerves Oculomotor (III)] : extraocular motion intact [Cranial Nerves Trigeminal (V)] : facial sensation intact symmetrically [Cranial Nerves Facial (VII)] : face symmetrical [Cranial Nerves Vestibulocochlear (VIII)] : hearing was intact bilaterally [Cranial Nerves Glossopharyngeal (IX)] : tongue and palate midline [Cranial Nerves Accessory (XI - Cranial And Spinal)] : head turning and shoulder shrug symmetric [Cranial Nerves Hypoglossal (XII)] : there was no tongue deviation with protrusion [Motor Tone] : muscle tone was normal in all four extremities [Motor Strength] : muscle strength was normal in all four extremities [Involuntary Movements] : no involuntary movements were seen [Sensation Tactile Decrease] : light touch was intact [Sensation Pain / Temperature Decrease] : pain and temperature was intact [Proprioception] : proprioception was intact [Limited Balance] : the patient's balance was impaired [Dysdiadochokinesia Bilaterally] : present bilaterally [2+] : Patella left 2+ [1+] : Ankle jerk left 1+ [Sclera] : the sclera and conjunctiva were normal [PERRL With Normal Accommodation] : pupils were equal in size, round, reactive to light, with normal accommodation [Extraocular Movements] : extraocular movements were intact [Person] : disoriented to person [Place] : disoriented to place [Time] : disoriented to time [Short Term Intact] : short term memory impaired [Remote Intact] : remote memory impaired [Repeating Phrases] : difficulty repeating a phrase [Writing A Sentence] : difficulty writing a sentence [Paresis Pronator Drift Right-Sided] : no pronator drift on the right [Paresis Pronator Drift Left-Sided] : no pronator drift on the left [Motor Strength Upper Extremities Bilaterally] : strength was normal in both upper extremities [Motor Strength Lower Extremities Bilaterally] : strength was normal in both lower extremities [Romberg's Sign] : Romberg's sign was negtive [Vibration Decrease Leg / Foot Both Ankles] : normal at both ankles [Vibration Decrease Leg / Foot Toes Both Feet] : normal at the toes of both feet  [Coordination - Dysmetria Impaired Finger-to-Nose Bilateral] : not present [Coordination - Dysmetria Impaired Heel-to-Shin Bilateral] : not present [Plantar Reflex Right Only] : normal on the right [Plantar Reflex Left Only] : normal on the left [Primitive Reflexes] : primitive reflexes were absent

## 2020-12-04 ENCOUNTER — APPOINTMENT (OUTPATIENT)
Dept: NEUROLOGY | Facility: CLINIC | Age: 80
End: 2020-12-04

## 2021-01-11 ENCOUNTER — APPOINTMENT (OUTPATIENT)
Dept: NEUROLOGY | Facility: CLINIC | Age: 81
End: 2021-01-11

## 2021-01-21 LAB
ALBUMIN SERPL ELPH-MCNC: 4.5 G/DL
ALP BLD-CCNC: 98 U/L
ALT SERPL-CCNC: 10 U/L
ANION GAP SERPL CALC-SCNC: 18 MMOL/L
AST SERPL-CCNC: 15 U/L
BASOPHILS # BLD AUTO: 0.08 K/UL
BASOPHILS NFR BLD AUTO: 1.2 %
BILIRUB SERPL-MCNC: 0.5 MG/DL
BUN SERPL-MCNC: 18 MG/DL
CALCIUM SERPL-MCNC: 9.3 MG/DL
CHLORIDE SERPL-SCNC: 99 MMOL/L
CO2 SERPL-SCNC: 24 MMOL/L
CREAT SERPL-MCNC: 1.16 MG/DL
CRP SERPL-MCNC: 0.5 MG/DL
EOSINOPHIL # BLD AUTO: 0.27 K/UL
EOSINOPHIL NFR BLD AUTO: 4 %
ERYTHROCYTE [SEDIMENTATION RATE] IN BLOOD BY WESTERGREN METHOD: 29 MM/HR
GLUCOSE SERPL-MCNC: 384 MG/DL
HCT VFR BLD CALC: 39.8 %
HGB BLD-MCNC: 11.9 G/DL
IMM GRANULOCYTES NFR BLD AUTO: 0.3 %
LYMPHOCYTES # BLD AUTO: 1.88 K/UL
LYMPHOCYTES NFR BLD AUTO: 27.6 %
MAN DIFF?: NORMAL
MCHC RBC-ENTMCNC: 29.9 GM/DL
MCHC RBC-ENTMCNC: 30.1 PG
MCV RBC AUTO: 100.5 FL
MONOCYTES # BLD AUTO: 0.38 K/UL
MONOCYTES NFR BLD AUTO: 5.6 %
NEUTROPHILS # BLD AUTO: 4.17 K/UL
NEUTROPHILS NFR BLD AUTO: 61.3 %
PLATELET # BLD AUTO: 224 K/UL
POTASSIUM SERPL-SCNC: 4.4 MMOL/L
PROT SERPL-MCNC: 7 G/DL
RBC # BLD: 3.96 M/UL
RBC # FLD: 14.6 %
SODIUM SERPL-SCNC: 142 MMOL/L
WBC # FLD AUTO: 6.8 K/UL

## 2021-02-08 ENCOUNTER — APPOINTMENT (OUTPATIENT)
Dept: CT IMAGING | Facility: HOSPITAL | Age: 81
End: 2021-02-08

## 2021-02-09 NOTE — ED ADULT NURSE NOTE - NS ED NURSE DC TEACHING
Patient given results of venous US.       Summary        Left CFV is patent with good compressibility and respirophasic signal with    good augmentation.    The Left GSV is non-compressible with no evidence of flow just past the    saphenofemoral junction to the knee. atypical chest pain/Other:

## 2021-04-06 NOTE — ED ADULT TRIAGE NOTE - BMI (KG/M2)
Refills declined   Schedule appt with Dr. Ubaldo Montoya now for refills. Not me for long-term care  Try [facility] and send a aletter.    Last seen me 5-2020 him    We cant keep refilling without her being seen  He has appts at  (goes 2 wed a month) she could do virutal or [facility]SC   Send to    30.2

## 2021-04-30 ENCOUNTER — APPOINTMENT (OUTPATIENT)
Dept: NEUROLOGY | Facility: CLINIC | Age: 81
End: 2021-04-30
Payer: MEDICARE

## 2021-04-30 VITALS
HEIGHT: 65 IN | SYSTOLIC BLOOD PRESSURE: 167 MMHG | TEMPERATURE: 97.2 F | DIASTOLIC BLOOD PRESSURE: 64 MMHG | HEART RATE: 50 BPM | BODY MASS INDEX: 24.66 KG/M2 | WEIGHT: 148 LBS | OXYGEN SATURATION: 98 %

## 2021-04-30 PROCEDURE — 99214 OFFICE O/P EST MOD 30 MIN: CPT

## 2021-04-30 NOTE — HISTORY OF PRESENT ILLNESS
[FreeTextEntry1] : Relieved of headache and pain in the lower back / abdomen. Sleeps well; no repetition of paranoia . Planning tript to visit daughter in [place]. Has had both doses of Covid vaccination. Had cataract surgery left eye

## 2021-04-30 NOTE — DATA REVIEWED
[de-identified] :  EXAM: MR ANGIO BRAIN   EXAM: MR BRAIN    PROCEDURE DATE: 04/11/2020     INTERPRETATION: Noncontrast MRI of the brain and MRA of the [place]   CLINICAL INDICATION: Vascular disease, dementia   TECHNIQUE: Sagittal T1 FLAIR, axial spoiled gradient, gradient echo, T2, and  T2 FLAIR, as well as diffusion-weighted MR images of the brain, and 3-D  time-of-flight images of the [place] were obtained without the  intravenous administration of contrast. An ADC map was generated. 3-D  time-of-flight images were reformatted using MIP protocol targeted over the  head.   COMPARISON: CT head dated 12/18/2016   FINDINGS:   Brain MRI:   No acute hemorrhage or infarct is identified. Age-appropriate involutional  changes and mild microvascular ischemic changes are present.    No hydrocephalus midline shift or extra-axial collections are identified.   Major flow voids at the skull base are within normal limits.   The orbits are not remarkable in appearance.   The visualized paranasal sinuses and tympanomastoid cavities are free of  acute disease.   Brain MRA:   There is mild focal narrowing of the cavernous segment of the left internal  carotid artery. The anterior and middle cerebral arteries are patent. Other  vertebral basilar confluence and basilar artery are normal. The posterior  cerebral arteries are well visualized. The anterior communicating artery is  not well visualized.   Impression:   Brain MRI: Age-appropriate involutional changes and mild microvascular  ischemic change.   Brain MRA: Mild focal narrowing of the cavernous segment of the left  internal carotid artery otherwise unremarkable brain MRA.          ESTHELA ROGERS M.D., ATTENDING RADIOLOGIST  This document has been electronically signed. Apr 11 2020 12:56PM

## 2021-04-30 NOTE — PHYSICAL EXAM
[Person] : oriented to person [Place] : oriented to place [Registration Intact] : recent registration memory intact [Span Intact] : the attention span was normal [Concentration Intact] : normal concentrating ability [Repeating Phrases] : no difficulty repeating a phrase [Fluency] : fluency intact [Comprehension] : comprehension intact [Cranial Nerves Optic (II)] : visual acuity intact bilaterally,  visual fields full to confrontation, pupils equal round and reactive to light [Cranial Nerves Oculomotor (III)] : extraocular motion intact [Cranial Nerves Trigeminal (V)] : facial sensation intact symmetrically [Cranial Nerves Facial (VII)] : face symmetrical [Cranial Nerves Vestibulocochlear (VIII)] : hearing was intact bilaterally [Cranial Nerves Glossopharyngeal (IX)] : tongue and palate midline [Cranial Nerves Accessory (XI - Cranial And Spinal)] : head turning and shoulder shrug symmetric [Motor Tone] : muscle tone was normal in all four extremities [Sensation Tactile Decrease] : light touch was intact [Motor Strength] : muscle strength was normal in all four extremities [Limited Balance] : the patient's balance was impaired [Coordination - Dysmetria Impaired Finger-to-Nose Bilateral] : present bilaterally [Coordination - Dysmetria Impaired Heel-to-Shin Bilateral] : present bilaterally [2+] : Patella left 2+ [1+] : Ankle jerk left 1+ [Sclera] : the sclera and conjunctiva were normal [PERRL With Normal Accommodation] : pupils were equal in size, round, reactive to light, with normal accommodation [Extraocular Movements] : extraocular movements were intact [Time] : disoriented to time [Short Term Intact] : short term memory impaired [Remote Intact] : remote memory impaired [Visual Intact] : visual attention was ~T ~L decreased [Naming Objects] : difficulty naming common objects [Past-pointing] : there was no past-pointing [Tremor] : no tremor present [Dysdiadochokinesia Bilaterally] : not present [Plantar Reflex Right Only] : normal on the right [Plantar Reflex Left Only] : normal on the left [Primitive Reflexes] : primitive reflexes were absent

## 2021-04-30 NOTE — DISCUSSION/SUMMARY
[FreeTextEntry1] : Reviewed ESR C-RP - improved in January; MOCA 15/30. Recommend add memantine - started 5 mg daily increasing gradually to 10 mg BID. Declined walker; check fasting lipid and HbA1C lipid profile repeatESR and CRP. Advised care against coronavirus infection even though vaccinated . Follow up 4-6 months

## 2021-04-30 NOTE — REVIEW OF SYSTEMS
[Confused or Disoriented] : confusion [Memory Lapses or Loss] : memory loss [Poor Coordination] : poor coordination [Sleep Disturbances] : sleep disturbances [Negative] : Gastrointestinal [Fever] : no fever [Chills] : no chills [Feeling Poorly] : not feeling poorly [Feeling Tired] : not feeling tired [Decr. Concentrating Ability] : no decrease in concentrating ability [Difficulty with Language] : no ~M difficulty with language [Changed Thought Patterns] : no change in thought patterns [Repeating Questions] : no repeated questioning about recent events [Facial Weakness] : no facial weakness [Arm Weakness] : no arm weakness [Hand Weakness] : no hand weakness [Leg Weakness] : no leg weakness [Difficulty Writing] : no difficulty writing [Numbness] : no numbness [Tingling] : no tingling [Abnormal Sensation] : no abnormal sensation [Hypersensitivity] : no hypersensitivity [Seizures] : no convulsions [Dizziness] : no dizziness [Fainting] : no fainting [Lightheadedness] : no lightheadedness [Vertigo] : no vertigo [Migraine Headache] : no migraine headache [Difficulty Walking] : no difficulty walking [Inability to Walk] : able to walk [Ataxia] : no ataxia [Frequent Falls] : not falling [Limping] : not limping [Anxiety] : no anxiety [Depression] : no depression [Eye Pain] : no eye pain [Red Eyes] : eyes not red [Eyesight Problems] : no eyesight problems [Discharge From Eyes] : no purulent discharge from the eyes [Dry Eyes] : no dryness of the eyes [Eyes Itch] : no itching of the eyes [Earache] : no earache [Loss Of Hearing] : no hearing loss [Nosebleeds] : no nosebleeds [Nasal Discharge] : no nasal discharge

## 2021-09-07 ENCOUNTER — APPOINTMENT (OUTPATIENT)
Dept: NEUROLOGY | Facility: CLINIC | Age: 81
End: 2021-09-07
Payer: MEDICARE

## 2021-09-08 ENCOUNTER — INPATIENT (INPATIENT)
Facility: HOSPITAL | Age: 81
LOS: 8 days | Discharge: HOME HEALTH | DRG: 417 | End: 2021-09-17
Attending: SURGERY | Admitting: SURGERY
Payer: COMMERCIAL

## 2021-09-08 VITALS
OXYGEN SATURATION: 99 % | WEIGHT: 141.98 LBS | HEART RATE: 64 BPM | DIASTOLIC BLOOD PRESSURE: 56 MMHG | HEIGHT: 64 IN | RESPIRATION RATE: 20 BRPM | SYSTOLIC BLOOD PRESSURE: 113 MMHG | TEMPERATURE: 99 F

## 2021-09-08 DIAGNOSIS — F03.90 UNSPECIFIED DEMENTIA WITHOUT BEHAVIORAL DISTURBANCE: ICD-10-CM

## 2021-09-08 DIAGNOSIS — K85.90 ACUTE PANCREATITIS WITHOUT NECROSIS OR INFECTION, UNSPECIFIED: ICD-10-CM

## 2021-09-08 DIAGNOSIS — N17.9 ACUTE KIDNEY FAILURE, UNSPECIFIED: ICD-10-CM

## 2021-09-08 DIAGNOSIS — R17 UNSPECIFIED JAUNDICE: ICD-10-CM

## 2021-09-08 DIAGNOSIS — Z29.9 ENCOUNTER FOR PROPHYLACTIC MEASURES, UNSPECIFIED: ICD-10-CM

## 2021-09-08 DIAGNOSIS — D64.9 ANEMIA, UNSPECIFIED: ICD-10-CM

## 2021-09-08 DIAGNOSIS — R74.01 ELEVATION OF LEVELS OF LIVER TRANSAMINASE LEVELS: ICD-10-CM

## 2021-09-08 DIAGNOSIS — E11.9 TYPE 2 DIABETES MELLITUS WITHOUT COMPLICATIONS: ICD-10-CM

## 2021-09-08 DIAGNOSIS — I10 ESSENTIAL (PRIMARY) HYPERTENSION: ICD-10-CM

## 2021-09-08 LAB
ALBUMIN SERPL ELPH-MCNC: 2.6 G/DL — LOW (ref 3.5–5)
ALP SERPL-CCNC: 640 U/L — HIGH (ref 40–120)
ALT FLD-CCNC: 249 U/L DA — HIGH (ref 10–60)
ANION GAP SERPL CALC-SCNC: 10 MMOL/L — SIGNIFICANT CHANGE UP (ref 5–17)
APPEARANCE UR: CLEAR — SIGNIFICANT CHANGE UP
APTT BLD: 31.5 SEC — SIGNIFICANT CHANGE UP (ref 27.5–35.5)
AST SERPL-CCNC: 250 U/L — HIGH (ref 10–40)
BASOPHILS # BLD AUTO: 0.06 K/UL — SIGNIFICANT CHANGE UP (ref 0–0.2)
BASOPHILS NFR BLD AUTO: 0.4 % — SIGNIFICANT CHANGE UP (ref 0–2)
BILIRUB SERPL-MCNC: 10.4 MG/DL — HIGH (ref 0.2–1.2)
BILIRUB UR-MCNC: ABNORMAL
BLD GP AB SCN SERPL QL: SIGNIFICANT CHANGE UP
BUN SERPL-MCNC: 19 MG/DL — HIGH (ref 7–18)
CALCIUM SERPL-MCNC: 8.6 MG/DL — SIGNIFICANT CHANGE UP (ref 8.4–10.5)
CHLORIDE SERPL-SCNC: 93 MMOL/L — LOW (ref 96–108)
CO2 SERPL-SCNC: 28 MMOL/L — SIGNIFICANT CHANGE UP (ref 22–31)
COLOR SPEC: ABNORMAL
CREAT SERPL-MCNC: 1.56 MG/DL — HIGH (ref 0.5–1.3)
DIFF PNL FLD: ABNORMAL
EOSINOPHIL # BLD AUTO: 0.05 K/UL — SIGNIFICANT CHANGE UP (ref 0–0.5)
EOSINOPHIL NFR BLD AUTO: 0.3 % — SIGNIFICANT CHANGE UP (ref 0–6)
GLUCOSE SERPL-MCNC: 411 MG/DL — HIGH (ref 70–99)
GLUCOSE UR QL: 1000 MG/DL
HAV IGM SER-ACNC: SIGNIFICANT CHANGE UP
HBV CORE IGM SER-ACNC: SIGNIFICANT CHANGE UP
HBV SURFACE AG SER-ACNC: SIGNIFICANT CHANGE UP
HCT VFR BLD CALC: 29.1 % — LOW (ref 34.5–45)
HCV AB S/CO SERPL IA: 0.21 S/CO — SIGNIFICANT CHANGE UP (ref 0–0.99)
HCV AB SERPL-IMP: SIGNIFICANT CHANGE UP
HGB BLD-MCNC: 10.3 G/DL — LOW (ref 11.5–15.5)
IMM GRANULOCYTES NFR BLD AUTO: 1 % — SIGNIFICANT CHANGE UP (ref 0–1.5)
INR BLD: 1.15 RATIO — SIGNIFICANT CHANGE UP (ref 0.88–1.16)
KETONES UR-MCNC: NEGATIVE — SIGNIFICANT CHANGE UP
LEUKOCYTE ESTERASE UR-ACNC: ABNORMAL
LIDOCAIN IGE QN: HIGH U/L (ref 73–393)
LYMPHOCYTES # BLD AUTO: 0.84 K/UL — LOW (ref 1–3.3)
LYMPHOCYTES # BLD AUTO: 5.5 % — LOW (ref 13–44)
MCHC RBC-ENTMCNC: 29.7 PG — SIGNIFICANT CHANGE UP (ref 27–34)
MCHC RBC-ENTMCNC: 35.4 GM/DL — SIGNIFICANT CHANGE UP (ref 32–36)
MCV RBC AUTO: 83.9 FL — SIGNIFICANT CHANGE UP (ref 80–100)
MONOCYTES # BLD AUTO: 0.58 K/UL — SIGNIFICANT CHANGE UP (ref 0–0.9)
MONOCYTES NFR BLD AUTO: 3.8 % — SIGNIFICANT CHANGE UP (ref 2–14)
NEUTROPHILS # BLD AUTO: 13.6 K/UL — HIGH (ref 1.8–7.4)
NEUTROPHILS NFR BLD AUTO: 89 % — HIGH (ref 43–77)
NITRITE UR-MCNC: NEGATIVE — SIGNIFICANT CHANGE UP
NRBC # BLD: 0 /100 WBCS — SIGNIFICANT CHANGE UP (ref 0–0)
PH UR: 6.5 — SIGNIFICANT CHANGE UP (ref 5–8)
PLATELET # BLD AUTO: 288 K/UL — SIGNIFICANT CHANGE UP (ref 150–400)
POTASSIUM SERPL-MCNC: 3.7 MMOL/L — SIGNIFICANT CHANGE UP (ref 3.5–5.3)
POTASSIUM SERPL-SCNC: 3.7 MMOL/L — SIGNIFICANT CHANGE UP (ref 3.5–5.3)
PROT SERPL-MCNC: 7.3 G/DL — SIGNIFICANT CHANGE UP (ref 6–8.3)
PROT UR-MCNC: 30 MG/DL
PROTHROM AB SERPL-ACNC: 13.6 SEC — SIGNIFICANT CHANGE UP (ref 10.6–13.6)
RBC # BLD: 3.47 M/UL — LOW (ref 3.8–5.2)
RBC # FLD: 16.6 % — HIGH (ref 10.3–14.5)
SARS-COV-2 RNA SPEC QL NAA+PROBE: SIGNIFICANT CHANGE UP
SODIUM SERPL-SCNC: 131 MMOL/L — LOW (ref 135–145)
SP GR SPEC: 1.01 — SIGNIFICANT CHANGE UP (ref 1.01–1.02)
TROPONIN I SERPL-MCNC: <0.015 NG/ML — SIGNIFICANT CHANGE UP (ref 0–0.04)
UROBILINOGEN FLD QL: 8
WBC # BLD: 15.29 K/UL — HIGH (ref 3.8–10.5)
WBC # FLD AUTO: 15.29 K/UL — HIGH (ref 3.8–10.5)

## 2021-09-08 PROCEDURE — 99221 1ST HOSP IP/OBS SF/LOW 40: CPT | Mod: GC

## 2021-09-08 PROCEDURE — 74177 CT ABD & PELVIS W/CONTRAST: CPT | Mod: 26,MG

## 2021-09-08 PROCEDURE — 99222 1ST HOSP IP/OBS MODERATE 55: CPT

## 2021-09-08 PROCEDURE — G1004: CPT

## 2021-09-08 PROCEDURE — 99285 EMERGENCY DEPT VISIT HI MDM: CPT

## 2021-09-08 RX ORDER — QUETIAPINE FUMARATE 200 MG/1
25 TABLET, FILM COATED ORAL ONCE
Refills: 0 | Status: COMPLETED | OUTPATIENT
Start: 2021-09-08 | End: 2021-09-08

## 2021-09-08 RX ORDER — DONEPEZIL HYDROCHLORIDE 10 MG/1
10 TABLET, FILM COATED ORAL AT BEDTIME
Refills: 0 | Status: DISCONTINUED | OUTPATIENT
Start: 2021-09-08 | End: 2021-09-15

## 2021-09-08 RX ORDER — SITAGLIPTIN AND METFORMIN HYDROCHLORIDE 500; 50 MG/1; MG/1
1 TABLET, FILM COATED ORAL
Qty: 0 | Refills: 0 | DISCHARGE

## 2021-09-08 RX ORDER — INSULIN LISPRO 100/ML
VIAL (ML) SUBCUTANEOUS
Refills: 0 | Status: DISCONTINUED | OUTPATIENT
Start: 2021-09-08 | End: 2021-09-09

## 2021-09-08 RX ORDER — GABAPENTIN 400 MG/1
1 CAPSULE ORAL
Qty: 0 | Refills: 0 | DISCHARGE

## 2021-09-08 RX ORDER — SODIUM CHLORIDE 9 MG/ML
1000 INJECTION INTRAMUSCULAR; INTRAVENOUS; SUBCUTANEOUS ONCE
Refills: 0 | Status: COMPLETED | OUTPATIENT
Start: 2021-09-08 | End: 2021-09-08

## 2021-09-08 RX ORDER — PIPERACILLIN AND TAZOBACTAM 4; .5 G/20ML; G/20ML
3.38 INJECTION, POWDER, LYOPHILIZED, FOR SOLUTION INTRAVENOUS EVERY 8 HOURS
Refills: 0 | Status: DISCONTINUED | OUTPATIENT
Start: 2021-09-08 | End: 2021-09-11

## 2021-09-08 RX ORDER — ASPIRIN/CALCIUM CARB/MAGNESIUM 324 MG
1 TABLET ORAL
Qty: 0 | Refills: 0 | DISCHARGE

## 2021-09-08 RX ORDER — TRAMADOL HYDROCHLORIDE 50 MG/1
25 TABLET ORAL EVERY 8 HOURS
Refills: 0 | Status: DISCONTINUED | OUTPATIENT
Start: 2021-09-08 | End: 2021-09-15

## 2021-09-08 RX ORDER — ATORVASTATIN CALCIUM 80 MG/1
1 TABLET, FILM COATED ORAL
Qty: 0 | Refills: 0 | DISCHARGE

## 2021-09-08 RX ORDER — ACETAMINOPHEN 500 MG
325 TABLET ORAL EVERY 12 HOURS
Refills: 0 | Status: DISCONTINUED | OUTPATIENT
Start: 2021-09-08 | End: 2021-09-15

## 2021-09-08 RX ORDER — FAMOTIDINE 10 MG/ML
20 INJECTION INTRAVENOUS ONCE
Refills: 0 | Status: COMPLETED | OUTPATIENT
Start: 2021-09-08 | End: 2021-09-08

## 2021-09-08 RX ORDER — ONDANSETRON 8 MG/1
4 TABLET, FILM COATED ORAL ONCE
Refills: 0 | Status: COMPLETED | OUTPATIENT
Start: 2021-09-08 | End: 2021-09-08

## 2021-09-08 RX ORDER — SODIUM CHLORIDE 9 MG/ML
1000 INJECTION, SOLUTION INTRAVENOUS
Refills: 0 | Status: DISCONTINUED | OUTPATIENT
Start: 2021-09-08 | End: 2021-09-09

## 2021-09-08 RX ORDER — PIPERACILLIN AND TAZOBACTAM 4; .5 G/20ML; G/20ML
3.38 INJECTION, POWDER, LYOPHILIZED, FOR SOLUTION INTRAVENOUS ONCE
Refills: 0 | Status: COMPLETED | OUTPATIENT
Start: 2021-09-08 | End: 2021-09-08

## 2021-09-08 RX ORDER — MORPHINE SULFATE 50 MG/1
2 CAPSULE, EXTENDED RELEASE ORAL ONCE
Refills: 0 | Status: DISCONTINUED | OUTPATIENT
Start: 2021-09-08 | End: 2021-09-08

## 2021-09-08 RX ADMIN — FAMOTIDINE 20 MILLIGRAM(S): 10 INJECTION INTRAVENOUS at 11:19

## 2021-09-08 RX ADMIN — MORPHINE SULFATE 2 MILLIGRAM(S): 50 CAPSULE, EXTENDED RELEASE ORAL at 11:19

## 2021-09-08 RX ADMIN — PIPERACILLIN AND TAZOBACTAM 200 GRAM(S): 4; .5 INJECTION, POWDER, LYOPHILIZED, FOR SOLUTION INTRAVENOUS at 19:47

## 2021-09-08 RX ADMIN — MORPHINE SULFATE 2 MILLIGRAM(S): 50 CAPSULE, EXTENDED RELEASE ORAL at 12:34

## 2021-09-08 RX ADMIN — ONDANSETRON 4 MILLIGRAM(S): 8 TABLET, FILM COATED ORAL at 11:19

## 2021-09-08 RX ADMIN — SODIUM CHLORIDE 1000 MILLILITER(S): 9 INJECTION INTRAMUSCULAR; INTRAVENOUS; SUBCUTANEOUS at 12:35

## 2021-09-08 RX ADMIN — QUETIAPINE FUMARATE 25 MILLIGRAM(S): 200 TABLET, FILM COATED ORAL at 19:02

## 2021-09-08 NOTE — H&P ADULT - PROBLEM SELECTOR PLAN 4
on home losartaqn hctz nifedipine  hold all htn meds for now  monitor bp  resume as needed Hb 10.3 unknown baseline  f/u anemia panel

## 2021-09-08 NOTE — H&P ADULT - PROBLEM SELECTOR PLAN 7
hold dvt prophylaxis for now as pt is for procedure  resume after procedure if ok with GI - lovenox 40 sq qd Pt w/ SCr 1.5 on admission  Baseline SCr unknown  F/U Urine Lytes, calculate FeNa  IVF for now, follow BMP daily

## 2021-09-08 NOTE — CONSULT NOTE ADULT - SUBJECTIVE AND OBJECTIVE BOX
[facility] GI CONSULTATION    Patient is a 81y old  Female who presents with a chief complaint of abdominal pain and nausea x 1 week  HPI:    PMH/PSH:  PAST MEDICAL & SURGICAL HISTORY:  DM (diabetes mellitus)    HTN (hypertension)    Reflux    HLD (hyperlipidemia)    Neuropathy of lower extremity    RA (rheumatoid arthritis)    Deep vein thrombosis (DVT), right    Knee pain, chronic, right    S/P eye surgery, follow-up exam  right eye    S/P colon resection    S/P appendectomy    Total knee replacement status  left    S/P partial hysterectomy      FH:  FAMILY HISTORY:  Family history of diabetes mellitus (Sibling)  Type 2        MEDS:  MEDICATIONS  (STANDING):    MEDICATIONS  (PRN):    Allergies    No Known Allergies    Intolerances            CONSTITUTIONAL:  No weight loss, fever, chills, weakness or fatigue.  HEENT:  Eyes:  No visual loss, blurred vision, double vision or yellow sclerae. Ears, Nose, Throat:  No hearing loss, sneezing, congestion, runny nose or sore throat.  SKIN:  No rash or itching.  CARDIOVASCULAR:  No chest pain, chest pressure or chest discomfort. No palpitations or edema.  RESPIRATORY:  No shortness of breath, cough or sputum.  GASTROINTESTINAL:  SEE HPI  GENITOURINARY:  No dysuria, hematuria, urinary frequency  NEUROLOGICAL:  No headache, dizziness, syncope, paralysis, ataxia, numbness or tingling in the extremities. No change in bowel or bladder control.  MUSCULOSKELETAL:  No muscle, back pain, joint pain or stiffness.  HEMATOLOGIC:  No anemia, bleeding or bruising.  LYMPHATICS:  No enlarged nodes. No history of splenectomy.  PSYCHIATRIC:  No history of depression or anxiety.  ENDOCRINOLOGIC:  No reports of sweating, cold or heat intolerance. No polyuria or polydipsia.    GI HPI: Patient seen and examined lying on stretcher. Denies abd pain, nausea or vomiting. No diarrhea, no constipation, no melena or BRBPR. As per daughter patient with c/o worsening abdominal pain over past week with decrease appetite.   ______________________________________________________________________  PHYSICAL EXAM:  T(C): 37.1 (09-08-21 @ 10:21), Max: 37.1 (09-08-21 @ 10:21)  HR: 64 (09-08-21 @ 10:21)  BP: 113/56 (09-08-21 @ 10:21)  RR: 20 (09-08-21 @ 10:21)  SpO2: 93% (09-08-21 @ 10:21)  Wt(kg): --      GEN: NAD,   HEENT: icteric sclera  CVS: S1S2+  CHEST: clear to auscultation  ABD: soft , mildly tender on exam, nondistended, bowel sounds present  EXTR: no cyanosis, no clubbing, no edema  NEURO: Awake and alert; oriented to person, place, time   SKIN:  warm;  + icteric    ______________________________________________________________________  LABS:                        10.3   15.29 )-----------( 288      ( 08 Sep 2021 11:16 )             29.1     09-08    131<L>  |  93<L>  |  19<H>  ----------------------------<  411<H>  3.7   |  28  |  1.56<H>    Ca    8.6      08 Sep 2021 11:16    TPro  7.3  /  Alb  2.6<L>  /  TBili  10.4<H>  /  DBili  x   /  AST  250<H>  /  ALT  249<H>  /  AlkPhos  640<H>  09-08    LIVER FUNCTIONS - ( 08 Sep 2021 11:16 )  Alb: 2.6 g/dL / Pro: 7.3 g/dL / ALK PHOS: 640 U/L / ALT: 249 U/L DA / AST: 250 U/L / GGT: x                     [facility] GI CONSULTATION    Patient is a 81y old  Female who presents with a chief complaint of abdominal pain and nausea x 1 week  HPI:    PMH/PSH:  PAST MEDICAL & SURGICAL HISTORY:  DM (diabetes mellitus)    HTN (hypertension)    Reflux    HLD (hyperlipidemia)    Neuropathy of lower extremity    RA (rheumatoid arthritis)    Deep vein thrombosis (DVT), right    Knee pain, chronic, right    S/P eye surgery, follow-up exam  right eye    S/P colon resection - 2/2 diverticulitis    S/P appendectomy    Total knee replacement status  left    S/P partial hysterectomy      FH:  FAMILY HISTORY:  Family history of diabetes mellitus (Sibling)  Type 2        MEDS:  MEDICATIONS  (STANDING):    MEDICATIONS  (PRN):    Allergies    No Known Allergies    Intolerances            CONSTITUTIONAL:  No weight loss, fever, chills, weakness or fatigue.  HEENT:  Eyes:  No visual loss, blurred vision, double vision or yellow sclerae. Ears, Nose, Throat:  No hearing loss, sneezing, congestion, runny nose or sore throat.  SKIN:  No rash or itching.  CARDIOVASCULAR:  No chest pain, chest pressure or chest discomfort. No palpitations or edema.  RESPIRATORY:  No shortness of breath, cough or sputum.  GASTROINTESTINAL:  SEE HPI  GENITOURINARY:  No dysuria, hematuria, urinary frequency  NEUROLOGICAL:  No headache, dizziness, syncope, paralysis, ataxia, numbness or tingling in the extremities. No change in bowel or bladder control.  MUSCULOSKELETAL:  No muscle, back pain, joint pain or stiffness.  HEMATOLOGIC:  No anemia, bleeding or bruising.  LYMPHATICS:  No enlarged nodes. No history of splenectomy.  PSYCHIATRIC:  No history of depression or anxiety.  ENDOCRINOLOGIC:  No reports of sweating, cold or heat intolerance. No polyuria or polydipsia.    GI HPI: Patient seen and examined lying on stretcher. Denies abd pain, nausea or vomiting. No diarrhea, no constipation, no melena or BRBPR. As per daughter patient with c/o worsening abdominal pain over past week with decrease appetite.   ______________________________________________________________________  PHYSICAL EXAM:  T(C): 37.1 (09-08-21 @ 10:21), Max: 37.1 (09-08-21 @ 10:21)  HR: 64 (09-08-21 @ 10:21)  BP: 113/56 (09-08-21 @ 10:21)  RR: 20 (09-08-21 @ 10:21)  SpO2: 93% (09-08-21 @ 10:21)  Wt(kg): --      GEN: NAD,   HEENT: icteric sclera  CVS: S1S2+  CHEST: clear to auscultation  ABD: soft , mildly tender on exam, nondistended, bowel sounds present  EXTR: no cyanosis, no clubbing, no edema  NEURO: Awake and alert; oriented to person, place, time   SKIN:  warm;  + icteric    ______________________________________________________________________  LABS:                        10.3   15.29 )-----------( 288      ( 08 Sep 2021 11:16 )             29.1     09-08    131<L>  |  93<L>  |  19<H>  ----------------------------<  411<H>  3.7   |  28  |  1.56<H>    Ca    8.6      08 Sep 2021 11:16    TPro  7.3  /  Alb  2.6<L>  /  TBili  10.4<H>  /  DBili  x   /  AST  250<H>  /  ALT  249<H>  /  AlkPhos  640<H>  09-08    LIVER FUNCTIONS - ( 08 Sep 2021 11:16 )  Alb: 2.6 g/dL / Pro: 7.3 g/dL / ALK PHOS: 640 U/L / ALT: 249 U/L DA / AST: 250 U/L / GGT: x                     [facility] GI CONSULTATION    Patient is a 81y old  Female who presents with a chief complaint of abdominal pain and nausea x 1 week  HPI:    PMH/PSH:  PAST MEDICAL & SURGICAL HISTORY:  DM (diabetes mellitus)    HTN (hypertension)    Reflux    HLD (hyperlipidemia)    Neuropathy of lower extremity    RA (rheumatoid arthritis)    Deep vein thrombosis (DVT), right    Knee pain, chronic, right    S/P eye surgery, follow-up exam  right eye    S/P colon resection - 2/2 diverticulitis    S/P appendectomy    Total knee replacement status  left    S/P partial hysterectomy      FH:  FAMILY HISTORY:  Family history of diabetes mellitus (Sibling)  Type 2        MEDS:  MEDICATIONS  (STANDING):    MEDICATIONS  (PRN):    Allergies    No Known Allergies    Intolerances          CONSTITUTIONAL:  No weight loss, fever, chills, weakness or fatigue.  HEENT:  Eyes:  No visual loss, blurred vision, double vision or yellow sclerae. Ears, Nose, Throat:  No hearing loss, sneezing, congestion, runny nose or sore throat.  SKIN:  No rash or itching.  CARDIOVASCULAR:  No chest pain, chest pressure or chest discomfort. No palpitations or edema.  RESPIRATORY:  No shortness of breath, cough or sputum.  GASTROINTESTINAL:  SEE HPI  GENITOURINARY:  No dysuria, hematuria, urinary frequency  NEUROLOGICAL:  No headache, dizziness, syncope, paralysis, ataxia, numbness or tingling in the extremities. No change in bowel or bladder control.  MUSCULOSKELETAL:  No muscle, back pain, joint pain or stiffness.  HEMATOLOGIC:  No anemia, bleeding or bruising.  LYMPHATICS:  No enlarged nodes. No history of splenectomy.  PSYCHIATRIC:  No history of depression or anxiety.  ENDOCRINOLOGIC:  No reports of sweating, cold or heat intolerance. No polyuria or polydipsia.    GI HPI: Patient seen and examined lying on stretcher. Denies abd pain, nausea or vomiting. No diarrhea, no constipation, no melena or BRBPR. As per daughter patient with c/o worsening abdominal pain over past week with decrease appetite.   ______________________________________________________________________  PHYSICAL EXAM:  T(C): 37.1 (09-08-21 @ 10:21), Max: 37.1 (09-08-21 @ 10:21)  HR: 64 (09-08-21 @ 10:21)  BP: 113/56 (09-08-21 @ 10:21)  RR: 20 (09-08-21 @ 10:21)  SpO2: 93% (09-08-21 @ 10:21)  Wt(kg): --      GEN: NAD,   HEENT: icteric sclera  CVS: S1S2+  CHEST: clear to auscultation  ABD: soft , mildly tender on exam, nondistended, bowel sounds present  EXTR: no cyanosis, no clubbing, no edema  NEURO: Awake and alert; oriented to person, place, time   SKIN:  warm;  + icteric    ______________________________________________________________________  LABS:                        10.3   15.29 )-----------( 288      ( 08 Sep 2021 11:16 )             29.1     09-08    131<L>  |  93<L>  |  19<H>  ----------------------------<  411<H>  3.7   |  28  |  1.56<H>    Ca    8.6      08 Sep 2021 11:16    TPro  7.3  /  Alb  2.6<L>  /  TBili  10.4<H>  /  DBili  x   /  AST  250<H>  /  ALT  249<H>  /  AlkPhos  640<H>  09-08    LIVER FUNCTIONS - ( 08 Sep 2021 11:16 )  Alb: 2.6 g/dL / Pro: 7.3 g/dL / ALK PHOS: 640 U/L / ALT: 249 U/L DA / AST: 250 U/L / GGT: x

## 2021-09-08 NOTE — H&P ADULT - HISTORY OF PRESENT ILLNESS
81F, ambulates independently, lives alone at home (on 2nd floor, daughter Jodee  lives on 1st floor, daughter Delfina  lives 2 houses down) with PMH HTN, DM, PSH Diverticulitis needing exlap, p/w progressive jaundice x 1 week. As per daughter Delfina at bedside, pt was noted to be jaundiced about 2 days prior to admission. She was overtly yellow on the day of admission which prompted ED visit. Pt has been complaining about intermittent abdominal pain x  81F, ambulates independently, lives alone at home with [facility] [id number] (on 2nd floor, daughter Jodee  lives on 1st floor, daughter Delfina  lives 2 houses down) with PMH HTN, DM, PSH Diverticulitis needing exlap, p/w progressive jaundice x 1 week. As per daughter Delfina at bedside, pt was noted to be jaundiced about 2 days prior to admission. She was overtly yellow on the day of admission which prompted ED visit. Pt has been complaining about intermittent epigastric associated meals. Pain currently is similar now. Patient has been having pain x 1 week and has not been eating to avoid experiencing pain. Pt with chills the night prior to admission, but temperature was not taken. Pt has lost 8lb in the last 2 weeks due to abdominal pain and food aversion.    Pt is recently diagnosed with dementia x1yr since getting COVID in 2020. As per family, patient has outbursts of anger and has poor short term memory during the interview. 81F, ambulates independently, lives alone at home with [facility] [id number] (on 2nd floor, daughter Jodee  lives on 1st floor, daughter Delfina  lives 2 houses down) with PMH HTN, DM, PSH Diverticulitis needing exlap, p/w progressive jaundice x 1 week. As per daughter Delfina at bedside, pt was noted to be jaundiced about 2 days prior to admission. She was overtly yellow on the day of admission which prompted ED visit. Pt has been complaining about intermittent epigastric associated meals. Pain currently is similar now. Patient has been having pain x 1 week and has not been eating to avoid experiencing pain. Pt with chills the night prior to admission, but temperature was not taken. Pt has lost 8lb in the last 2 weeks due to abdominal pain and food aversion. Pt with pruritus 2 days prior to admission and increasing sugars 270s vs 140s 1 week prior.    Pt is recently diagnosed with dementia x1yr since getting COVID in 2020. As per family, patient has outbursts of anger and has poor short term memory during the interview.

## 2021-09-08 NOTE — ED PROVIDER NOTE - OBJECTIVE STATEMENT
81 y.o female presents to the ED complaining of abdominal pain and nausea for 3 days. Patient has a history of dementia. Daughter is bedside, giving collateral info. Per daughter, patient has history of diabetes, HLD, acid reflux, and DVT. Daughter also noticed that mother's skin and eyes have become more yellow. Patient's last bowel movement was yesterday, which was normal. Patient denies of urinary symptoms, or any other complaints. NKDA.

## 2021-09-08 NOTE — H&P ADULT - NSHPPHYSICALEXAM_GEN_ALL_CORE
General - NAD, sitting up in bed, well groomed, JAUNDICE  Eyes - PERRLA, EOM intacc  ENT - Icteric sclerae, PERRLA, EOMI. Oropharynx clear. Moist mucous membranes. Conjunctivae appear well perfused.   Neck - No noticeable or palpable swelling, redness or rash around throat or on face  Lymph Nodes - No lymphadenopathy  Cardiovascular - RRR no m/r/g, no JVD, no carotid bruits  Lungs - Clear to ascultation, no use of acessory muscles, no crackles or wheezes.  Skin - No rashes, skin warm and dry, no erythematous areas  Abdomen - Normal bowel sounds, abdomen soft, (+) Anguiano's sign, tender RUQ w/ halting on inspiration, (+) mild tenderness to palpation on LUQ  GenitoUrinary – Genital exam not performed since complaints not related.  Rectal – Rectal exam not performed since no symptoms indicated blood loss.  Extremities - No edema, cyanosis or clubbing  MusculoSkeletal - 5/5 strength, normal range of motion, no swollen or erythematous joints.  Neurological – Alert and oriented x 2, CN 2-12 grossly intact.

## 2021-09-08 NOTE — H&P ADULT - PROBLEM SELECTOR PLAN 2
Lipase 12K + abdominal pain  gallstone pancreatitis  IVF LR 150cc/hr for now  No need to trend lipase  Tramadol 25 q8 + tylenol 325 q12 prn for pain control for now Lipase 12K + abdominal pain  gallstone pancreatitis  IVF LR 150cc/hr for now  No need to trend lipase  Tramadol 25 q8 + tylenol 325 q12 prn for pain control for now  s/p 1 L bolus in ED

## 2021-09-08 NOTE — H&P ADULT - PROBLEM SELECTOR PLAN 6
recent dementia on donepezil  given 1 dose seroquel 25 mg qd   needs frequent reorientation on janumet and glimeperide  FS ACHS  maintain fs 140-180  sliding scale  f/u a1c

## 2021-09-08 NOTE — H&P ADULT - NSICDXPASTMEDICALHX_GEN_ALL_CORE_FT
PAST MEDICAL HISTORY:  Deep vein thrombosis (DVT), right     DM (diabetes mellitus)     HLD (hyperlipidemia)     HTN (hypertension)     Knee pain, chronic, right     Neuropathy of lower extremity     RA (rheumatoid arthritis)     Reflux

## 2021-09-08 NOTE — ED PROVIDER NOTE - CLINICAL SUMMARY MEDICAL DECISION MAKING FREE TEXT BOX
Jaundice with scleral icterus. Confirm for abdominal mass vs. Liver GI pathology. Get labs, CT, supportive care, and reassess.

## 2021-09-08 NOTE — H&P ADULT - NSICDXPASTSURGICALHX_GEN_ALL_CORE_FT
PAST SURGICAL HISTORY:  S/P appendectomy     S/P colon resection     S/P eye surgery, follow-up exam right eye    S/P partial hysterectomy     Total knee replacement status left

## 2021-09-08 NOTE — ED ADULT NURSE NOTE - NSIMPLEMENTINTERV_GEN_ALL_ED
Implemented All Universal Safety Interventions:  [place] to call system. Call bell, personal items and telephone within reach. Instruct patient to call for assistance. Room bathroom lighting operational. Non-slip footwear when patient is off stretcher. Physically safe environment: no spills, clutter or unnecessary equipment. Stretcher in lowest position, wheels locked, appropriate side rails in place.

## 2021-09-08 NOTE — ED PROVIDER NOTE - NSICDXFAMILYHX_GEN_ALL_CORE_FT
FAMILY HISTORY:  Sibling  Still living? No  Family history of diabetes mellitus, Age at diagnosis: Age Unknown

## 2021-09-08 NOTE — CONSULT NOTE ADULT - PROBLEM SELECTOR RECOMMENDATION 9
secondary to blockage of bile and pancreatic ducts  monitor LFT's  plan for EUS/ERCP tomorrow  clear liquid diet  NPO after MN  IV hydration

## 2021-09-08 NOTE — ED PROVIDER NOTE - PROGRESS NOTE DETAILS
[facility]: ct shows dilated biliary ducts and possibly mass vs stone at ampulla. Dr Clemens, GI- will be npo and perform eus if inr normal. pt not on coumadin anymore. [facility]: ct shows dilated biliary ducts and possibly mass vs stone at ampulla. Dr Clemens, GI- will be npo and perform eus if inr normal. pt not on coumadin anymore..

## 2021-09-08 NOTE — CONSULT NOTE ADULT - PROBLEM SELECTOR RECOMMENDATION 2
IV hydration with LR 150cc/hr  main management  clears as tolerated  npo after mn IV hydration with LR 150cc/hr  pain management  clears as tolerated  npo after mn

## 2021-09-08 NOTE — CONSULT NOTE ADULT - ASSESSMENT
81 year old female with a h/o DM, HTN, HLD, and reflux presents with worsening abdominal pain over past week. In ED patient found to have transaminitis with elevated Lipase and CT A 81 year old female with a h/o DM, HTN, HLD, and reflux presents with worsening abdominal pain over past week. In ED patient found to have transaminitis with elevated Lipase and CT A/P showing dilated CBD and pancreatic ducts. Choledocholithiasis vs tumor 81 year old female with a h/o DM, HTN, HLD, and reflux presents with worsening abdominal pain and jaundice over the past week. In ED patient found to have transaminitis with elevated Lipase and CT A/P showing dilated CBD and pancreatic ducts. Choledocholithiasis vs tumor

## 2021-09-08 NOTE — H&P ADULT - PROBLEM SELECTOR PLAN 1
Bilirubin 10.4, INR 1.15  Liver function with obstructive pattern - likely cholelithiasis, cholecystitis, gallstone pancreatitis  f/u bilirubin direct indirect  CT AP Shows: Dilated intra and extrahepatic biliary ducts. The common bile duct measures up to 1.5 cm in diameter which is dilated. The main pancreatic duct is mildly dilated as well. Apparent 1.4 cm stone or mass in the region of the ampulla.   Dr. Jayleen FOOTE consulted - plan for EUS/ERCP in AM  NPO After midnight, clears for now Bilirubin 10.4, INR 1.15  Liver function with obstructive pattern - likely cholelithiasis, cholecystitis, gallstone pancreatitis  f/u bilirubin direct indirect  CT AP Shows: Dilated intra and extrahepatic biliary ducts. The common bile duct measures up to 1.5 cm in diameter which is dilated. The main pancreatic duct is mildly dilated as well. Apparent 1.4 cm stone or mass in the region of the ampulla.   No fever, WBC 15k, no tachycardia  Started on Zosyn for now  ID consulted Dr Chetna Clemens GI consulted - plan for EUS/ERCP in AM  NPO After midnight, clears for now

## 2021-09-08 NOTE — H&P ADULT - ASSESSMENT
81F, ambulates independently, lives alone at home with [facility]HA [id number] (on 2nd floor, daughter Jodee  lives on 1st floor, daughter Delfina  lives 2 houses down) with PMH HTN, DM, PSH Diverticulitis needing exlap, p/w progressive jaundice x 1 week admitted for obstructive jaundice

## 2021-09-08 NOTE — ED ADULT NURSE NOTE - ED STAT RN HANDOFF WHERE
PT IS AA AND NO ACUTE DISTRESS NOTED .SAFETY MAINTAINED .MEDICATED AS ORDERED .FAMILY AT BED SIDE .REPORT GIVEN TO ELIESER GORDON

## 2021-09-08 NOTE — H&P ADULT - ATTENDING COMMENTS
81 year old F with HTN, DM, who was brought by daughter for yellowish discoloration of skin, abdominal pain and poor PO intake for two weeks. Daughter says jaundice started 3 days ago and worse today. Says that patient has been complaining of abdominal pain with eating for the past two weeks. Has had poor PO intake as such and has had 5-10 lb weight loss. Denies any fever but had an episode of chills last night, felt cold and legs were shaking per patient. Also reports urine has been dark    VSS  Exam with jaundiced skin, RUQ tenderness.  WBC 15. Cr 1.6. Bili 10, alk phos 650,      #Obstructive jaundice with RUQ abdominal pain - CT abd 1.5 cm CBD dilation and pancreatic duct mildly dilated, 1.4cm stone or mass in region of ampulla. Transaminitis obstructive pattern. Likely choledocholithiasis, although cannot rule out malignancy.   Will treat for cholangitis given leukocytosis, sx of chills, jaundice and RUQ tenderness with IV zosyn  Plan for EGD/EUS tomorrow per GI with potential ERCP  NPO after MN, continue clears for now  LR at 150cc/hr per GI recs  pain control     #SHIRA likely pre-renal, IVF as above  #HTN hold home meds, resume if hypertenive  #DM w/ hyperglycemia - IVF as above, Sliding scale, f/u A1C

## 2021-09-08 NOTE — H&P ADULT - PROBLEM SELECTOR PLAN 9
hold dvt prophylaxis for now as pt is for procedure  resume after procedure if ok with GI - lovenox 40 sq qd

## 2021-09-08 NOTE — H&P ADULT - PROBLEM SELECTOR PLAN 5
on janumet and glimeperide  FS ACHS  maintain fs 140-180  sliding scale  f/u a1c on home losartaqn hctz nifedipine  hold all htn meds for now  monitor bp  resume as needed

## 2021-09-09 ENCOUNTER — RESULT REVIEW (OUTPATIENT)
Age: 81
End: 2021-09-09

## 2021-09-09 DIAGNOSIS — K80.20 CALCULUS OF GALLBLADDER WITHOUT CHOLECYSTITIS WITHOUT OBSTRUCTION: ICD-10-CM

## 2021-09-09 LAB
ALBUMIN SERPL ELPH-MCNC: 2.4 G/DL — LOW (ref 3.5–5)
ALP SERPL-CCNC: 636 U/L — HIGH (ref 40–120)
ALT FLD-CCNC: 197 U/L DA — HIGH (ref 10–60)
ANION GAP SERPL CALC-SCNC: 8 MMOL/L — SIGNIFICANT CHANGE UP (ref 5–17)
AST SERPL-CCNC: 195 U/L — HIGH (ref 10–40)
BILIRUB DIRECT SERPL-MCNC: 8.9 MG/DL — HIGH (ref 0–0.2)
BILIRUB SERPL-MCNC: 11 MG/DL — HIGH (ref 0.2–1.2)
BUN SERPL-MCNC: 14 MG/DL — SIGNIFICANT CHANGE UP (ref 7–18)
CALCIUM SERPL-MCNC: 8.2 MG/DL — LOW (ref 8.4–10.5)
CHLORIDE SERPL-SCNC: 95 MMOL/L — LOW (ref 96–108)
CO2 SERPL-SCNC: 28 MMOL/L — SIGNIFICANT CHANGE UP (ref 22–31)
CREAT SERPL-MCNC: 1.25 MG/DL — SIGNIFICANT CHANGE UP (ref 0.5–1.3)
GLUCOSE BLDC GLUCOMTR-MCNC: 242 MG/DL — HIGH (ref 70–99)
GLUCOSE BLDC GLUCOMTR-MCNC: 251 MG/DL — HIGH (ref 70–99)
GLUCOSE BLDC GLUCOMTR-MCNC: 252 MG/DL — HIGH (ref 70–99)
GLUCOSE BLDC GLUCOMTR-MCNC: 298 MG/DL — HIGH (ref 70–99)
GLUCOSE SERPL-MCNC: 248 MG/DL — HIGH (ref 70–99)
HCT VFR BLD CALC: 28.5 % — LOW (ref 34.5–45)
HGB BLD-MCNC: 10.1 G/DL — LOW (ref 11.5–15.5)
LIDOCAIN IGE QN: 4079 U/L — HIGH (ref 73–393)
MAGNESIUM SERPL-MCNC: 1.4 MG/DL — LOW (ref 1.6–2.6)
MCHC RBC-ENTMCNC: 30 PG — SIGNIFICANT CHANGE UP (ref 27–34)
MCHC RBC-ENTMCNC: 35.4 GM/DL — SIGNIFICANT CHANGE UP (ref 32–36)
MCV RBC AUTO: 84.6 FL — SIGNIFICANT CHANGE UP (ref 80–100)
NRBC # BLD: 0 /100 WBCS — SIGNIFICANT CHANGE UP (ref 0–0)
PHOSPHATE SERPL-MCNC: 3.3 MG/DL — SIGNIFICANT CHANGE UP (ref 2.5–4.5)
PLATELET # BLD AUTO: 254 K/UL — SIGNIFICANT CHANGE UP (ref 150–400)
POTASSIUM SERPL-MCNC: 3.1 MMOL/L — LOW (ref 3.5–5.3)
POTASSIUM SERPL-SCNC: 3.1 MMOL/L — LOW (ref 3.5–5.3)
PROT SERPL-MCNC: 7 G/DL — SIGNIFICANT CHANGE UP (ref 6–8.3)
RBC # BLD: 3.37 M/UL — LOW (ref 3.8–5.2)
RBC # FLD: 17.2 % — HIGH (ref 10.3–14.5)
SODIUM SERPL-SCNC: 131 MMOL/L — LOW (ref 135–145)
WBC # BLD: 13.47 K/UL — HIGH (ref 3.8–10.5)
WBC # FLD AUTO: 13.47 K/UL — HIGH (ref 3.8–10.5)

## 2021-09-09 PROCEDURE — 43264 ERCP REMOVE DUCT CALCULI: CPT | Mod: 59

## 2021-09-09 PROCEDURE — 88312 SPECIAL STAINS GROUP 1: CPT | Mod: 26

## 2021-09-09 PROCEDURE — 43235 EGD DIAGNOSTIC BRUSH WASH: CPT | Mod: 59

## 2021-09-09 PROCEDURE — 99233 SBSQ HOSP IP/OBS HIGH 50: CPT | Mod: GC

## 2021-09-09 PROCEDURE — 43274 ERCP DUCT STENT PLACEMENT: CPT

## 2021-09-09 PROCEDURE — 88305 TISSUE EXAM BY PATHOLOGIST: CPT | Mod: 26

## 2021-09-09 PROCEDURE — 43261 ENDO CHOLANGIOPANCREATOGRAPH: CPT | Mod: 59

## 2021-09-09 RX ORDER — POTASSIUM CHLORIDE 20 MEQ
40 PACKET (EA) ORAL ONCE
Refills: 0 | Status: COMPLETED | OUTPATIENT
Start: 2021-09-09 | End: 2021-09-09

## 2021-09-09 RX ORDER — INSULIN LISPRO 100/ML
VIAL (ML) SUBCUTANEOUS
Refills: 0 | Status: DISCONTINUED | OUTPATIENT
Start: 2021-09-09 | End: 2021-09-15

## 2021-09-09 RX ORDER — PANTOPRAZOLE SODIUM 20 MG/1
40 TABLET, DELAYED RELEASE ORAL DAILY
Refills: 0 | Status: DISCONTINUED | OUTPATIENT
Start: 2021-09-09 | End: 2021-09-15

## 2021-09-09 RX ORDER — INDOMETHACIN 50 MG
100 CAPSULE ORAL ONCE
Refills: 0 | Status: COMPLETED | OUTPATIENT
Start: 2021-09-09 | End: 2021-09-09

## 2021-09-09 RX ORDER — INSULIN GLARGINE 100 [IU]/ML
9 INJECTION, SOLUTION SUBCUTANEOUS AT BEDTIME
Refills: 0 | Status: DISCONTINUED | OUTPATIENT
Start: 2021-09-09 | End: 2021-09-13

## 2021-09-09 RX ORDER — MAGNESIUM SULFATE 500 MG/ML
2 VIAL (ML) INJECTION ONCE
Refills: 0 | Status: COMPLETED | OUTPATIENT
Start: 2021-09-09 | End: 2021-09-09

## 2021-09-09 RX ORDER — HEPARIN SODIUM 5000 [USP'U]/ML
5000 INJECTION INTRAVENOUS; SUBCUTANEOUS EVERY 8 HOURS
Refills: 0 | Status: DISCONTINUED | OUTPATIENT
Start: 2021-09-10 | End: 2021-09-15

## 2021-09-09 RX ORDER — OLANZAPINE 15 MG/1
2.5 TABLET, FILM COATED ORAL ONCE
Refills: 0 | Status: COMPLETED | OUTPATIENT
Start: 2021-09-09 | End: 2021-09-09

## 2021-09-09 RX ORDER — SODIUM CHLORIDE 9 MG/ML
1000 INJECTION, SOLUTION INTRAVENOUS
Refills: 0 | Status: DISCONTINUED | OUTPATIENT
Start: 2021-09-09 | End: 2021-09-15

## 2021-09-09 RX ORDER — INSULIN LISPRO 100/ML
VIAL (ML) SUBCUTANEOUS AT BEDTIME
Refills: 0 | Status: DISCONTINUED | OUTPATIENT
Start: 2021-09-09 | End: 2021-09-15

## 2021-09-09 RX ADMIN — DONEPEZIL HYDROCHLORIDE 10 MILLIGRAM(S): 10 TABLET, FILM COATED ORAL at 21:41

## 2021-09-09 RX ADMIN — Medication 50 GRAM(S): at 16:50

## 2021-09-09 RX ADMIN — SODIUM CHLORIDE 150 MILLILITER(S): 9 INJECTION, SOLUTION INTRAVENOUS at 16:50

## 2021-09-09 RX ADMIN — PIPERACILLIN AND TAZOBACTAM 25 GRAM(S): 4; .5 INJECTION, POWDER, LYOPHILIZED, FOR SOLUTION INTRAVENOUS at 21:41

## 2021-09-09 RX ADMIN — Medication 1: at 21:41

## 2021-09-09 RX ADMIN — INSULIN GLARGINE 9 UNIT(S): 100 INJECTION, SOLUTION SUBCUTANEOUS at 21:38

## 2021-09-09 NOTE — CONSULT NOTE ADULT - GASTROINTESTINAL DETAILS
soft/no distention/no masses palpable/bowel sounds normal/no rebound tenderness/no guarding/no rigidity/no organomegaly

## 2021-09-09 NOTE — PROGRESS NOTE ADULT - ASSESSMENT
81F, ambulates independently, lives alone at home with [facility] [id number] (on 2nd floor, daughter Jodee  lives on 1st floor, daughter Delfina  lives 2 houses down) with PMH HTN, DM, PSH Diverticulitis needing exlap, p/w progressive jaundice x 1 week admitted for obstructive jaundice

## 2021-09-09 NOTE — PROGRESS NOTE ADULT - PROBLEM SELECTOR PLAN 2
- likely due to gallstone  - trend Lipase s/p ERCP  - monitor abdominal pain  - continue fluid hydration  - pain regimen  - ADAT

## 2021-09-09 NOTE — PROGRESS NOTE ADULT - SUBJECTIVE AND OBJECTIVE BOX
ENDOSCOPIC ULTRASOUND (EUS) NOTE    Indication: Pancreatic and bile duct dilation on CT, jaundice, cholangitis.     ASA class: 3    Anticoagulation/antiplatelets: None     Referring MD: Kevin Hager    Medications administered: General anesthesia    Consent: Informed consent was obtained from the patient's daughter/HCP Delfina prior to exam after providing any opportunities for questions    Preparation: NPO.    Instrument #: [id number], [id number]  Start time (EGD): 1228  End time (EGD): 1230  Start time (EUS): 1232  End time (EUS): 1242      Findings:  EGD:   Esophagus: Mild LA grade A esophagitis at GE junction.   Stomach: Normal.   Duodenum: Bulging, prominent major papilla with pus seen at the orifice.     EUS:  Biliary tree: The common bile duct was grossly dilated up to 15 mm in maximal caliber up to the ampulla which was prominent with wall thickening, favor inflammatory, appearance overall suspicious for a possible type 3 choledochocele. There were multiple hyperechoic lesions, some with shadowing consistent with extensive sludge and stones. No definitive biliary mass seen.   Pancreas: There was mild lobularity and diffuse echogenicity seen throughout the pancreas consistent with pancreatitis changes. No definitive pancreatic mass seen. The pancreatic duct was not dilated.   Ampulla: Prominent as above.   Liver: Normal visualized portions of the left lobe.   Lymph nodes: No lymphadenopathy seen.           ASSESSMENT:  Dilated distal bile duct with grossly prominent major papilla suspicious for type 3 choledochocele with extensive sludge and stones seen proximally.   No definitive biliary, ampullary, or pancreatic mass seen.     PLAN:   Proceed with ERCP today.                   Attending: James Clemens MD

## 2021-09-09 NOTE — PROGRESS NOTE ADULT - PROBLEM SELECTOR PLAN 1
Bilirubin 10.4, INR 1.15  Liver function with obstructive pattern - likely choledocholithiasis, cholangitis  CT AP Shows: Dilated intra and extrahepatic biliary ducts. The common bile duct measures up to 1.5 cm in diameter which is dilated. The main pancreatic duct is mildly dilated as well. Apparent 1.4 cm stone or mass in the region of the ampulla.   No fever, WBC 15k, no tachycardia  - on Zosyn for now  -ID consulted Dr Basilio agree with Zosyn  -Dr. Jayleen FOOTE consulted - plan for EUS/ERCP in AM  -has been NPO After midnight, clears for now

## 2021-09-09 NOTE — CONSULT NOTE ADULT - SUBJECTIVE AND OBJECTIVE BOX
HPI:  81F, ambulates independently, lives alone at home with [facility] [id number] (on 2nd floor, daughter Jodee  lives on 1st floor, daughter Delfina  lives 2 houses down) with PMH HTN, DM, PSH Diverticulitis needing exlap, p/w progressive jaundice x 1 week. As per daughter Delfina at bedside, pt was noted to be jaundiced about 2 days prior to admission. She was overtly yellow on the day of admission which prompted ED visit. Pt has been complaining about intermittent epigastric associated meals. Pain currently is similar now. Patient has been having pain x 1 week and has not been eating to avoid experiencing pain. Pt with chills the night prior to admission, but temperature was not taken. Pt has lost 8lb in the last 2 weeks due to abdominal pain and food aversion. Pt with pruritus 2 days prior to admission and increasing sugars 270s vs 140s 1 week prior.    Pt is recently diagnosed with dementia x1yr since getting COVID in . As per family, patient has outbursts of anger and has poor short term memory during the interview. (08 Sep 2021 18:06)      PAST MEDICAL & SURGICAL HISTORY:  DM (diabetes mellitus)    HTN (hypertension)    Reflux    HLD (hyperlipidemia)    Neuropathy of lower extremity    RA (rheumatoid arthritis)    Deep vein thrombosis (DVT), right    Knee pain, chronic, right    S/P eye surgery, follow-up exam  right eye    S/P colon resection    S/P appendectomy    Total knee replacement status  left    S/P partial hysterectomy        No Known Allergies      Meds:  acetaminophen   Tablet .. 325 milliGRAM(s) Oral every 12 hours PRN  donepezil 10 milliGRAM(s) Oral at bedtime  insulin lispro (ADMELOG) corrective regimen sliding scale   SubCutaneous Before meals and at bedtime  lactated ringers. 1000 milliLiter(s) IV Continuous <Continuous>  pantoprazole  Injectable 40 milliGRAM(s) IV Push daily  piperacillin/tazobactam IVPB.. 3.375 Gram(s) IV Intermittent every 8 hours  traMADol 25 milliGRAM(s) Oral every 8 hours PRN      SOCIAL HISTORY:  Smoker:  YES / NO        PACK YEARS:                         WHEN QUIT?  ETOH use:  YES / NO               FREQUENCY / QUANTITY:  Ilicit Drug use:  YES / NO  Occupation:  Assisted device use (Cane / Walker):  Live with:    FAMILY HISTORY:  Family history of diabetes mellitus (Sibling)  Type 2        VITALS:  Vital Signs Last 24 Hrs  T(C): 36.4 (08 Sep 2021 19:45), Max: 36.6 (08 Sep 2021 15:58)  T(F): 97.6 (08 Sep 2021 19:45), Max: 97.8 (08 Sep 2021 15:58)  HR: 71 (09 Sep 2021 00:10) (55 - 71)  BP: 132/63 (09 Sep 2021 00:10) (132/57 - 151/62)  BP(mean): --  RR: 18 (09 Sep 2021 00:10) (18 - 20)  SpO2: 97% (09 Sep 2021 00:10) (93% - 97%)    LABS/DIAGNOSTIC TESTS:                          10.3   15.29 )-----------( 288      ( 08 Sep 2021 11:16 )             29.1     WBC Count: 15.29 K/uL ( @ 11:16)          131<L>  |  93<L>  |  19<H>  ----------------------------<  411<H>  3.7   |  28  |  1.56<H>    Ca    8.6      08 Sep 2021 11:16    TPro  7.3  /  Alb  2.6<L>  /  TBili  10.4<H>  /  DBili  x   /  AST  250<H>  /  ALT  249<H>  /  AlkPhos  640<H>        Urinalysis Basic - ( 08 Sep 2021 14:55 )    Color: Sarah / Appearance: Clear / S.010 / pH: x  Gluc: x / Ketone: Negative  / Bili: Large / Urobili: 8   Blood: x / Protein: 30 mg/dL / Nitrite: Negative   Leuk Esterase: Trace / RBC: 2-5 /HPF / WBC 6-10 /HPF   Sq Epi: x / Non Sq Epi: Many /HPF / Bacteria: Moderate /HPF        LIVER FUNCTIONS - ( 08 Sep 2021 11:16 )  Alb: 2.6 g/dL / Pro: 7.3 g/dL / ALK PHOS: 640 U/L / ALT: 249 U/L DA / AST: 250 U/L / GGT: x             PT/INR - ( 08 Sep 2021 15:44 )   PT: 13.6 sec;   INR: 1.15 ratio         PTT - ( 08 Sep 2021 15:44 )  PTT:31.5 sec    LACTATE:    ABG -     CULTURES:       RADIOLOGY:< from: CT Abdomen and Pelvis w/ IV Cont (21 @ 14:35) >  EXAM:  CT ABDOMEN AND PELVIS IC                            PROCEDURE DATE:  2021          INTERPRETATION:  CLINICAL INFORMATION: Severe abdominal pain and jaundice    COMPARISON: 10/19/2017    CONTRAST/COMPLICATIONS:  IV Contrast: Omnipaque 350  90 cc administered   10 cc discarded  Oral Contrast: NONE  Complications: None reported at time of study completion    PROCEDURE:  CT of the Abdomen and Pelvis was performed.  Sagittal and coronal reformats were performed.    FINDINGS:  LOWER CHEST: Small bilateral atelectasis.    LIVER: Hepatic steatosis.  BILE DUCTS: Dilated intra and extrahepatic biliary ducts. The common bile duct measures up to 1.5 cm in diameter which is dilated. Apparent 1.4 cm stone or mass in the region of the ampulla(image 72 series 2).  GALLBLADDER: Small gallstones in the distended gallbladder.  SPLEEN: Punctate calcifications are again seen in the spleen, likely due to prior granulomatous disease.  PANCREAS: Atrophic pancreas. Mildly dilated main pancreatic duct measuring 4 mm in diameter.  ADRENALS: The right adrenal appears unremarkable. Nonspecific mild left adrenal thickening.  KIDNEYS/URETERS: Small cyst in the upper pole of the right kidney. Tiny hypodense lesion in the lower pole of the right kidney, too small to characterize. The left kidney appears unremarkable.    BLADDER: Within normal limits.  REPRODUCTIVE ORGANS: The uterus is not visualized, likely surgically absent.    BOWEL: No bowel obstruction. Appendix not visualized. No secondary signs for acute appendicitis. Colonic diverticulosis without evidence for diverticulitis.  PERITONEUM: No free air or ascites.  VESSELS: Calcified atherosclerotic disease.  RETROPERITONEUM/LYMPH NODES: No lymphadenopathy.  ABDOMINAL WALL: Within normallimits.  BONES: Degenerative spondylosis. Grade 1 anterolisthesis at L4-5.    IMPRESSION:  Dilated intra and extrahepatic biliary ducts. The common bile duct measures up to 1.5 cm in diameter which is dilated. The main pancreatic duct is mildly dilated as well. Apparent 1.4 cm stone or mass in the region of the ampulla. If clinically indicated, ERCP/endoscopic ultrasound may be pursued for further evaluation.    Small gallstones in the distended gallbladder.    --- End of Report ---            PHILOMENA AMSTERSON MD; Attending Radiologist  This document has been electronically signed. Sep  8 2021  3:02PM    < end of copied text >        ROS  [  ] UNABLE TO ELICIT               HPI:  81F, ambulates independently, lives alone at home with [facility] [id number] (on 2nd floor, daughter Jodee  lives on 1st floor, daughter Delfina  lives 2 houses down) with PMH HTN, DM, PSH Diverticulitis needing exlap, p/w progressive jaundice x 1 week. As per daughter Delfina at bedside, pt was noted to be jaundiced about 2 days prior to admission. She was overtly yellow on the day of admission which prompted ED visit. Pt has been complaining about intermittent epigastric associated meals. Pain currently is similar now. Patient has been having pain x 1 week and has not been eating to avoid experiencing pain. Pt with chills the night prior to admission, but temperature was not taken. Pt has lost 8lb in the last 2 weeks due to abdominal pain and food aversion. Pt with pruritus 2 days prior to admission and increasing sugars 270s vs 140s 1 week prior.  Pt is recently diagnosed with dementia x1yr since getting COVID in . As per family, patient has outbursts of anger and has poor short term memory during the interview. (08 Sep 2021 18:06)      History as above      PAST MEDICAL & SURGICAL HISTORY:  DM (diabetes mellitus)    HTN (hypertension)    Reflux    HLD (hyperlipidemia)    Neuropathy of lower extremity    RA (rheumatoid arthritis)    Deep vein thrombosis (DVT), right    Knee pain, chronic, right    S/P eye surgery, follow-up exam  right eye    S/P colon resection    S/P appendectomy    Total knee replacement status  left    S/P partial hysterectomy        No Known Allergies      Meds:  acetaminophen   Tablet .. 325 milliGRAM(s) Oral every 12 hours PRN  donepezil 10 milliGRAM(s) Oral at bedtime  insulin lispro (ADMELOG) corrective regimen sliding scale   SubCutaneous Before meals and at bedtime  lactated ringers. 1000 milliLiter(s) IV Continuous <Continuous>  pantoprazole  Injectable 40 milliGRAM(s) IV Push daily  piperacillin/tazobactam IVPB.. 3.375 Gram(s) IV Intermittent every 8 hours  traMADol 25 milliGRAM(s) Oral every 8 hours PRN      SOCIAL HISTORY:  Smoker:  YES / NO        PACK YEARS:                         WHEN QUIT?  ETOH use:  YES / NO               FREQUENCY / QUANTITY:  Ilicit Drug use:  YES / NO  Occupation:  Assisted device use (Cane / Walker):  Live with:    FAMILY HISTORY:  Family history of diabetes mellitus (Sibling)  Type 2        VITALS:  Vital Signs Last 24 Hrs  T(C): 36.4 (08 Sep 2021 19:45), Max: 36.6 (08 Sep 2021 15:58)  T(F): 97.6 (08 Sep 2021 19:45), Max: 97.8 (08 Sep 2021 15:58)  HR: 71 (09 Sep 2021 00:10) (55 - 71)  BP: 132/63 (09 Sep 2021 00:10) (132/57 - 151/62)  BP(mean): --  RR: 18 (09 Sep 2021 00:10) (18 - 20)  SpO2: 97% (09 Sep 2021 00:10) (93% - 97%)    LABS/DIAGNOSTIC TESTS:                          10.3   15.29 )-----------( 288      ( 08 Sep 2021 11:16 )             29.1     WBC Count: 15.29 K/uL ( @ 11:16)          131<L>  |  93<L>  |  19<H>  ----------------------------<  411<H>  3.7   |  28  |  1.56<H>    Ca    8.6      08 Sep 2021 11:16    TPro  7.3  /  Alb  2.6<L>  /  TBili  10.4<H>  /  DBili  x   /  AST  250<H>  /  ALT  249<H>  /  AlkPhos  640<H>        Urinalysis Basic - ( 08 Sep 2021 14:55 )    Color: Sarah / Appearance: Clear / S.010 / pH: x  Gluc: x / Ketone: Negative  / Bili: Large / Urobili: 8   Blood: x / Protein: 30 mg/dL / Nitrite: Negative   Leuk Esterase: Trace / RBC: 2-5 /HPF / WBC 6-10 /HPF   Sq Epi: x / Non Sq Epi: Many /HPF / Bacteria: Moderate /HPF        LIVER FUNCTIONS - ( 08 Sep 2021 11:16 )  Alb: 2.6 g/dL / Pro: 7.3 g/dL / ALK PHOS: 640 U/L / ALT: 249 U/L DA / AST: 250 U/L / GGT: x             PT/INR - ( 08 Sep 2021 15:44 )   PT: 13.6 sec;   INR: 1.15 ratio         PTT - ( 08 Sep 2021 15:44 )  PTT:31.5 sec    LACTATE:    ABG -     CULTURES:       RADIOLOGY:< from: CT Abdomen and Pelvis w/ IV Cont (21 @ 14:35) >  EXAM:  CT ABDOMEN AND PELVIS IC                            PROCEDURE DATE:  2021          INTERPRETATION:  CLINICAL INFORMATION: Severe abdominal pain and jaundice    COMPARISON: 10/19/2017    CONTRAST/COMPLICATIONS:  IV Contrast: Omnipaque 350  90 cc administered   10 cc discarded  Oral Contrast: NONE  Complications: None reported at time of study completion    PROCEDURE:  CT of the Abdomen and Pelvis was performed.  Sagittal and coronal reformats were performed.    FINDINGS:  LOWER CHEST: Small bilateral atelectasis.    LIVER: Hepatic steatosis.  BILE DUCTS: Dilated intra and extrahepatic biliary ducts. The common bile duct measures up to 1.5 cm in diameter which is dilated. Apparent 1.4 cm stone or mass in the region of the ampulla(image 72 series 2).  GALLBLADDER: Small gallstones in the distended gallbladder.  SPLEEN: Punctate calcifications are again seen in the spleen, likely due to prior granulomatous disease.  PANCREAS: Atrophic pancreas. Mildly dilated main pancreatic duct measuring 4 mm in diameter.  ADRENALS: The right adrenal appears unremarkable. Nonspecific mild left adrenal thickening.  KIDNEYS/URETERS: Small cyst in the upper pole of the right kidney. Tiny hypodense lesion in the lower pole of the right kidney, too small to characterize. The left kidney appears unremarkable.    BLADDER: Within normal limits.  REPRODUCTIVE ORGANS: The uterus is not visualized, likely surgically absent.    BOWEL: No bowel obstruction. Appendix not visualized. No secondary signs for acute appendicitis. Colonic diverticulosis without evidence for diverticulitis.  PERITONEUM: No free air or ascites.  VESSELS: Calcified atherosclerotic disease.  RETROPERITONEUM/LYMPH NODES: No lymphadenopathy.  ABDOMINAL WALL: Within normallimits.  BONES: Degenerative spondylosis. Grade 1 anterolisthesis at L4-5.    IMPRESSION:  Dilated intra and extrahepatic biliary ducts. The common bile duct measures up to 1.5 cm in diameter which is dilated. The main pancreatic duct is mildly dilated as well. Apparent 1.4 cm stone or mass in the region of the ampulla. If clinically indicated, ERCP/endoscopic ultrasound may be pursued for further evaluation.    Small gallstones in the distended gallbladder.    --- End of Report ---            PHILOMENA MASTERSON MD; Attending Radiologist  This document has been electronically signed. Sep  8 2021  3:02PM    < end of copied text >        ROS  [  ] UNABLE TO ELICIT               HPI:  81F, ambulates independently, lives alone at home with [facility] [id number] (on 2nd floor, daughter Jodee  lives on 1st floor, daughter Delfina  lives 2 houses down) with PMH HTN, DM, PSH Diverticulitis needing exlap, p/w progressive jaundice x 1 week. As per daughter Delfina at bedside, pt was noted to be jaundiced about 2 days prior to admission. She was overtly yellow on the day of admission which prompted ED visit. Pt has been complaining about intermittent epigastric associated meals. Pain currently is similar now. Patient has been having pain x 1 week and has not been eating to avoid experiencing pain. Pt with chills the night prior to admission, but temperature was not taken. Pt has lost 8lb in the last 2 weeks due to abdominal pain and food aversion. Pt with pruritus 2 days prior to admission and increasing sugars 270s vs 140s 1 week prior.  Pt is recently diagnosed with dementia x1yr since getting COVID in . As per family, patient has outbursts of anger and has poor short term memory during the interview. (08 Sep 2021 18:06)      History as above, Pt who was admitted with abdominal pain and jaundice x 2 days along with chills, she was found to have acute cholecystitis here and is currently obtunded and is unable to answer any questions, she appears very jaundice.       PAST MEDICAL & SURGICAL HISTORY:  DM (diabetes mellitus)    HTN (hypertension)    Reflux    HLD (hyperlipidemia)    Neuropathy of lower extremity    RA (rheumatoid arthritis)    Deep vein thrombosis (DVT), right    Knee pain, chronic, right    S/P eye surgery, follow-up exam  right eye    S/P colon resection    S/P appendectomy    Total knee replacement status  left    S/P partial hysterectomy        No Known Allergies      Meds:  acetaminophen   Tablet .. 325 milliGRAM(s) Oral every 12 hours PRN  donepezil 10 milliGRAM(s) Oral at bedtime  insulin lispro (ADMELOG) corrective regimen sliding scale   SubCutaneous Before meals and at bedtime  lactated ringers. 1000 milliLiter(s) IV Continuous <Continuous>  pantoprazole  Injectable 40 milliGRAM(s) IV Push daily  piperacillin/tazobactam IVPB.. 3.375 Gram(s) IV Intermittent every 8 hours  traMADol 25 milliGRAM(s) Oral every 8 hours PRN      SOCIAL HISTORY: unknown    FAMILY HISTORY:  Family history of diabetes mellitus (Sibling)  Type 2        VITALS:  Vital Signs Last 24 Hrs  T(C): 36.4 (08 Sep 2021 19:45), Max: 36.6 (08 Sep 2021 15:58)  T(F): 97.6 (08 Sep 2021 19:45), Max: 97.8 (08 Sep 2021 15:58)  HR: 71 (09 Sep 2021 00:10) (55 - 71)  BP: 132/63 (09 Sep 2021 00:10) (132/57 - 151/62)  BP(mean): --  RR: 18 (09 Sep 2021 00:10) (18 - 20)  SpO2: 97% (09 Sep 2021 00:10) (93% - 97%)    LABS/DIAGNOSTIC TESTS:                          10.3   15.29 )-----------( 288      ( 08 Sep 2021 11:16 )             29.1     WBC Count: 15.29 K/uL ( @ 11:16)          131<L>  |  93<L>  |  19<H>  ----------------------------<  411<H>  3.7   |  28  |  1.56<H>    Ca    8.6      08 Sep 2021 11:16    TPro  7.3  /  Alb  2.6<L>  /  TBili  10.4<H>  /  DBili  x   /  AST  250<H>  /  ALT  249<H>  /  AlkPhos  640<H>        Urinalysis Basic - ( 08 Sep 2021 14:55 )    Color: Sarah / Appearance: Clear / S.010 / pH: x  Gluc: x / Ketone: Negative  / Bili: Large / Urobili: 8   Blood: x / Protein: 30 mg/dL / Nitrite: Negative   Leuk Esterase: Trace / RBC: 2-5 /HPF / WBC 6-10 /HPF   Sq Epi: x / Non Sq Epi: Many /HPF / Bacteria: Moderate /HPF        LIVER FUNCTIONS - ( 08 Sep 2021 11:16 )  Alb: 2.6 g/dL / Pro: 7.3 g/dL / ALK PHOS: 640 U/L / ALT: 249 U/L DA / AST: 250 U/L / GGT: x             PT/INR - ( 08 Sep 2021 15:44 )   PT: 13.6 sec;   INR: 1.15 ratio         PTT - ( 08 Sep 2021 15:44 )  PTT:31.5 sec    LACTATE:    ABG -     CULTURES:       RADIOLOGY:< from: CT Abdomen and Pelvis w/ IV Cont (21 @ 14:35) >  EXAM:  CT ABDOMEN AND PELVIS IC                            PROCEDURE DATE:  2021          INTERPRETATION:  CLINICAL INFORMATION: Severe abdominal pain and jaundice    COMPARISON: 10/19/2017    CONTRAST/COMPLICATIONS:  IV Contrast: Omnipaque 350  90 cc administered   10 cc discarded  Oral Contrast: NONE  Complications: None reported at time of study completion    PROCEDURE:  CT of the Abdomen and Pelvis was performed.  Sagittal and coronal reformats were performed.    FINDINGS:  LOWER CHEST: Small bilateral atelectasis.    LIVER: Hepatic steatosis.  BILE DUCTS: Dilated intra and extrahepatic biliary ducts. The common bile duct measures up to 1.5 cm in diameter which is dilated. Apparent 1.4 cm stone or mass in the region of the ampulla(image 72 series 2).  GALLBLADDER: Small gallstones in the distended gallbladder.  SPLEEN: Punctate calcifications are again seen in the spleen, likely due to prior granulomatous disease.  PANCREAS: Atrophic pancreas. Mildly dilated main pancreatic duct measuring 4 mm in diameter.  ADRENALS: The right adrenal appears unremarkable. Nonspecific mild left adrenal thickening.  KIDNEYS/URETERS: Small cyst in the upper pole of the right kidney. Tiny hypodense lesion in the lower pole of the right kidney, too small to characterize. The left kidney appears unremarkable.    BLADDER: Within normal limits.  REPRODUCTIVE ORGANS: The uterus is not visualized, likely surgically absent.    BOWEL: No bowel obstruction. Appendix not visualized. No secondary signs for acute appendicitis. Colonic diverticulosis without evidence for diverticulitis.  PERITONEUM: No free air or ascites.  VESSELS: Calcified atherosclerotic disease.  RETROPERITONEUM/LYMPH NODES: No lymphadenopathy.  ABDOMINAL WALL: Within normallimits.  BONES: Degenerative spondylosis. Grade 1 anterolisthesis at L4-5.    IMPRESSION:  Dilated intra and extrahepatic biliary ducts. The common bile duct measures up to 1.5 cm in diameter which is dilated. The main pancreatic duct is mildly dilated as well. Apparent 1.4 cm stone or mass in the region of the ampulla. If clinically indicated, ERCP/endoscopic ultrasound may be pursued for further evaluation.    Small gallstones in the distended gallbladder.    --- End of Report ---            PHILOMENA MASTERSON MD; Attending Radiologist  This document has been electronically signed. Sep  8 2021  3:02PM    < end of copied text >        OLMAN  [ x ] UNABLE TO ELICIT

## 2021-09-09 NOTE — PROGRESS NOTE ADULT - PROBLEM SELECTOR PLAN 2
Mid epigastric pain, jaundice, tenderness on exam. Lipase 12K CT shows  possible obstructive stone in the ampulla  Sx consistent with gallstone pancreatitis  -NPO  -IVF LR 150cc/hr for now  -Tramadol 25 q8 + Tylenol 325 q12 prn for pain control for now  -s/p 1 L bolus in ED  -Consider Dilaudid

## 2021-09-09 NOTE — PROGRESS NOTE ADULT - PROBLEM SELECTOR PLAN 6
on janumet and glimeperide, came in with sugars 400s. Likely d/t pancreatitis, and beta cell transient dysfunction.  FS ACHS  maintain fs 140-180  Add lantus 9 and sliding scale to determine nutritional  f/u a1c

## 2021-09-09 NOTE — PROGRESS NOTE ADULT - PROBLEM SELECTOR PLAN 7
Pt w/ SCr 1.5 on admission  Baseline SCr unknown  F/U Urine Lytes, calculate FeNa  IVF for now, follow BMP daily

## 2021-09-09 NOTE — PROGRESS NOTE ADULT - SUBJECTIVE AND OBJECTIVE BOX
PGY-1 Progress Note discussed with attending    PAGER #: [[phone]] TILL 5:00 PM  PLEASE CONTACT ON CALL TEAM:  - On Call Team (Please refer to Vicky) FROM 5:00 PM - 8:30PM  - Nightfloat Team FROM 8:30 -7:30 AM    CHIEF COMPLAINT & BRIEF HOSPITAL COURSE:  81F, ambulates independently, lives alone at home with HHA [id number] (on 2nd floor, daughter Jodee  lives on 1st floor, daughter  (HPI historian)  Delfina  lives 2 houses down) with PMH HTN, DM, PSH Diverticulitis needing exlap.    She p/w progressive jaundice x 1 week worsened over 2 days prior to admission. She was overtly yellow on the day of admission which prompted ED visit.     Pt has been complaining about intermittent epigastric postprandial pain. SX are associated with chills, tactile fevers, pruritus, elevated home FS sugars and decreased PO intake and subsequent 8lb weight loss in the last 2 weeks      Pt is recently diagnosed with dementia x1yr since getting COVID in 2020. As per family, patient has outbursts of anger and has poor short term memory during the interview.    In the ED: [facility], On  Exam: epigastric tenderness and jaundice on face.  Labs: WCBC 15, Hgb 10.3, Na 131, Cr 1.56, glucose 400s Bili 10.4, Alk phos 640 ALST 250  , lipase 17815s  Urine contaminated  Hepatitis panel non reactive  CT Abd- dilated CBD, Dilated pancreatic duct, stone in ampulla and small stones in the galbladder.    She was admitted for obstructive jaundice and work up of choledocholithiasis vs cholangitis, and for subsequent gal stone pancreatitis.      INTERVAL HPI/OVERNIGHT EVENTS:   No events overnight  transient agitation and combative behavior this AM lasting about 10min total  Patient was seen and examined at bedside, she did not  recall the incidents, and is AA)x1  Otherwise, once calm she endorsed some back pain and abdominal pain on palpation  no new sx    MEDICATIONS  (STANDING):  donepezil 10 milliGRAM(s) Oral at bedtime  insulin glargine Injectable (LANTUS) 9 Unit(s) SubCutaneous at bedtime  insulin lispro (ADMELOG) corrective regimen sliding scale   SubCutaneous three times a day before meals  insulin lispro (ADMELOG) corrective regimen sliding scale   SubCutaneous at bedtime  lactated ringers. 1000 milliLiter(s) (150 mL/Hr) IV Continuous <Continuous>  pantoprazole  Injectable 40 milliGRAM(s) IV Push daily  piperacillin/tazobactam IVPB.. 3.375 Gram(s) IV Intermittent every 8 hours    MEDICATIONS  (PRN):  acetaminophen   Tablet .. 325 milliGRAM(s) Oral every 12 hours PRN Temp greater or equal to 38C (100.4F), Mild Pain (1 - 3)  traMADol 25 milliGRAM(s) Oral every 8 hours PRN Severe Pain (7 - 10)      REVIEW OF SYSTEMS:  CONSTITUTIONAL: No fever, weight loss, or fatigue  RESPIRATORY: No cough, wheezing, chills or hemoptysis; No shortness of breath  CARDIOVASCULAR: No chest pain, palpitations, dizziness, or leg swelling  GASTROINTESTINAL: No abdominal pain. No nausea, vomiting, or hematemesis; No diarrhea or constipation. No melena or hematochezia.  GENITOURINARY: No dysuria or hematuria, urinary frequency  NEUROLOGICAL: No headaches, memory loss, loss of strength, numbness, or tremors  SKIN: No itching, burning, rashes, or lesions     Vital Signs Last 24 Hrs  T(C): --  T(F): --  HR: 53 (09 Sep 2021 16:54) (53 - 71)  BP: 120/69 (09 Sep 2021 16:54) (120/69 - 132/63)  BP(mean): --  RR: 18 (09 Sep 2021 16:54) (18 - 18)  SpO2: 97% (09 Sep 2021 16:54) (97% - 97%)    PHYSICAL EXAMINATION:  GENERAL: NAD, well built  HEAD:  Atraumatic, Normocephalic  EYES:  conjunctiva and sclera clear  NECK: Supple, No JVD, Normal thyroid  CHEST/LUNG: Clear to auscultation. Clear to percussion bilaterally; No rales, rhonchi, wheezing, or rubs  HEART: Regular rate and rhythm; No murmurs, rubs, or gallops  ABDOMEN: Soft, Nontender, Nondistended; Bowel sounds present  NERVOUS SYSTEM:  Alert & Oriented X3,    EXTREMITIES:  2+ Peripheral Pulses, No clubbing, cyanosis, or edema  SKIN: warm dry                          10.1   13.47 )-----------( 254      ( 09 Sep 2021 15:16 )             28.5     09-09    131<L>  |  95<L>  |  14  ----------------------------<  248<H>  3.1<L>   |  28  |  1.25    Ca    8.2<L>      09 Sep 2021 15:16  Phos  3.3     09-09  Mg     1.4     09-09    TPro  7.0  /  Alb  2.4<L>  /  TBili  11.0<H>  /  DBili  8.9<H>  /  AST  195<H>  /  ALT  197<H>  /  AlkPhos  636<H>  09-09    LIVER FUNCTIONS - ( 09 Sep 2021 15:16 )  Alb: 2.4 g/dL / Pro: 7.0 g/dL / ALK PHOS: 636 U/L / ALT: 197 U/L DA / AST: 195 U/L / GGT: x           CARDIAC MARKERS ( 08 Sep 2021 11:16 )  <0.015 ng/mL / x     / x     / x     / x          PT/INR - ( 08 Sep 2021 15:44 )   PT: 13.6 sec;   INR: 1.15 ratio         PTT - ( 08 Sep 2021 15:44 )  PTT:31.5 sec    CAPILLARY BLOOD GLUCOSE      RADIOLOGY & ADDITIONAL TESTS:

## 2021-09-09 NOTE — CONSULT NOTE ADULT - ASSESSMENT
Acute Cholecystitis  Leukocytosis     Plan - Cont Zosyn 3.375gms iv q8hrs  s/p ERCP and stone extraction.  might need Cholecystectomy

## 2021-09-09 NOTE — PROGRESS NOTE ADULT - SUBJECTIVE AND OBJECTIVE BOX
ENDOSCOPIC RETROGRADE CHOLANGIO-PANCREATOGRAPHY (ERCP) NOTE    Indication: Cholangitis, biliary obstruction    ASA class: 3    Anticoagulation/antiplatelets: None    Referring MD: Kevin Hager    Medications administered: General anesthesia, Indomethacin 100mg VA, Zosyn x1.     Consent: Informed consent was obtained from the patient's daughter/HCP prior to exam after providing any opportunities for questions.    Preparation: NPO.    Instrument #: [id number]  Start time: 1303  End time: 1336    EBL: Minimal.         Findings:  The  film was normal. The esophagus was successfully intubated under direct vision. The scope was advanced to the major papilla in the descending duodenum without detailed examination of the pharynx, larynx, associated structures, and upper GI tract. The upper GI tract was grossly normal. The major papilla was extremely prominent, bulging with pus seen emerging from the orifice, suspicious for type 3 choledochocele. A 0.035 inch x 450 cm straight Hydra Jagwire was passed into the biliary tree. A Hydratome sphincterotome was passed over the guidewire and the bile duct was deeply cannulated. Contrast was injected. The biliary tree was grossly dilated up to 15 mm in maximal caliber. There were multiple filling defects consistent with stones, sludge, and pus. Biliary sphincterotomy was made with the Hydratome sphincterotome using ERBE electrocautery. The biliary tree was then swept with a 12-15 mm balloon starting at the bifurcation. Extensive amount of sludge, stones, and pus were swept from the duct. The prominent ampulla was then biopsied (away from the pancreatic duct orifice) with a cold forceps for histology to rule out malignancy. There was mild post-sphincterotomy bleeding which was partially treated with balloon tamponade. Given the extensive sludge and pus and post-sphincterotomy bleeding, the decision was made to place a fully covered metal biliary stent. One 10 mm by 4 cm fully covered metal stent (WallFlex) was then placed. There was no bleeding at conclusion. The stent was in excellent position. There was excellent drainage of bile and contrast at the conclusion of the procedure. The patient tolerated the procedure well.       ASSESSMENT:  Dilated biliary tree with extremely prominent ampulla, appearance suspicious for type 3 choledochocele but ampulla biopsied to rule out malignancy.   Extensive amount of sludge, stones, and pus in the bile duct. Ductal clearance achieved by biliary sphincterotomy and balloon extraction.  Given purulence and mild post-sphincterotomy bleeding encountered, one 10 mm by 4 cm fully covered metal biliary stent (WallFlex) placed.   Excellent drainage of bile and contrast at conclusion.   No bleeding encountered at conclusion.       PLAN:   [facility].   Clear liquid diet today, advance as tolerated tomorrow.   Trend LFTs.   Follow-up pathology results.   Continue antibiotics for cholangitis.   Avoid NSAIDs x2 weeks if possible.  Pantoprazole 40mg orally once daily x2 weeks.   Recommend abdominal XR in 2-3 months to ensure stent passage.   Please call with any questions or concerns.               Attending: James Clemens MD

## 2021-09-09 NOTE — PROGRESS NOTE ADULT - PROBLEM SELECTOR PLAN 1
- Elevated WBC, LFTs, obstructive jaundice  - started on Zosyn, continue per ID, discussed with Dr. Basilio  - CT A/P with dilated intra/extra hepatic biliary duct dilation with retained stone in CBD  - Dr. Clemens consulted, patient s/p ERCP with sphincterotomy, removal of stone and placement of metallic stent  - can start CLD if patient hungry

## 2021-09-09 NOTE — PROGRESS NOTE ADULT - PROBLEM SELECTOR PLAN 5
- Cr 1.54, unknown baseline, suspect pre-renal ATN from sepsis  - patient refused labs this morning  - f/u labs tomorrow after fluid hydration

## 2021-09-09 NOTE — PROGRESS NOTE ADULT - SUBJECTIVE AND OBJECTIVE BOX
S: Patient following commands, denies pain.    O:  Vital Signs Last 24 Hrs  T(C): 36.4 (08 Sep 2021 19:45), Max: 36.4 (08 Sep 2021 19:45)  T(F): 97.6 (08 Sep 2021 19:45), Max: 97.6 (08 Sep 2021 19:45)  HR: 53 (09 Sep 2021 16:54) (53 - 71)  BP: 120/69 (09 Sep 2021 16:54) (120/69 - 151/62)  BP(mean): --  RR: 18 (09 Sep 2021 16:54) (18 - 20)  SpO2: 97% (09 Sep 2021 16:54) (96% - 97%)    GENERAL: NAD, elderly  HEAD:  Atraumatic, Normocephalic  EYES: EOMI, PERRLA, conjunctiva and sclera icteric  NECK: Supple, No JVD  CHEST/LUNG: Clear to auscultation bilaterally; No wheeze  HEART: Regular rate and rhythm; No murmurs, rubs, or gallops  ABDOMEN: Soft, Nontender, Nondistended; Bowel sounds present  EXTREMITIES:  2+ Peripheral Pulses, No clubbing, cyanosis, or edema  PSYCH: A&Ox0  NEUROLOGY: lethargic s/p procedure  SKIN: No rashes or lesions, jaundiced                          10.1   13.47 )-----------( 254      ( 09 Sep 2021 15:16 )             28.5       09-09    131<L>  |  95<L>  |  14  ----------------------------<  248<H>  3.1<L>   |  28  |  1.25    Ca    8.2<L>      09 Sep 2021 15:16  Phos  3.3     09-09  Mg     1.4     09-09    TPro  7.0  /  Alb  2.4<L>  /  TBili  11.0<H>  /  DBili  8.9<H>  /  AST  195<H>  /  ALT  197<H>  /  AlkPhos  636<H>  09-09    MEDICATIONS  (STANDING):  donepezil 10 milliGRAM(s) Oral at bedtime  insulin glargine Injectable (LANTUS) 9 Unit(s) SubCutaneous at bedtime  insulin lispro (ADMELOG) corrective regimen sliding scale   SubCutaneous three times a day before meals  insulin lispro (ADMELOG) corrective regimen sliding scale   SubCutaneous at bedtime  lactated ringers. 1000 milliLiter(s) (150 mL/Hr) IV Continuous <Continuous>  pantoprazole  Injectable 40 milliGRAM(s) IV Push daily  piperacillin/tazobactam IVPB.. 3.375 Gram(s) IV Intermittent every 8 hours    MEDICATIONS  (PRN):  acetaminophen   Tablet .. 325 milliGRAM(s) Oral every 12 hours PRN Temp greater or equal to 38C (100.4F), Mild Pain (1 - 3)  traMADol 25 milliGRAM(s) Oral every 8 hours PRN Severe Pain (7 - 10)

## 2021-09-10 ENCOUNTER — TRANSCRIPTION ENCOUNTER (OUTPATIENT)
Age: 81
End: 2021-09-10

## 2021-09-10 DIAGNOSIS — K80.33 CALCULUS OF BILE DUCT WITH ACUTE CHOLANGITIS WITH OBSTRUCTION: ICD-10-CM

## 2021-09-10 LAB
A1C WITH ESTIMATED AVERAGE GLUCOSE RESULT: 8.1 % — HIGH (ref 4–5.6)
ALBUMIN SERPL ELPH-MCNC: 2.5 G/DL — LOW (ref 3.5–5)
ALP SERPL-CCNC: 587 U/L — HIGH (ref 40–120)
ALT FLD-CCNC: 165 U/L DA — HIGH (ref 10–60)
ANION GAP SERPL CALC-SCNC: 9 MMOL/L — SIGNIFICANT CHANGE UP (ref 5–17)
AST SERPL-CCNC: 104 U/L — HIGH (ref 10–40)
BASOPHILS # BLD AUTO: 0.03 K/UL — SIGNIFICANT CHANGE UP (ref 0–0.2)
BASOPHILS NFR BLD AUTO: 0.3 % — SIGNIFICANT CHANGE UP (ref 0–2)
BILIRUB DIRECT SERPL-MCNC: 3 MG/DL — HIGH (ref 0–0.2)
BILIRUB SERPL-MCNC: 4.6 MG/DL — HIGH (ref 0.2–1.2)
BUN SERPL-MCNC: 16 MG/DL — SIGNIFICANT CHANGE UP (ref 7–18)
CALCIUM SERPL-MCNC: 8.6 MG/DL — SIGNIFICANT CHANGE UP (ref 8.4–10.5)
CHLORIDE SERPL-SCNC: 97 MMOL/L — SIGNIFICANT CHANGE UP (ref 96–108)
CO2 SERPL-SCNC: 30 MMOL/L — SIGNIFICANT CHANGE UP (ref 22–31)
CREAT ?TM UR-MCNC: 92 MG/DL — SIGNIFICANT CHANGE UP
CREAT SERPL-MCNC: 1.04 MG/DL — SIGNIFICANT CHANGE UP (ref 0.5–1.3)
EOSINOPHIL # BLD AUTO: 0.15 K/UL — SIGNIFICANT CHANGE UP (ref 0–0.5)
EOSINOPHIL NFR BLD AUTO: 1.3 % — SIGNIFICANT CHANGE UP (ref 0–6)
ESTIMATED AVERAGE GLUCOSE: 186 MG/DL — HIGH (ref 68–114)
GLUCOSE BLDC GLUCOMTR-MCNC: 134 MG/DL — HIGH (ref 70–99)
GLUCOSE BLDC GLUCOMTR-MCNC: 184 MG/DL — HIGH (ref 70–99)
GLUCOSE BLDC GLUCOMTR-MCNC: 227 MG/DL — HIGH (ref 70–99)
GLUCOSE BLDC GLUCOMTR-MCNC: 85 MG/DL — SIGNIFICANT CHANGE UP (ref 70–99)
GLUCOSE SERPL-MCNC: 114 MG/DL — HIGH (ref 70–99)
HCT VFR BLD CALC: 29.9 % — LOW (ref 34.5–45)
HGB BLD-MCNC: 10.6 G/DL — LOW (ref 11.5–15.5)
IMM GRANULOCYTES NFR BLD AUTO: 0.8 % — SIGNIFICANT CHANGE UP (ref 0–1.5)
INR BLD: 1.11 RATIO — SIGNIFICANT CHANGE UP (ref 0.88–1.16)
LIDOCAIN IGE QN: 1729 U/L — HIGH (ref 73–393)
LYMPHOCYTES # BLD AUTO: 1.55 K/UL — SIGNIFICANT CHANGE UP (ref 1–3.3)
LYMPHOCYTES # BLD AUTO: 12.9 % — LOW (ref 13–44)
MAGNESIUM SERPL-MCNC: 2.2 MG/DL — SIGNIFICANT CHANGE UP (ref 1.6–2.6)
MCHC RBC-ENTMCNC: 30.1 PG — SIGNIFICANT CHANGE UP (ref 27–34)
MCHC RBC-ENTMCNC: 35.5 GM/DL — SIGNIFICANT CHANGE UP (ref 32–36)
MCV RBC AUTO: 84.9 FL — SIGNIFICANT CHANGE UP (ref 80–100)
MONOCYTES # BLD AUTO: 0.53 K/UL — SIGNIFICANT CHANGE UP (ref 0–0.9)
MONOCYTES NFR BLD AUTO: 4.4 % — SIGNIFICANT CHANGE UP (ref 2–14)
NEUTROPHILS # BLD AUTO: 9.64 K/UL — HIGH (ref 1.8–7.4)
NEUTROPHILS NFR BLD AUTO: 80.3 % — HIGH (ref 43–77)
NRBC # BLD: 0 /100 WBCS — SIGNIFICANT CHANGE UP (ref 0–0)
PHOSPHATE SERPL-MCNC: 3.2 MG/DL — SIGNIFICANT CHANGE UP (ref 2.5–4.5)
PLATELET # BLD AUTO: 296 K/UL — SIGNIFICANT CHANGE UP (ref 150–400)
POTASSIUM SERPL-MCNC: 3.2 MMOL/L — LOW (ref 3.5–5.3)
POTASSIUM SERPL-SCNC: 3.2 MMOL/L — LOW (ref 3.5–5.3)
PROT SERPL-MCNC: 7.4 G/DL — SIGNIFICANT CHANGE UP (ref 6–8.3)
PROTHROM AB SERPL-ACNC: 13.1 SEC — SIGNIFICANT CHANGE UP (ref 10.6–13.6)
RBC # BLD: 3.52 M/UL — LOW (ref 3.8–5.2)
RBC # FLD: 17.7 % — HIGH (ref 10.3–14.5)
SODIUM SERPL-SCNC: 136 MMOL/L — SIGNIFICANT CHANGE UP (ref 135–145)
SODIUM UR-SCNC: 39 MMOL/L — SIGNIFICANT CHANGE UP
WBC # BLD: 11.99 K/UL — HIGH (ref 3.8–10.5)
WBC # FLD AUTO: 11.99 K/UL — HIGH (ref 3.8–10.5)

## 2021-09-10 PROCEDURE — 99233 SBSQ HOSP IP/OBS HIGH 50: CPT | Mod: GC

## 2021-09-10 PROCEDURE — 99232 SBSQ HOSP IP/OBS MODERATE 35: CPT

## 2021-09-10 PROCEDURE — 99223 1ST HOSP IP/OBS HIGH 75: CPT

## 2021-09-10 RX ORDER — CEFUROXIME AXETIL 250 MG
1 TABLET ORAL
Qty: 20 | Refills: 0
Start: 2021-09-10 | End: 2021-09-19

## 2021-09-10 RX ORDER — POTASSIUM CHLORIDE 20 MEQ
10 PACKET (EA) ORAL
Refills: 0 | Status: COMPLETED | OUTPATIENT
Start: 2021-09-10 | End: 2021-09-10

## 2021-09-10 RX ORDER — METRONIDAZOLE 500 MG
1 TABLET ORAL
Qty: 30 | Refills: 0
Start: 2021-09-10 | End: 2021-09-19

## 2021-09-10 RX ORDER — POTASSIUM CHLORIDE 20 MEQ
40 PACKET (EA) ORAL ONCE
Refills: 0 | Status: COMPLETED | OUTPATIENT
Start: 2021-09-10 | End: 2021-09-10

## 2021-09-10 RX ORDER — PANTOPRAZOLE SODIUM 20 MG/1
1 TABLET, DELAYED RELEASE ORAL
Qty: 30 | Refills: 0
Start: 2021-09-10 | End: 2021-10-09

## 2021-09-10 RX ORDER — POTASSIUM CHLORIDE 20 MEQ
10 PACKET (EA) ORAL ONCE
Refills: 0 | Status: DISCONTINUED | OUTPATIENT
Start: 2021-09-10 | End: 2021-09-10

## 2021-09-10 RX ADMIN — Medication 40 MILLIEQUIVALENT(S): at 14:47

## 2021-09-10 RX ADMIN — Medication 2: at 16:51

## 2021-09-10 RX ADMIN — Medication 1: at 12:37

## 2021-09-10 RX ADMIN — PANTOPRAZOLE SODIUM 40 MILLIGRAM(S): 20 TABLET, DELAYED RELEASE ORAL at 12:37

## 2021-09-10 RX ADMIN — PIPERACILLIN AND TAZOBACTAM 25 GRAM(S): 4; .5 INJECTION, POWDER, LYOPHILIZED, FOR SOLUTION INTRAVENOUS at 07:05

## 2021-09-10 RX ADMIN — HEPARIN SODIUM 5000 UNIT(S): 5000 INJECTION INTRAVENOUS; SUBCUTANEOUS at 07:05

## 2021-09-10 NOTE — DISCHARGE NOTE PROVIDER - CARE PROVIDER_API CALL
Aristides Douglass)  Surgery  [address] [place] [address]  Phone: [phone]  Fax: [phone]  Established Patient  Follow Up Time:     James Clemens)  Gastroenterology; Internal Medicine  [address] [place], [place] [address]  Phone: [phone]  Fax: [phone]  Established Patient  Follow Up Time:     Kristel Lucero  Phone: [phone]  Fax: (   )    -  Established Patient  Follow Up Time:

## 2021-09-10 NOTE — PROGRESS NOTE ADULT - PROBLEM SELECTOR PLAN 3
Patient has obstructive jaundice and gal stone pancreatitis- under management  trend  f/u ggt Hb 10.3 unknown baseline  f/u anemia panel  Monitor platelets to monitor for hemolysis

## 2021-09-10 NOTE — PROGRESS NOTE ADULT - PROBLEM SELECTOR PLAN 8
recent dementia on donepezil  given 1 dose seroquel 25 mg qd   needs frequent reorientation hold dvt prophylaxis for now as pt is for procedure  resume after procedure if ok with GI - lovenox 40 sq qd

## 2021-09-10 NOTE — PROGRESS NOTE ADULT - PROBLEM SELECTOR PLAN 6
on janumet and glimeperide, came in with sugars 400s. Likely d/t pancreatitis, and beta cell transient dysfunction.  FS ACHS  maintain fs 140-180  Add lantus 9 and sliding scale to determine nutritional  f/u a1c Pt w/ SCr 1.5 on admission  Baseline SCr unknown  F/U Urine Lytes, calculate FeNa  IVF for now, follow BMP daily

## 2021-09-10 NOTE — PROGRESS NOTE ADULT - ASSESSMENT
81F with pmhx of HTN, DM, diverticulitis s/p partial colonic resection presenting for epigastric pain, poor appetite, jaundice w/ biliary and pancreatic ductal dilation on CT s/p EUS yesterday with findings suspicious for type 3 choledochocele and ERCP w/ biliary sphincterotomy, removal of copious amounts of sludge, stones, pus consistent with cholangitis, ampullary biopsies, and placement of a fully covered metal stent. Doing well post procedure. LFTs downtrending.  - Follow-up pathology results.   - Advance diet to low fat today.  - PPI daily for 2 weeks.  - Avoid NSAIDs for 2 weeks.   - Will need outpatient GI follow-up at discharge for stent removal.

## 2021-09-10 NOTE — PROGRESS NOTE ADULT - SUBJECTIVE AND OBJECTIVE BOX
Interval:  Pt limited historian but reports feeling well today. No pain. Denies any new complaints. Tolerating clears well. Denies any melena, hematochezia, fever/chills, or other issues.    MEDICATIONS:  acetaminophen   Tablet .. 325 milliGRAM(s) Oral every 12 hours PRN  donepezil 10 milliGRAM(s) Oral at bedtime  heparin   Injectable 5000 Unit(s) SubCutaneous every 8 hours  insulin glargine Injectable (LANTUS) 9 Unit(s) SubCutaneous at bedtime  insulin lispro (ADMELOG) corrective regimen sliding scale   SubCutaneous three times a day before meals  insulin lispro (ADMELOG) corrective regimen sliding scale   SubCutaneous at bedtime  lactated ringers. 1000 milliLiter(s) IV Continuous <Continuous>  pantoprazole  Injectable 40 milliGRAM(s) IV Push daily  piperacillin/tazobactam IVPB.. 3.375 Gram(s) IV Intermittent every 8 hours  potassium chloride  10 mEq/100 mL IVPB 10 milliEquivalent(s) IV Intermittent every 1 hour  traMADol 25 milliGRAM(s) Oral every 8 hours PRN    ALLERGIES:  No Known Allergies    SOCIAL HISTORY:  Lives at home alone, ambulates independently  Denies smoking, alcohol intake, and illicit drug use (08 Sep 2021 18:06)      REVIEW OF SYSTEMS:  CONSTITUTIONAL: No weakness, fevers or chills.  EYES/ENT: No visual changes;  No vertigo or throat pain.  NECK: No pain or stiffness.  RESPIRATORY: No cough, wheezing, hemoptysis; No shortness of breath.  CARDIOVASCULAR: No chest pain or palpitations.  GASTROINTESTINAL: As per HPI.   GENITOURINARY: No dysuria, frequency or hematuria.  NEUROLOGICAL: No numbness or weakness.  SKIN: No itching, rashes.      PHYSICAL EXAM:  VITAL SIGNS:  T(C): 36.4 (09-10-21 @ 05:18), Max: 36.5 (21 @ 20:34)  HR: 62 (09-10-21 @ 05:18) (53 - 62)  BP: 126/71 (09-10-21 @ 05:18) (120/69 - 131/55)  RR: 16 (09-10-21 @ 05:18) (16 - 18)  SpO2: 98% (09-10-21 @ 05:18) (97% - 98%)  I/Os:     GENERAL: NAD, lying in bed comfortably.  HEAD:  Atraumatic, Normocephalic.  EYES: EOMI, normal conjunctiva, no scleral icterus.  ENT: Moist mucous membranes.  NECK: Supple.  CHEST/LUNG: Clear to auscultation bilaterally; No rales, rhonchi, wheezing, or rubs. Unlabored respirations.  HEART: Regular rate and rhythm; No murmurs, rubs, or gallops.  ABDOMEN: BSx4; Soft, nontender, nondistended.  EXTREMITIES:  No edema.  NERVOUS SYSTEM:  A&Ox3, no obvious focal deficits.  SKIN: No rashes or lesions.      LABS:                         10.6   11.99 )-----------( 296      ( 10 Sep 2021 06:03 )             29.9     09-10    136  |  97  |  16  ----------------------------<  114<H>  3.2<L>   |  30  |  1.04    Ca    8.6      10 Sep 2021 06:03  Phos  3.2     09-10  Mg     2.2     09-10    TPro  7.4  /  Alb  2.5<L>  /  TBili  4.6<H>  /  DBili  3.0<H>  /  AST  104<H>  /  ALT  165<H>  /  AlkPhos  587<H>  09-10    PT/INR - ( 10 Sep 2021 06:03 )   PT: 13.1 sec;   INR: 1.11 ratio         PTT - ( 08 Sep 2021 15:44 )  PTT:31.5 sec  Urinalysis Basic - ( 08 Sep 2021 14:55 )    Color: Sarah / Appearance: Clear / S.010 / pH: x  Gluc: x / Ketone: Negative  / Bili: Large / Urobili: 8   Blood: x / Protein: 30 mg/dL / Nitrite: Negative   Leuk Esterase: Trace / RBC: 2-5 /HPF / WBC 6-10 /HPF   Sq Epi: x / Non Sq Epi: Many /HPF / Bacteria: Moderate /HPF        RADIOLOGY & ADDITIONAL TESTS: Reviewed.

## 2021-09-10 NOTE — CONSULT NOTE ADULT - ASSESSMENT
81 yoF with cholelithiasis and choledocholithiasis s/p ERCP with CBD stent 9/9/21    ERCP with CBD dilatation, and also finding concerning for choledochocele, ampulla sample sent for bx  GB with gallstones and distention  T bili downtrended from 11 to 4.6  lipase trending down      Pt to have family discussion for OR, recommended earlier intervention  If pt agreeable, please notify surgery team and obtain medical clearance/optimization  Mng per primary team, will continue to follow  d/w Dr. Douglass

## 2021-09-10 NOTE — DISCHARGE NOTE PROVIDER - NSDCCAREPROVSEEN_GEN_ALL_CORE_FT
Jayleen, James Hager, Kevin Florez, Joe Schmidt, Tesha Sellers, Bell Jayleen, James Hager, Kevin Florez, Joe Schmidt, Tesha Sellers, Bell Douglass, Aristides S

## 2021-09-10 NOTE — PROGRESS NOTE ADULT - ASSESSMENT
81F, ambulates independently, lives alone at home with HHA [id number] (on 2nd floor, daughter Jodee  lives on 1st floor, daughter Delfina  lives 2 houses down) with PMH HTN, DM, PSH Diverticulitis needing exlap, p/w progressive jaundice x 1 week admitted for obstructive jaundice

## 2021-09-10 NOTE — PROGRESS NOTE ADULT - SUBJECTIVE AND OBJECTIVE BOX
PGY-1 Progress Note discussed with attending    PAGER #: [--------] TILL 5:00 PM  PLEASE CONTACT ON CALL TEAM:  - On Call Team (Please refer to Vicky) FROM 5:00 PM - 8:30PM  - Nightfloat Team FROM 8:30 -7:30 AM    CHIEF COMPLAINT & BRIEF HOSPITAL COURSE:  81F, ambulates independently, lives alone at home with [facility] [id number] (on 2nd floor, daughter Jodee  lives on 1st floor, daughter  (HPI historian)  Delfina  lives 2 houses down) with PMH HTN, DM, PSH Diverticulitis needing exlap.    She p/w progressive jaundice x 1 week worsened over 2 days prior to admission. She was overtly yellow on the day of admission which prompted ED visit.     Pt has been complaining about intermittent epigastric postprandial pain. SX are associated with chills, tactile fevers, pruritus, elevated home FS sugars and decreased PO intake and subsequent 8lb weight loss in the last 2 weeks      Pt is recently diagnosed with dementia x1yr since getting COVID in 2020. As per family, patient has outbursts of anger and has poor short term memory during the interview.    In the ED: AFVSS, On  Exam: epigastric tenderness and jaundice on face.  Labs: WCBC 15, Hgb 10.3, Na 131, Cr 1.56, glucose 400s Bili 10.4, Alk phos 640 ALST 250  , lipase 58523m  Urine contaminated  Hepatitis panel non reactive  CT Abd- dilated CBD, Dilated pancreatic duct, stone in ampulla and small stones in the galbladder.    She was admitted for obstructive jaundice and work up of choledocholithiasis vs cholangitis, and for subsequent gal stone pancreatitis.    INTERVAL HPI/OVERNIGHT EVENTS:   Had been altered and combative in her sleep  Otherwise does not remember  Pleasant lady otherwise    MEDICATIONS  (STANDING):  donepezil 10 milliGRAM(s) Oral at bedtime  heparin   Injectable 5000 Unit(s) SubCutaneous every 8 hours  insulin glargine Injectable (LANTUS) 9 Unit(s) SubCutaneous at bedtime  insulin lispro (ADMELOG) corrective regimen sliding scale   SubCutaneous three times a day before meals  insulin lispro (ADMELOG) corrective regimen sliding scale   SubCutaneous at bedtime  lactated ringers. 1000 milliLiter(s) (150 mL/Hr) IV Continuous <Continuous>  pantoprazole  Injectable 40 milliGRAM(s) IV Push daily  piperacillin/tazobactam IVPB.. 3.375 Gram(s) IV Intermittent every 8 hours    MEDICATIONS  (PRN):  acetaminophen   Tablet .. 325 milliGRAM(s) Oral every 12 hours PRN Temp greater or equal to 38C (100.4F), Mild Pain (1 - 3)  traMADol 25 milliGRAM(s) Oral every 8 hours PRN Severe Pain (7 - 10)      REVIEW OF SYSTEMS:  CONSTITUTIONAL: No fever, weight loss, or fatigue  RESPIRATORY: No cough, wheezing, chills or hemoptysis; No shortness of breath  CARDIOVASCULAR: No chest pain, palpitations, dizziness, or leg swelling  GASTROINTESTINAL: No abdominal pain. No nausea, vomiting, or hematemesis; No diarrhea or constipation. No melena or hematochezia.  GENITOURINARY: No dysuria or hematuria, urinary frequency  NEUROLOGICAL: No headaches, memory loss, loss of strength, numbness, or tremors  SKIN: No itching, burning, rashes, or lesions     Vital Signs Last 24 Hrs  T(C): 36.4 (10 Sep 2021 05:18), Max: 36.5 (09 Sep 2021 20:34)  T(F): 97.6 (10 Sep 2021 05:18), Max: 97.7 (09 Sep 2021 20:34)  HR: 62 (10 Sep 2021 05:18) (53 - 62)  BP: 126/71 (10 Sep 2021 05:18) (120/69 - 131/55)  BP(mean): --  RR: 16 (10 Sep 2021 05:18) (16 - 18)  SpO2: 98% (10 Sep 2021 05:18) (97% - 98%)    PHYSICAL EXAMINATION:  GENERAL: NAD, well built  HEAD:  Atraumatic, Normocephalic  EYES:  conjunctiva and sclera clear  NECK: Supple, No JVD, Normal thyroid  CHEST/LUNG: Clear to auscultation. Clear to percussion bilaterally; No rales, rhonchi, wheezing, or rubs  HEART: Regular rate and rhythm; No murmurs, rubs, or gallops  ABDOMEN: Soft, Nontender, Nondistended; Bowel sounds present  NERVOUS SYSTEM:  Alert & Oriented X3,    EXTREMITIES:  2+ Peripheral Pulses, No clubbing, cyanosis, or edema  SKIN: warm dry                          10.1   13.47 )-----------( 254      ( 09 Sep 2021 15:16 )             28.5     09-09    131<L>  |  95<L>  |  14  ----------------------------<  248<H>  3.1<L>   |  28  |  1.25    Ca    8.2<L>      09 Sep 2021 15:16  Phos  3.3     09-09  Mg     1.4     09-09    TPro  7.0  /  Alb  2.4<L>  /  TBili  11.0<H>  /  DBili  8.9<H>  /  AST  195<H>  /  ALT  197<H>  /  AlkPhos  636<H>  09-09    LIVER FUNCTIONS - ( 09 Sep 2021 15:16 )  Alb: 2.4 g/dL / Pro: 7.0 g/dL / ALK PHOS: 636 U/L / ALT: 197 U/L DA / AST: 195 U/L / GGT: x           CARDIAC MARKERS ( 08 Sep 2021 11:16 )  <0.015 ng/mL / x     / x     / x     / x          PT/INR - ( 08 Sep 2021 15:44 )   PT: 13.6 sec;   INR: 1.15 ratio         PTT - ( 08 Sep 2021 15:44 )  PTT:31.5 sec    CAPILLARY BLOOD GLUCOSE      RADIOLOGY & ADDITIONAL TESTS:                   PGY-1 Progress Note discussed with attending    PAGER #: [[phone]] TILL 5:00 PM  PLEASE CONTACT ON CALL TEAM:  - On Call Team (Please refer to Vicky) FROM 5:00 PM - 8:30PM  - Nightfloat Team FROM 8:30 -7:30 AM    CHIEF COMPLAINT & BRIEF HOSPITAL COURSE:  81F, ambulates independently, lives alone at home with [facility] [id number] (on 2nd floor, daughter Jodee  lives on 1st floor, daughter  (HPI historian)  Delfina  lives 2 houses down) with PMH HTN, DM, PSH Diverticulitis needing exlap.    She p/w progressive jaundice x 1 week worsened over 2 days prior to admission. She was overtly yellow on the day of admission which prompted ED visit.     Pt has been complaining about intermittent epigastric postprandial pain. SX are associated with chills, tactile fevers, pruritus, elevated home FS sugars and decreased PO intake and subsequent 8lb weight loss in the last 2 weeks      Pt is recently diagnosed with dementia x1yr since getting COVID in 2020. As per family, patient has outbursts of anger and has poor short term memory during the interview.    In the ED: AFVSS, On  Exam: epigastric tenderness and jaundice on face.  Labs: WCBC 15, Hgb 10.3, Na 131, Cr 1.56, glucose 400s Bili 10.4, Alk phos 640 ALST 250  , lipase 25216p  Urine contaminated  Hepatitis panel non reactive  CT Abd- dilated CBD, Dilated pancreatic duct, stone in ampulla and small stones in the galbladder.    She was admitted for obstructive jaundice and work up of choledocholithiasis vs cholangitis, and for subsequent gal stone pancreatitis.    INTERVAL HPI/OVERNIGHT EVENTS:   Had been altered and combative in her sleep  Otherwise does not remember yesterday  Does not recall ERCP   Reports 2/10 sharp intermittent RUQ pain  Pleasant lady otherwise  No acute overnight events    MEDICATIONS  (STANDING):  donepezil 10 milliGRAM(s) Oral at bedtime  heparin   Injectable 5000 Unit(s) SubCutaneous every 8 hours  insulin glargine Injectable (LANTUS) 9 Unit(s) SubCutaneous at bedtime  insulin lispro (ADMELOG) corrective regimen sliding scale   SubCutaneous three times a day before meals  insulin lispro (ADMELOG) corrective regimen sliding scale   SubCutaneous at bedtime  lactated ringers. 1000 milliLiter(s) (150 mL/Hr) IV Continuous <Continuous>  pantoprazole  Injectable 40 milliGRAM(s) IV Push daily  piperacillin/tazobactam IVPB.. 3.375 Gram(s) IV Intermittent every 8 hours    MEDICATIONS  (PRN):  acetaminophen   Tablet .. 325 milliGRAM(s) Oral every 12 hours PRN Temp greater or equal to 38C (100.4F), Mild Pain (1 - 3)  traMADol 25 milliGRAM(s) Oral every 8 hours PRN Severe Pain (7 - 10)      REVIEW OF SYSTEMS:  CONSTITUTIONAL: No fever, weight loss, or fatigue  RESPIRATORY: No cough, wheezing, chills or hemoptysis; No shortness of breath  CARDIOVASCULAR: No chest pain, palpitations, dizziness, or leg swelling  GASTROINTESTINAL: Admits to abdominal pain. No nausea, vomiting, or hematemesis; No diarrhea or constipation. No melena or hematochezia.  GENITOURINARY: No dysuria or hematuria, urinary frequency  NEUROLOGICAL: No headaches, memory loss, loss of strength, numbness, or tremors  SKIN: No itching, burning, rashes, or lesions     Vital Signs Last 24 Hrs  T(C): 36.4 (10 Sep 2021 05:18), Max: 36.5 (09 Sep 2021 20:34)  T(F): 97.6 (10 Sep 2021 05:18), Max: 97.7 (09 Sep 2021 20:34)  HR: 62 (10 Sep 2021 05:18) (53 - 62)  BP: 126/71 (10 Sep 2021 05:18) (120/69 - 131/55)  BP(mean): --  RR: 16 (10 Sep 2021 05:18) (16 - 18)  SpO2: 98% (10 Sep 2021 05:18) (97% - 98%)    PHYSICAL EXAMINATION:  GENERAL: NAD, well built  HEAD:  Atraumatic, Normocephalic  EYES: mild scleral icterus, no conjunctival erythema  NECK: Supple, No JVD, Normal thyroid  CHEST/LUNG: Clear to auscultation. Clear to percussion bilaterally; No rales, rhonchi, wheezing, or rubs  HEART: Regular rate and rhythm; No murmurs, rubs, or gallops  ABDOMEN: RUQ tender to deep palpation, soft abdomen, nondistended; Bowel sounds present  NERVOUS SYSTEM:  Alert & Oriented X1  EXTREMITIES:  2+ Peripheral Pulses, No clubbing, cyanosis, or edema  SKIN: warm dry                          10.1   13.47 )-----------( 254      ( 09 Sep 2021 15:16 )             28.5     09-09    131<L>  |  95<L>  |  14  ----------------------------<  248<H>  3.1<L>   |  28  |  1.25    Ca    8.2<L>      09 Sep 2021 15:16  Phos  3.3     09-09  Mg     1.4     09-09    TPro  7.0  /  Alb  2.4<L>  /  TBili  11.0<H>  /  DBili  8.9<H>  /  AST  195<H>  /  ALT  197<H>  /  AlkPhos  636<H>  09-09    LIVER FUNCTIONS - ( 09 Sep 2021 15:16 )  Alb: 2.4 g/dL / Pro: 7.0 g/dL / ALK PHOS: 636 U/L / ALT: 197 U/L DA / AST: 195 U/L / GGT: x           CARDIAC MARKERS ( 08 Sep 2021 11:16 )  <0.015 ng/mL / x     / x     / x     / x          PT/INR - ( 08 Sep 2021 15:44 )   PT: 13.6 sec;   INR: 1.15 ratio         PTT - ( 08 Sep 2021 15:44 )  PTT:31.5 sec    CAPILLARY BLOOD GLUCOSE      RADIOLOGY & ADDITIONAL TESTS:

## 2021-09-10 NOTE — PROGRESS NOTE ADULT - ATTENDING COMMENTS
Patient says she feels better, tolerating CLD diet well without any pain. Dbili downtrending 10 -> 4. Surgery consulted today for cholestectomy. Discussed with daughter today about the necessity of cholestectomy, family is agreeable. Also discussed inpatient vs outpatient procedure.. Daughter is currently leaning towards elective cholestectomy, but will discuss with other family members first and let us know by tomorrow.

## 2021-09-10 NOTE — CONSULT NOTE ADULT - ATTENDING COMMENTS
Choledocholithiasis with cholangitis and pancreatitis - s/p ERCP and stent. Patient is doing well.     Plan:  Laparoscopic cholecystectomy during this admission  Medical ?Cardiac preop evaluation   Trend LFTs  Will wait for biopsy results before proceeding with surgery
Date of service 9/8/2021. Patient seen and examined. In short, pt is an 81F presenting with abd pain, jaundice progressive over the last week along with poor appetite. Pain now improved, abd exam mildly tender in epigastrium. CT showing significant biliary and also pancreatic ductal dilation with lesion seen in ampullary region - suspect possible obstructing stone given pain however other ddx includes small pancreatic head or ampullary tumor. Plan for EUS with possible FNB if mass found with ERCP for biliary decompression. Risks, benefits, and alternatives of the procedures were discussed at length with the patient and patient's daughter Delfina including but not limited to pancreatitis (5-10% risk), bleeding, perforation, aspiration, infection, anesthesia related complication, etc. Also discussed the administration of an indomethacin suppository during the procedure in order to help prevent pancreatitis which the patient agreed to. They acknowledged the risks of the procedure and wished to proceed. Continue LR in the mean time, NPO after midnight.

## 2021-09-10 NOTE — PHYSICAL THERAPY INITIAL EVALUATION ADULT - PERTINENT HX OF CURRENT PROBLEM, REHAB EVAL
admitted due to jaundice; reported to have mild dementia since 1 year as noted on this admission's H&P

## 2021-09-10 NOTE — DISCHARGE NOTE PROVIDER - PROVIDER TOKENS
PROVIDER:[TOKEN:[[id number]],ESTABLISHEDPATIENT:[T]],PROVIDER:[TOKEN:[[id number]],ESTABLISHEDPATIENT:[T]],FREE:[LAST:[Afaf],FIRST:[Kristel],PHONE:[phone]],FAX:[(   )    -],ESTABLISHEDPATIENT:[T]]

## 2021-09-10 NOTE — PROGRESS NOTE ADULT - PROBLEM SELECTOR PLAN 2
Mid epigastric pain, jaundice, tenderness on exam. Lipase 12K CT shows  possible obstructive stone in the ampulla  Sx consistent with gallstone pancreatitis, lipase is now 4000s from 62320  -Clear liquids ok'd by GI  -trend post ERCP lipase and monitor for pain  -IVF LR 150cc/hr for now  -Tramadol 25 q8 + Tylenol 325 q12 prn for pain control for now  -s/p 1 L bolus in ED  -Consider Dilaudid Mid epigastric pain, jaundice, tenderness on exam. Lipase 12K CT shows  possible obstructive stone in the ampulla  Sx consistent with gallstone pancreatitis, lipase is now 4000s from 91867  -Clear liquids ok'd by GI  -trend post ERCP lipase and monitor for pain  -IVF LR 150cc/hr for now  -Tramadol 25 q8 + Tylenol 325 q12 prn for pain control for now  -s/p 1 L bolus in ED

## 2021-09-10 NOTE — CONSULT NOTE ADULT - SUBJECTIVE AND OBJECTIVE BOX
HPI:  81F, ambulates independently, lives alone at home with [facility] [id number] (on 2nd floor, daughter Jodee  lives on 1st floor, daughter Delfina  lives 2 houses down) with PMH HTN, DM, PSH Diverticulitis needing exlap, p/w progressive jaundice x 1 week. As per daughter Delfina at bedside, pt was noted to be jaundiced about 2 days prior to admission. She was overtly yellow on the day of admission which prompted ED visit. Pt has been complaining about intermittent epigastric associated meals. Pain currently is similar now. Patient has been having pain x 1 week and has not been eating to avoid experiencing pain. Pt with chills the night prior to admission, but temperature was not taken. Pt has lost 8lb in the last 2 weeks due to abdominal pain and food aversion. Pt with pruritus 2 days prior to admission and increasing sugars 270s vs 140s 1 week prior.    Pt is recently diagnosed with dementia x1yr since getting COVID in . As per family, patient has outbursts of anger and has poor short term memory during the interview. (08 Sep 2021 18:06)    Interval HPI:  W/u revealed choledocholithiasis s/p ERCP with CBD stent 21. Surgery consulted for plans for cholecystectomy.   Pt appears frustrated for not being told that she will require surgery. Denies any acute complaints, n/v/f/c. Pt prefers family discussion in prior to making decision on surgery. Benefits for earlier intervention discussed, pt verbalizes understanding.         REVIEW OF SYSTEMS:  Negative except stated above in HPI    PAST MEDICAL & SURGICAL HISTORY:  DM (diabetes mellitus)    HTN (hypertension)    Reflux    HLD (hyperlipidemia)    Neuropathy of lower extremity    RA (rheumatoid arthritis)    Deep vein thrombosis (DVT), right    Knee pain, chronic, right    S/P eye surgery, follow-up exam  right eye    S/P colon resection    S/P appendectomy    Total knee replacement status  left    S/P partial hysterectomy        MEDICATIONS  (STANDING):  donepezil 10 milliGRAM(s) Oral at bedtime  heparin   Injectable 5000 Unit(s) SubCutaneous every 8 hours  insulin glargine Injectable (LANTUS) 9 Unit(s) SubCutaneous at bedtime  insulin lispro (ADMELOG) corrective regimen sliding scale   SubCutaneous three times a day before meals  insulin lispro (ADMELOG) corrective regimen sliding scale   SubCutaneous at bedtime  lactated ringers. 1000 milliLiter(s) (150 mL/Hr) IV Continuous <Continuous>  pantoprazole  Injectable 40 milliGRAM(s) IV Push daily  piperacillin/tazobactam IVPB.. 3.375 Gram(s) IV Intermittent every 8 hours  potassium chloride    Tablet ER 40 milliEquivalent(s) Oral once  potassium chloride  10 mEq/100 mL IVPB 10 milliEquivalent(s) IV Intermittent every 1 hour    MEDICATIONS  (PRN):  acetaminophen   Tablet .. 325 milliGRAM(s) Oral every 12 hours PRN Temp greater or equal to 38C (100.4F), Mild Pain (1 - 3)  traMADol 25 milliGRAM(s) Oral every 8 hours PRN Severe Pain (7 - 10)      Allergies    No Known Allergies    Intolerances        Vital Signs Last 24 Hrs  T(C): 36.4 (10 Sep 2021 05:18), Max: 36.5 (09 Sep 2021 20:34)  T(F): 97.6 (10 Sep 2021 05:18), Max: 97.7 (09 Sep 2021 20:34)  HR: 62 (10 Sep 2021 05:18) (53 - 62)  BP: 126/71 (10 Sep 2021 05:18) (120/69 - 131/55)  BP(mean): --  RR: 16 (10 Sep 2021 05:18) (16 - 18)  SpO2: 98% (10 Sep 2021 05:18) (97% - 98%)    PHYSCAL EXAM:   General: Alert and oriented, not in acute distress  Resp: Breathing unlabored  GI:  : No Pierce, no dysuria or hematuria  Extremities: No pedal edema      I&O's Detail      LABS:                        10.6   11.99 )-----------( 296      ( 10 Sep 2021 06:03 )             29.9              09-10    136  |  97  |  16  ----------------------------<  114<H>  3.2<L>   |  30  |  1.04    Ca    8.6      10 Sep 2021 06:03  Phos  3.2     09-10  Mg     2.2     09-10    TPro  7.4  /  Alb  2.5<L>  /  TBili  4.6<H>  /  DBili  3.0<H>  /  AST  104<H>  /  ALT  165<H>  /  AlkPhos  587<H>  09-10            PT/INR - ( 10 Sep 2021 06:03 )   PT: 13.1 sec;   INR: 1.11 ratio         PTT - ( 08 Sep 2021 15:44 )  PTT:31.5 sec  Urinalysis Basic - ( 08 Sep 2021 14:55 )    Color: Sarah / Appearance: Clear / S.010 / pH: x  Gluc: x / Ketone: Negative  / Bili: Large / Urobili: 8   Blood: x / Protein: 30 mg/dL / Nitrite: Negative   Leuk Esterase: Trace / RBC: 2-5 /HPF / WBC 6-10 /HPF   Sq Epi: x / Non Sq Epi: Many /HPF / Bacteria: Moderate /HPF        RADIOLOGY & ADDITIONAL STUDIES:  < from: CT Abdomen and Pelvis w/ IV Cont (21 @ 14:35) >  FINDINGS:  LOWER CHEST: Small bilateral atelectasis.    LIVER: Hepatic steatosis.  BILE DUCTS: Dilated intra and extrahepatic biliary ducts. The common bile duct measures up to 1.5 cm in diameter which is dilated. Apparent 1.4 cm stone or mass in the region of the ampulla(image 72 series 2).  GALLBLADDER: Small gallstones in the distended gallbladder.  SPLEEN: Punctate calcifications are again seen in the spleen, likely due to prior granulomatous disease.  PANCREAS: Atrophic pancreas. Mildly dilated main pancreatic duct measuring 4 mm in diameter.  ADRENALS: The right adrenal appears unremarkable. Nonspecific mild left adrenal thickening.  KIDNEYS/URETERS: Small cyst in the upper pole of the right kidney. Tiny hypodense lesion in the lower pole of the right kidney, too small to characterize. The left kidney appears unremarkable.    BLADDER: Within normal limits.  REPRODUCTIVE ORGANS: The uterus is not visualized, likely surgically absent.    BOWEL: No bowel obstruction. Appendix not visualized. No secondary signs for acute appendicitis. Colonic diverticulosis without evidence for diverticulitis.  PERITONEUM: No free air or ascites.  VESSELS: Calcified atherosclerotic disease.  RETROPERITONEUM/LYMPH NODES: No lymphadenopathy.  ABDOMINAL WALL: Within normallimits.  BONES: Degenerative spondylosis. Grade 1 anterolisthesis at L4-5.    IMPRESSION:  Dilated intra and extrahepatic biliary ducts. The common bile duct measures up to 1.5 cm in diameter which is dilated. The main pancreatic duct is mildly dilated as well. Apparent 1.4 cm stone or mass in the region of the ampulla. If clinically indicated, ERCP/endoscopic ultrasound may be pursued for further evaluation.    < end of copied text >        ERCP:     ASSESSMENT:  Dilated biliary tree with extremely prominent ampulla, appearance suspicious for type 3 choledochocele but ampulla biopsied to rule out malignancy.   Extensive amount of sludge, stones, and pus in the bile duct. Ductal clearance achieved by biliary sphincterotomy and balloon extraction.  Given purulence and mild post-sphincterotomy bleeding encountered, one 10 mm by 4 cm fully covered metal biliary stent (WallFlex) placed.   Excellent drainage of bile and contrast at conclusion.   No bleeding encountered at conclusion.

## 2021-09-10 NOTE — DISCHARGE NOTE PROVIDER - NSDCMRMEDTOKEN_GEN_ALL_CORE_FT
aspirin 81 mg oral delayed release tablet: 1 tab(s) orally once a day  cefuroxime 500 mg oral tablet: 1 tab(s) orally 2 times a day   Cleocin HCl 300 mg oral capsule: 1 cap(s) orally 3 times a day   donepezil 10 mg oral tablet: 1 tab(s) orally once a day (at bedtime)  glimepiride 2 mg oral tablet: 1 tab(s) orally 2 times a day  Janumet 50 mg-1000 mg oral tablet: 1 tab(s) orally 2 times a day  losartan-hydrochlorothiazide 50mg-12.5mg oral tablet: 1 tab(s) orally once a day  metroNIDAZOLE 500 mg oral tablet: 1 tab(s) orally 3 times a day   NIFEdipine 30 mg oral tablet, extended release: 1 tab(s) orally once a day  pantoprazole 40 mg oral delayed release tablet: 1 tab(s) orally once a day   aspirin 81 mg oral delayed release tablet: 1 tab(s) orally once a day  cefuroxime 500 mg oral tablet: 1 tab(s) orally 2 times a day   donepezil 10 mg oral tablet: 1 tab(s) orally once a day (at bedtime)  glimepiride 2 mg oral tablet: 1 tab(s) orally 2 times a day  Janumet 50 mg-1000 mg oral tablet: 1 tab(s) orally 2 times a day  losartan-hydrochlorothiazide 50mg-12.5mg oral tablet: 1 tab(s) orally once a day  metroNIDAZOLE 500 mg oral tablet: 1 tab(s) orally 3 times a day   NIFEdipine 30 mg oral tablet, extended release: 1 tab(s) orally once a day  pantoprazole 40 mg oral delayed release tablet: 1 tab(s) orally once a day

## 2021-09-10 NOTE — PROGRESS NOTE ADULT - PROBLEM SELECTOR PLAN 5
on home losartaqn hctz nifedipine  hold all htn meds for now  monitor bp  resume as needed on janumet and glimeperide, came in with sugars 400s. Likely d/t pancreatitis, and beta cell transient dysfunction.  FS ACHS  maintain fs 140-180  On lantus 9 and sliding scale to determine nutritional  f/u a1c

## 2021-09-10 NOTE — PROGRESS NOTE ADULT - PROBLEM SELECTOR PLAN 7
Pt w/ SCr 1.5 on admission  Baseline SCr unknown  F/U Urine Lytes, calculate FeNa  IVF for now, follow BMP daily recent dementia on donepezil  given 1 dose seroquel 25 mg qd   needs frequent reorientation

## 2021-09-10 NOTE — DISCHARGE NOTE PROVIDER - NSDCCPCAREPLAN_GEN_ALL_CORE_FT
PRINCIPAL DISCHARGE DIAGNOSIS  Diagnosis: Acute cholangitis due to calculus of bile duct with obstruction  Assessment and Plan of Treatment: You came in due to to yellowing of the skin of a fewl days and were admitted for obstructive jaundice   On admission, your Bilirubin was 10.4, INR 1.15  YOur Liver function showed obstructive pattern within your common biled duct- likely choledocholithiasis, cholangitis   CT AP Shows: "Dilated intra and extrahepatic biliary ducts. The common bile duct measures up to 1.5 cm in diameter which is dilated. The main pancreatic duct is mildly dilated as well. Apparent 1.4 cm stone or mass in the region of the ampulla." Dr. Jayleen FOOTE was consulted, and you underwent  ERCP with stent placement and EUS yesterday, stones, pus, and sludge removed, with excellent drainage. They also found possible choledococele found in the opening of to your duodenum,biopsy was taken to r/o malignancy.  Since the procedure your stent is in place and your drainage is good. You are doing well this AM. No new complaints. No fever, WBC downtrending 11.9 this AM, no tachycardia  You are completing a course of borad spectrum Antibiotics called Zosyn and will be transitioned to PO ceftin and flagyl.  - ID consulted Dr Basilio agree to c/w Zosyn until the transition  -We have consulted surgery for an elective gal bladder removal.      SECONDARY DISCHARGE DIAGNOSES  Diagnosis: Pancreatitis  Assessment and Plan of Treatment: You came in with jaundnce associated with Mid epigastric pain, jaundice, tenderness on exam. Lipase 12K CT shows  possible obstructive stone in the ampulla  Sx consistent with gallstone pancreatitis, lipase has now down trended from 12K to 4ks to 1000s  -Clear liquids ok'd by GI after surgery was advaced early, as tolerated  -Lipase was further trended  -hydration maintained  -Tramadol 25 q8 + Tylenol 325 q12 prn for pain control for now  -Youre pain is controlled and stone drainage from commnon bile duct is good, no concerns for obstruction, back flow and further injury to the pancrease      Diagnosis: Dementia  Assessment and Plan of Treatment: You have a hx of dementia on donepezil  given 1 dose seroquel 25 mg qd   needs frequent reorientation.  YOur presenting condition of cholangitis can precipitate altered mental status that led to combative behavior.  You will feel progressively better while  adequate drainage of the gal bladder is taking place.

## 2021-09-10 NOTE — PROGRESS NOTE ADULT - PROBLEM SELECTOR PLAN 4
Hb 10.3 unknown baseline  f/u anemia panel  Monitor platelets to monitor for hemolysis on home losartaqn hctz nifedipine  hold all htn meds for now  monitor bp  resume as needed

## 2021-09-10 NOTE — DISCHARGE NOTE PROVIDER - HOSPITAL COURSE
81F, ambulates independently, lives alone at home with HHA [id number] (on 2nd floor, daughter Jodee  lives on 1st floor, daughter  (HPI historian)  Delfina  lives 2 houses down) with PMH HTN, DM, PSH Diverticulitis needing exlap.    She p/w progressive jaundice x 1 week worsened over 2 days prior to admission. She was overtly yellow on the day of admission which prompted ED visit.     Pt has been complaining about intermittent epigastric postprandial pain. SX are associated with chills, tactile fevers, pruritus, elevated home FS sugars and decreased PO intake and subsequent 8lb weight loss in the last 2 weeks      Pt is recently diagnosed with dementia x1yr since getting COVID in 2020. As per family, patient has outbursts of anger and has poor short term memory during the interview.    In the ED: AFVSS, On  Exam: epigastric tenderness and jaundice on face.  Labs: WCBC 15, Hgb 10.3, Na 131, Cr 1.56, glucose 400s Bili 10.4, Alk phos 640 ALST 250  , lipase [id number]  Urine contaminated  Hepatitis panel non reactive  CT Abd- dilated CBD, Dilated pancreatic duct, stone in ampulla and small stones in the galbladder.    She was admitted for obstructive jaundice and work up of choledocholithiasis vs cholangitis, and for subsequent gal stone pancreatitis.    She underwent ERCP with stent placement, had excellent drainage of stone, sludge and pus, from the common bile duct, as well as a biopsy of a suspicious mass that looked like a choledococele. Bx results pending.  Since admission, patient has been altered and combative in her sleep, and briefly during the day.  Otherwise does not remember events.  Does not recall ERCP   AAO x1  Her mental status changes may be accentuated by the presence of cholangitis. Otherwise episodes are transient and self resolving. Pleasant lady otherwise.    No acute overnight events  Patient is stable, and ambulating, she is on clear liquid diet, and tolerating well. She has been advanced to full liquids prior to discharge.   She is in minimal epigastric pain, with mild tenderness on exam. Lipases has down trended from 11232z to 1000s, and bilirubin trended down    She is to be discharged to follow up with outpatient surgery for the cholecystectomy and for stent removal. 81F, ambulates independently, lives alone at home with HHA [id number] (on 2nd floor, daughter Jodee  lives on 1st floor, daughter  (HPI historian)  Delfina  lives 2 houses down) with PMH HTN, DM, PSH Diverticulitis needing exlap.    She p/w progressive jaundice x 1 week worsened over 2 days prior to admission. She was overtly yellow on the day of admission which prompted ED visit.     Pt has been complaining about intermittent epigastric postprandial pain. SX are associated with chills, tactile fevers, pruritus, elevated home FS sugars and decreased PO intake and subsequent 8lb weight loss in the last 2 weeks      Pt is recently diagnosed with dementia x1yr since getting COVID in 2020. As per family, patient has outbursts of anger and has poor short term memory during the interview.    In the ED: AFVSS, On  Exam: epigastric tenderness and jaundice on face.  Labs: WCBC 15, Hgb 10.3, Na 131, Cr 1.56, glucose 400s Bili 10.4, Alk phos 640 ALST 250  , lipase [id number]  Urine contaminated  Hepatitis panel non reactive  CT Abd- dilated CBD, Dilated pancreatic duct, stone in ampulla and small stones in the galbladder.    She was admitted for obstructive jaundice and work up of choledocholithiasis vs cholangitis, and for subsequent gal stone pancreatitis.    She underwent ERCP with stent placement, had excellent drainage of stone, sludge and pus, from the common bile duct, as well as a biopsy of a suspicious mass that looked like a choledococele. Bx results pending.  Since admission, patient has been altered and combative in her sleep, and briefly during the day.  Otherwise does not remember events.  Does not recall ERCP   AAO x1  Her mental status changes may be accentuated by the presence of cholangitis. Otherwise episodes are transient and self resolving. Pleasant lady otherwise.    No acute overnight events  Patient is stable, and ambulating, she is on clear liquid diet, and tolerating well. She has been advanced to full liquids prior to discharge.   She is in minimal epigastric pain, with mild tenderness on exam. Lipases has down trended from 93833a to 1000s, and bilirubin trended down    Patient underwent laparoscopic cholecystectomy on 9/15. Postoperatively pain controlled, tolerating diet, vitally stable.  Set for discharge to home 9/16.

## 2021-09-10 NOTE — PROGRESS NOTE ADULT - PROBLEM SELECTOR PLAN 1
Bilirubin 10.4, INR 1.15  Liver function with obstructive pattern - likely choledocholithiasis, cholangitis  CT AP Shows: Dilated intra and extrahepatic biliary ducts. The common bile duct measures up to 1.5 cm in diameter which is dilated. The main pancreatic duct is mildly dilated as well. Apparent 1.4 cm stone or mass in the region of the ampulla. Dr. Jayleen FOOTE was consulted, pt is s/p ERCP with stent placement EUS yesterday, stones, pus, and sludge removed, with excellent drainage, possible choledococele found in ampulla, bx taken to r/o malignancy  Patient is doing well this AM. No new complaints. No fever, WBC 15k, no tachycardia    - on Zosyn for now  -ID consulted Dr Basilio agree to c/w  Zosyn  -has been NPO After midnight, clears for now  -continue clears today  -Consult surgery regarding urgent lap tiffanie Admitted for obstructive jaundice  Plan: On admission, Bilirubin 10.4, INR 1.15  Liver function with obstructive pattern - likely choledocholithiasis, cholangitis  CT AP Shows: Dilated intra and extrahepatic biliary ducts. The common bile duct measures up to 1.5 cm in diameter which is dilated. The main pancreatic duct is mildly dilated as well. Apparent 1.4 cm stone or mass in the region of the ampulla. Dr. Jayleen FOOTE was consulted, pt is s/p ERCP with stent placement EUS yesterday, stones, pus, and sludge removed, with excellent drainage, possible choledococele found in ampulla, bx taken to r/o malignancy  Patient is doing well this AM. No new complaints. No fever, WBC downtrending 11.9 this AM, no tachycardia  - s/p ERCP yesterday  - on Zosyn Day 3/7  - ID consulted Dr Basilio agree to c/w Zosyn  - GI following, recs appreciated  - started on clears yesterday, advance diet as tolerated  - monitor postprandial pain  - Consult surgery regarding urgent lap tiffanie inpatient vs outpatient

## 2021-09-10 NOTE — DISCHARGE NOTE PROVIDER - NSDCFUADDINST_GEN_ALL_CORE_FT
negative... Do not submerge in water (bath) for two weeks (until wounds have healed).  Maintain a low fat diet  Attend surgery clinic with Dr. Douglass next week  Attend GI clinic in two weeks to plan removal of stent  Return to the hospital for worsening abdominal pain, jaundice that returns, poor food tolerance, fevers

## 2021-09-11 DIAGNOSIS — R41.3 OTHER AMNESIA: ICD-10-CM

## 2021-09-11 LAB
ALBUMIN SERPL ELPH-MCNC: 2.5 G/DL — LOW (ref 3.5–5)
ALP SERPL-CCNC: 451 U/L — HIGH (ref 40–120)
ALT FLD-CCNC: 110 U/L DA — HIGH (ref 10–60)
ANION GAP SERPL CALC-SCNC: 6 MMOL/L — SIGNIFICANT CHANGE UP (ref 5–17)
AST SERPL-CCNC: 45 U/L — HIGH (ref 10–40)
BASOPHILS # BLD AUTO: 0.06 K/UL — SIGNIFICANT CHANGE UP (ref 0–0.2)
BASOPHILS NFR BLD AUTO: 0.6 % — SIGNIFICANT CHANGE UP (ref 0–2)
BILIRUB DIRECT SERPL-MCNC: 1.9 MG/DL — HIGH (ref 0–0.2)
BILIRUB SERPL-MCNC: 2.9 MG/DL — HIGH (ref 0.2–1.2)
BUN SERPL-MCNC: 14 MG/DL — SIGNIFICANT CHANGE UP (ref 7–18)
CALCIUM SERPL-MCNC: 8.3 MG/DL — LOW (ref 8.4–10.5)
CHLORIDE SERPL-SCNC: 101 MMOL/L — SIGNIFICANT CHANGE UP (ref 96–108)
CO2 SERPL-SCNC: 28 MMOL/L — SIGNIFICANT CHANGE UP (ref 22–31)
CREAT SERPL-MCNC: 0.89 MG/DL — SIGNIFICANT CHANGE UP (ref 0.5–1.3)
CULTURE RESULTS: NO GROWTH — SIGNIFICANT CHANGE UP
EOSINOPHIL # BLD AUTO: 0.21 K/UL — SIGNIFICANT CHANGE UP (ref 0–0.5)
EOSINOPHIL NFR BLD AUTO: 2.1 % — SIGNIFICANT CHANGE UP (ref 0–6)
GLUCOSE BLDC GLUCOMTR-MCNC: 154 MG/DL — HIGH (ref 70–99)
GLUCOSE BLDC GLUCOMTR-MCNC: 185 MG/DL — HIGH (ref 70–99)
GLUCOSE BLDC GLUCOMTR-MCNC: 235 MG/DL — HIGH (ref 70–99)
GLUCOSE BLDC GLUCOMTR-MCNC: 242 MG/DL — HIGH (ref 70–99)
GLUCOSE SERPL-MCNC: 151 MG/DL — HIGH (ref 70–99)
HCT VFR BLD CALC: 28.4 % — LOW (ref 34.5–45)
HGB BLD-MCNC: 9.9 G/DL — LOW (ref 11.5–15.5)
IMM GRANULOCYTES NFR BLD AUTO: 0.8 % — SIGNIFICANT CHANGE UP (ref 0–1.5)
LIDOCAIN IGE QN: 941 U/L — HIGH (ref 73–393)
LYMPHOCYTES # BLD AUTO: 1.71 K/UL — SIGNIFICANT CHANGE UP (ref 1–3.3)
LYMPHOCYTES # BLD AUTO: 17.3 % — SIGNIFICANT CHANGE UP (ref 13–44)
MAGNESIUM SERPL-MCNC: 1.8 MG/DL — SIGNIFICANT CHANGE UP (ref 1.6–2.6)
MCHC RBC-ENTMCNC: 30.2 PG — SIGNIFICANT CHANGE UP (ref 27–34)
MCHC RBC-ENTMCNC: 34.9 GM/DL — SIGNIFICANT CHANGE UP (ref 32–36)
MCV RBC AUTO: 86.6 FL — SIGNIFICANT CHANGE UP (ref 80–100)
MONOCYTES # BLD AUTO: 0.51 K/UL — SIGNIFICANT CHANGE UP (ref 0–0.9)
MONOCYTES NFR BLD AUTO: 5.2 % — SIGNIFICANT CHANGE UP (ref 2–14)
NEUTROPHILS # BLD AUTO: 7.29 K/UL — SIGNIFICANT CHANGE UP (ref 1.8–7.4)
NEUTROPHILS NFR BLD AUTO: 74 % — SIGNIFICANT CHANGE UP (ref 43–77)
NRBC # BLD: 0 /100 WBCS — SIGNIFICANT CHANGE UP (ref 0–0)
PHOSPHATE SERPL-MCNC: 2.6 MG/DL — SIGNIFICANT CHANGE UP (ref 2.5–4.5)
PLATELET # BLD AUTO: 318 K/UL — SIGNIFICANT CHANGE UP (ref 150–400)
POTASSIUM SERPL-MCNC: 4.2 MMOL/L — SIGNIFICANT CHANGE UP (ref 3.5–5.3)
POTASSIUM SERPL-SCNC: 4.2 MMOL/L — SIGNIFICANT CHANGE UP (ref 3.5–5.3)
PROT SERPL-MCNC: 7.3 G/DL — SIGNIFICANT CHANGE UP (ref 6–8.3)
RBC # BLD: 3.28 M/UL — LOW (ref 3.8–5.2)
RBC # FLD: 17.1 % — HIGH (ref 10.3–14.5)
SODIUM SERPL-SCNC: 135 MMOL/L — SIGNIFICANT CHANGE UP (ref 135–145)
SPECIMEN SOURCE: SIGNIFICANT CHANGE UP
WBC # BLD: 9.86 K/UL — SIGNIFICANT CHANGE UP (ref 3.8–10.5)
WBC # FLD AUTO: 9.86 K/UL — SIGNIFICANT CHANGE UP (ref 3.8–10.5)

## 2021-09-11 PROCEDURE — 99232 SBSQ HOSP IP/OBS MODERATE 35: CPT

## 2021-09-11 PROCEDURE — 99233 SBSQ HOSP IP/OBS HIGH 50: CPT | Mod: GC

## 2021-09-11 RX ORDER — CEFUROXIME AXETIL 250 MG
500 TABLET ORAL EVERY 12 HOURS
Refills: 0 | Status: DISCONTINUED | OUTPATIENT
Start: 2021-09-11 | End: 2021-09-15

## 2021-09-11 RX ORDER — POTASSIUM CHLORIDE 20 MEQ
40 PACKET (EA) ORAL ONCE
Refills: 0 | Status: COMPLETED | OUTPATIENT
Start: 2021-09-11 | End: 2021-09-11

## 2021-09-11 RX ORDER — METRONIDAZOLE 500 MG
500 TABLET ORAL THREE TIMES A DAY
Refills: 0 | Status: DISCONTINUED | OUTPATIENT
Start: 2021-09-11 | End: 2021-09-15

## 2021-09-11 RX ADMIN — Medication 500 MILLIGRAM(S): at 18:47

## 2021-09-11 RX ADMIN — HEPARIN SODIUM 5000 UNIT(S): 5000 INJECTION INTRAVENOUS; SUBCUTANEOUS at 14:09

## 2021-09-11 RX ADMIN — Medication 2: at 18:24

## 2021-09-11 RX ADMIN — Medication 0: at 22:32

## 2021-09-11 RX ADMIN — PIPERACILLIN AND TAZOBACTAM 25 GRAM(S): 4; .5 INJECTION, POWDER, LYOPHILIZED, FOR SOLUTION INTRAVENOUS at 14:08

## 2021-09-11 NOTE — PROGRESS NOTE ADULT - PROBLEM SELECTOR PLAN 2
Patient presented with jaundice, RUQ tenderness and chills. Also with transaminitis and CT scan c/w biliary obstruction s/p ERCP with stent placement  - f/u biopsy path pending  - tolerated CLD advanced to soft  - continue IV zosyn for now, will switch to PO ceftin and flagyl on discharge  - goals of care discussion with daughters, decided to move forward with inpatient cholestectomy  - surgery consulted, plan for surgery monday/tuesday next week   - appreciate BRIGITTE tristan

## 2021-09-11 NOTE — PROGRESS NOTE ADULT - PROBLEM SELECTOR PLAN 3
2/2 gallstones likely.   lipase downtrending, patient tolerating full liquid diet, will advance as tolerated  cholestectomy plan as above

## 2021-09-11 NOTE — PROGRESS NOTE ADULT - ASSESSMENT
81F, ambulates independently, lives alone at home with HHA [id number] (on 2nd floor, daughter Jodee  lives on 1st floor, daughter Delfina  lives 2 houses down) with PMH HTN, DM, PSH Diverticulitis needing exlap, p/w progressive jaundice x 1 week admitted for obstructive jaundice

## 2021-09-11 NOTE — PROGRESS NOTE ADULT - SUBJECTIVE AND OBJECTIVE BOX
Patient is a 81y old  Female who presents with a chief complaint of Jaundice (10 Sep 2021 16:29)      SUBJECTIVE / OVERNIGHT EVENTS: NATE events overnight, patient wants to go home. Discussed with daughters via conference call the plan today, plan to move forward with surgery   ADDITIONAL REVIEW OF SYSTEMS: negative as per above    MEDICATIONS  (STANDING):  donepezil 10 milliGRAM(s) Oral at bedtime  heparin   Injectable 5000 Unit(s) SubCutaneous every 8 hours  insulin glargine Injectable (LANTUS) 9 Unit(s) SubCutaneous at bedtime  insulin lispro (ADMELOG) corrective regimen sliding scale   SubCutaneous three times a day before meals  insulin lispro (ADMELOG) corrective regimen sliding scale   SubCutaneous at bedtime  lactated ringers. 1000 milliLiter(s) (150 mL/Hr) IV Continuous <Continuous>  pantoprazole  Injectable 40 milliGRAM(s) IV Push daily  piperacillin/tazobactam IVPB.. 3.375 Gram(s) IV Intermittent every 8 hours    MEDICATIONS  (PRN):  acetaminophen   Tablet .. 325 milliGRAM(s) Oral every 12 hours PRN Temp greater or equal to 38C (100.4F), Mild Pain (1 - 3)  traMADol 25 milliGRAM(s) Oral every 8 hours PRN Severe Pain (7 - 10)      CAPILLARY BLOOD GLUCOSE      POCT Blood Glucose.: 242 mg/dL (11 Sep 2021 12:03)  POCT Blood Glucose.: 154 mg/dL (11 Sep 2021 08:15)  POCT Blood Glucose.: 85 mg/dL (10 Sep 2021 21:00)  POCT Blood Glucose.: 227 mg/dL (10 Sep 2021 15:56)    I&O's Summary      PHYSICAL EXAM:  Vital Signs Last 24 Hrs  T(C): 36.2 (11 Sep 2021 13:49), Max: 37 (10 Sep 2021 19:21)  T(F): 97.2 (11 Sep 2021 13:49), Max: 98.6 (10 Sep 2021 19:21)  HR: 61 (11 Sep 2021 13:49) (55 - 61)  BP: 171/67 (11 Sep 2021 13:49) (148/82 - 171/67)  BP(mean): --  RR: 17 (11 Sep 2021 13:49) (16 - 17)  SpO2: 99% (11 Sep 2021 13:49) (97% - 99%)    GENERAL: NAD, AAOx3   CHEST/LUNG: Clear to auscultation bilaterally; No wheeze  HEART: Regular rate and rhythm; No murmurs, rubs, or gallops  ABDOMEN: Soft, Nontender, Nondistended; Bowel sounds present  EXTREMITIES:  2+ Peripheral Pulses, No clubbing, cyanosis, or edema  PSYCH: AAOx2-3     LABS:                        9.9    9.86  )-----------( 318      ( 11 Sep 2021 08:09 )             28.4     09-11    135  |  101  |  14  ----------------------------<  151<H>  4.2   |  28  |  0.89    Ca    8.3<L>      11 Sep 2021 08:09  Phos  2.6     09-11  Mg     1.8     09-11    TPro  7.3  /  Alb  2.5<L>  /  TBili  2.9<H>  /  DBili  1.9<H>  /  AST  45<H>  /  ALT  110<H>  /  AlkPhos  451<H>  09-11    PT/INR - ( 10 Sep 2021 06:03 )   PT: 13.1 sec;   INR: 1.11 ratio                   Culture - Urine (collected 10 Sep 2021 10:43)  Source: Clean Catch Clean Catch (Midstream)  Final Report (11 Sep 2021 08:39):    No growth      Trend Procalcitonin          D-Dimer:      RADIOLOGY & ADDITIONAL TESTS:  Results Reviewed:   Imaging Personally Reviewed:  Electrocardiogram Personally Reviewed:    COORDINATION OF CARE:  Care Discussed with Consultants/Other Providers [Y/N]:  Prior or Outpatient Records Reviewed [Y/N]:

## 2021-09-11 NOTE — PROGRESS NOTE ADULT - SUBJECTIVE AND OBJECTIVE BOX
INTERVAL HPI/OVERNIGHT EVENTS:  Pt resting comfortably. No acute complaints.   Tolerating full liquid diet.   Denies N/V, abd pain.    MEDICATIONS  (STANDING):  donepezil 10 milliGRAM(s) Oral at bedtime  heparin   Injectable 5000 Unit(s) SubCutaneous every 8 hours  insulin glargine Injectable (LANTUS) 9 Unit(s) SubCutaneous at bedtime  insulin lispro (ADMELOG) corrective regimen sliding scale   SubCutaneous three times a day before meals  insulin lispro (ADMELOG) corrective regimen sliding scale   SubCutaneous at bedtime  lactated ringers. 1000 milliLiter(s) (150 mL/Hr) IV Continuous <Continuous>  pantoprazole  Injectable 40 milliGRAM(s) IV Push daily  piperacillin/tazobactam IVPB.. 3.375 Gram(s) IV Intermittent every 8 hours    MEDICATIONS  (PRN):  acetaminophen   Tablet .. 325 milliGRAM(s) Oral every 12 hours PRN Temp greater or equal to 38C (100.4F), Mild Pain (1 - 3)  traMADol 25 milliGRAM(s) Oral every 8 hours PRN Severe Pain (7 - 10)    Vital Signs Last 24 Hrs  T(C): 36.7 (11 Sep 2021 05:00), Max: 37 (10 Sep 2021 19:21)  T(F): 98.1 (11 Sep 2021 05:00), Max: 98.6 (10 Sep 2021 19:21)  HR: 60 (11 Sep 2021 05:00) (55 - 72)  BP: 148/82 (11 Sep 2021 05:00) (148/82 - 165/68)  BP(mean): --  RR: 16 (11 Sep 2021 05:00) (16 - 17)  SpO2: 97% (11 Sep 2021 05:00) (97% - 97%)    Physical:  General: A&Ox3. NAD.  Abdomen: Soft nondistended, nontender.    LABS:                        9.9    9.86  )-----------( 318      ( 11 Sep 2021 08:09 )             28.4             09-11    135  |  101  |  14  ----------------------------<  151<H>  4.2   |  28  |  0.89    Ca    8.3<L>      11 Sep 2021 08:09  Phos  2.6     09-11  Mg     1.8     09-11    TPro  7.3  /  Alb  2.5<L>  /  TBili  2.9<H>  /  DBili  1.9<H>  /  AST  45<H>  /  ALT  110<H>  /  AlkPhos  451<H>  09-11

## 2021-09-11 NOTE — PROGRESS NOTE ADULT - ASSESSMENT
Acute Cholecystitis - s/p ERCP  Leukocytosis - normalized    Plan:  ·	dc'ed Zosyn   ·	switched to ceftin 500mg PO BID and flagyl 500mg PO TID  ·	plan for cholecystectomy in coming week Acute Cholecystitis - s/p ERCP  Leukocytosis - normalized    Plan:  ·	dc'ed Zosyn   ·	switched to ceftin 500mg PO BID and flagyl 500mg PO TID  ·	plan for cholecystectomy in coming week    I agree with above

## 2021-09-11 NOTE — PROGRESS NOTE ADULT - PROBLEM SELECTOR PLAN 1
Cognitive impairment, likely 2/2 dementia. Follows with Dr. Moss neurologist outpatient  Does not have capacity, daughters are HCP  Reorientation if patient gets agitated, currently cooperative

## 2021-09-11 NOTE — PROGRESS NOTE ADULT - ASSESSMENT
81y.o. Female with resolving transaminitis s/p ERCP & metal stent by GI    -f/u path  -Plan for lap tiffanie next week if path negative  -Pt agrees with plan  -Med optimization and clearance  -Diet advancement per GI

## 2021-09-11 NOTE — PROGRESS NOTE ADULT - SUBJECTIVE AND OBJECTIVE BOX
81y Female is under our care for acute cholecystitis, s/p ERCP and stone extraction. Patient is doing much better, and has no new complaints at this time. Family is at bedside and they are concerned that patient will pull out her IV line when they are not present. Spoke with Dr. Basilio and he agrees that patient may be switched to PO antibiotics. Remains afebrile with normal wbc count. Plan for lap cholecystectomy in coming week.    REVIEW OF SYSTEMS:  [  ] Not able to illicit  General: no fevers no malaise  Chest: no cough no sob  GI: no nvd  : no urinary sxs   Skin: no rashes  Musculoskeletal: no trauma no LBP  Neuro: no ha's no dizziness     MEDS:  cefuroxime   Tablet 500 milliGRAM(s) Oral every 12 hours  metroNIDAZOLE    Tablet 500 milliGRAM(s) Oral three times a day    ALLERGIES: Allergies    No Known Allergies    Intolerances      VITALS:  Vital Signs Last 24 Hrs  T(C): 36.2 (11 Sep 2021 13:49), Max: 37 (10 Sep 2021 19:21)  T(F): 97.2 (11 Sep 2021 13:49), Max: 98.6 (10 Sep 2021 19:21)  HR: 61 (11 Sep 2021 13:49) (55 - 61)  BP: 171/67 (11 Sep 2021 13:49) (148/82 - 171/67)  BP(mean): --  RR: 17 (11 Sep 2021 13:49) (16 - 17)  SpO2: 99% (11 Sep 2021 13:49) (97% - 99%)      PHYSICAL EXAM:  HEENT: n/a  Neck: supple no LN's   Respiratory: lungs clear no rales  Cardiovascular: S1 S2 reg no murmurs  Gastrointestinal: +BS with soft, nondistended abdomen; mild RUQ tenderness  Extremities: no edema  Skin: no rashes  Ortho: n/a  Neuro: AAO x 2-3      LABS/DIAGNOSTIC TESTS:                        9.9    9.86  )-----------( 318      ( 11 Sep 2021 08:09 )             28.4     WBC Count: 9.86 K/uL (09-11 @ 08:09)  WBC Count: 11.99 K/uL (09-10 @ 06:03)  WBC Count: 13.47 K/uL (09-09 @ 15:16)  WBC Count: 15.29 K/uL (09-08 @ 11:16)    09-11    135  |  101  |  14  ----------------------------<  151<H>  4.2   |  28  |  0.89    Ca    8.3<L>      11 Sep 2021 08:09  Phos  2.6     09-11  Mg     1.8     09-11    TPro  7.3  /  Alb  2.5<L>  /  TBili  2.9<H>  /  DBili  1.9<H>  /  AST  45<H>  /  ALT  110<H>  /  AlkPhos  451<H>  09-11      CULTURES:   Clean Catch Clean Catch (Midstream)  09-10 @ 10:43   No growth  --  --        RADIOLOGY:  no new studies

## 2021-09-12 DIAGNOSIS — Z01.818 ENCOUNTER FOR OTHER PREPROCEDURAL EXAMINATION: ICD-10-CM

## 2021-09-12 LAB
GLUCOSE BLDC GLUCOMTR-MCNC: 191 MG/DL — HIGH (ref 70–99)
GLUCOSE BLDC GLUCOMTR-MCNC: 193 MG/DL — HIGH (ref 70–99)
GLUCOSE BLDC GLUCOMTR-MCNC: 259 MG/DL — HIGH (ref 70–99)
GLUCOSE BLDC GLUCOMTR-MCNC: 360 MG/DL — HIGH (ref 70–99)
IRON SATN MFR SERPL: 20 % — SIGNIFICANT CHANGE UP (ref 15–50)
IRON SATN MFR SERPL: 39 UG/DL — LOW (ref 40–150)
TIBC SERPL-MCNC: 200 UG/DL — LOW (ref 250–450)
UIBC SERPL-MCNC: 161 UG/DL — SIGNIFICANT CHANGE UP (ref 110–370)

## 2021-09-12 PROCEDURE — 99232 SBSQ HOSP IP/OBS MODERATE 35: CPT

## 2021-09-12 PROCEDURE — 99233 SBSQ HOSP IP/OBS HIGH 50: CPT | Mod: GC

## 2021-09-12 RX ORDER — SODIUM CHLORIDE 9 MG/ML
1000 INJECTION, SOLUTION INTRAVENOUS
Refills: 0 | Status: DISCONTINUED | OUTPATIENT
Start: 2021-09-12 | End: 2021-09-15

## 2021-09-12 RX ORDER — INSULIN GLARGINE 100 [IU]/ML
12 INJECTION, SOLUTION SUBCUTANEOUS AT BEDTIME
Refills: 0 | Status: DISCONTINUED | OUTPATIENT
Start: 2021-09-12 | End: 2021-09-15

## 2021-09-12 RX ORDER — LANOLIN ALCOHOL/MO/W.PET/CERES
1 CREAM (GRAM) TOPICAL AT BEDTIME
Refills: 0 | Status: COMPLETED | OUTPATIENT
Start: 2021-09-12 | End: 2021-09-12

## 2021-09-12 RX ORDER — INSULIN LISPRO 100/ML
3 VIAL (ML) SUBCUTANEOUS
Refills: 0 | Status: DISCONTINUED | OUTPATIENT
Start: 2021-09-12 | End: 2021-09-15

## 2021-09-12 RX ORDER — GLUCAGON INJECTION, SOLUTION 0.5 MG/.1ML
1 INJECTION, SOLUTION SUBCUTANEOUS ONCE
Refills: 0 | Status: DISCONTINUED | OUTPATIENT
Start: 2021-09-12 | End: 2021-09-15

## 2021-09-12 RX ADMIN — Medication 500 MILLIGRAM(S): at 13:50

## 2021-09-12 RX ADMIN — HEPARIN SODIUM 5000 UNIT(S): 5000 INJECTION INTRAVENOUS; SUBCUTANEOUS at 20:58

## 2021-09-12 RX ADMIN — Medication 1 MILLIGRAM(S): at 20:58

## 2021-09-12 RX ADMIN — Medication 1: at 20:59

## 2021-09-12 RX ADMIN — Medication 1: at 17:30

## 2021-09-12 RX ADMIN — INSULIN GLARGINE 12 UNIT(S): 100 INJECTION, SOLUTION SUBCUTANEOUS at 20:58

## 2021-09-12 RX ADMIN — Medication 500 MILLIGRAM(S): at 17:30

## 2021-09-12 RX ADMIN — Medication 500 MILLIGRAM(S): at 20:57

## 2021-09-12 RX ADMIN — DONEPEZIL HYDROCHLORIDE 10 MILLIGRAM(S): 10 TABLET, FILM COATED ORAL at 20:57

## 2021-09-12 RX ADMIN — Medication 5: at 12:12

## 2021-09-12 NOTE — PROGRESS NOTE ADULT - SUBJECTIVE AND OBJECTIVE BOX
81y Female is under our care for acute cholecystitis, s/p ERCP and stone extraction. Patient has been noncompliant and refusing her PO antibiotics and blood work. She denies any symptoms and just wants to go home. Daughter will be arriving shortly to talk to her mother about her actions. Plan for lap cholecystectomy this coming week.    REVIEW OF SYSTEMS:  [  ] Not able to illicit  General: no fevers no malaise  Chest: no cough no sob  GI: no nvd  : no urinary sxs   Skin: no rashes  Musculoskeletal: no trauma no LBP  Neuro: no ha's no dizziness     MEDS:  cefuroxime   Tablet 500 milliGRAM(s) Oral every 12 hours  metroNIDAZOLE    Tablet 500 milliGRAM(s) Oral three times a day    ALLERGIES: Allergies    No Known Allergies    Intolerances      VITALS:  Vital Signs Last 24 Hrs  T(C): 36.8 (12 Sep 2021 13:27), Max: 36.9 (11 Sep 2021 20:49)  T(F): 98.2 (12 Sep 2021 13:27), Max: 98.5 (11 Sep 2021 20:49)  HR: 62 (12 Sep 2021 13:27) (62 - 62)  BP: 136/57 (12 Sep 2021 13:27) (136/57 - 152/72)  BP(mean): --  RR: 18 (12 Sep 2021 13:27) (18 - 18)  SpO2: 98% (12 Sep 2021 13:27) (96% - 98%)      PHYSICAL EXAM:  HEENT: n/a  Neck: supple no LN's   Respiratory: lungs clear no rales  Cardiovascular: S1 S2 reg no murmurs  Gastrointestinal: +BS with soft, nondistended abdomen; faint RUQ tenderness  Extremities: no edema  Skin: no rashes  Ortho: n/a  Neuro: AAO x 2, more abrasive with her answers today      LABS/DIAGNOSTIC TESTS:  No new labs - patient refused       CULTURES:   Clean Catch Clean Catch (Midstream)  09-10 @ 10:43   No growth  --  --        RADIOLOGY:  no new studies

## 2021-09-12 NOTE — PROGRESS NOTE ADULT - PROBLEM SELECTOR PLAN 8
No hold dvt prophylaxis for now as pt is for procedure  resume after procedure if ok with GI - lovenox 40 sq qd Pt w/ SCr 1.5 on admission  Baseline SCr unknown  - now 0.89 resolved

## 2021-09-12 NOTE — PROGRESS NOTE ADULT - ATTENDING COMMENTS
Patient tolerating PO intake well, diet advanced to mechanical soft. Patient does get agitated but can be easily reoriented.   RCRI score 2, patient is low-moderate risk for intermediate procedure. Medically optimized for cholestectomy

## 2021-09-12 NOTE — PROGRESS NOTE ADULT - PROBLEM SELECTOR PLAN 7
Pt w/ SCr 1.5 on admission  Baseline SCr unknown  F/U Urine Lytes, calculate FeNa  IVF for now, follow BMP daily Pt w/ SCr 1.5 on admission  Baseline SCr unknown  - now 0.89 resolved on janumet and glimeperide, came in with sugars 400s. Likely d/t pancreatitis, and beta cell transient dysfunction.  FS ACHS  maintain fs 140-180  On lantus 9 and sliding scale to determine nutritional  f/u a1c on janumet and glimeperide, came in with sugars 400s. Likely d/t pancreatitis, and beta cell transient dysfunction.  FS ACHS  maintain fs 140-180  On lantus 9 and sliding scale to determine nutritional  Will give 3 units before lunch.  f/u a1c

## 2021-09-12 NOTE — PROGRESS NOTE ADULT - ASSESSMENT
81F, ambulates independently, lives alone at home with HHA [id number] (on 2nd floor, daughter Jodee  lives on 1st floor, daughter Delfina  lives 2 houses down) with PMH HTN, DM, PSH Diverticulitis needing exlap, p/w progressive jaundice x 1 week admitted for obstructive jaundice

## 2021-09-12 NOTE — PROGRESS NOTE ADULT - PROBLEM SELECTOR PLAN 2
Admitted for obstructive jaundice  Plan: On admission, Bilirubin 10.4, INR 1.15  Liver function with obstructive pattern - likely choledocholithiasis, cholangitis  CT AP Shows: Dilated intra and extrahepatic biliary ducts. The common bile duct measures up to 1.5 cm in diameter which is dilated. The main pancreatic duct is mildly dilated as well. Apparent 1.4 cm stone or mass in the region of the ampulla. Dr. Jayleen FOOTE was consulted, pt is s/p ERCP with stent placement EUS yesterday, stones, pus, and sludge removed, with excellent drainage, possible choledococele found in ampulla, bx taken to r/o malignancy  Patient is doing well this AM. No new complaints. No fever, WBC downtrending 11.9 this AM, no tachycardia  - s/p ERCP yesterday  - C/w Ceftin flagyl EOT 12th  - ID consulted Dr Basilio agree to c/w Zosyn  - GI following, recs appreciated  - started on clears POD 1 now on soft-continue  - monitor postprandial pain  - Consulted surgery lap tiffanie this week after path results Admitted for obstructive jaundice  Plan: On admission, Bilirubin 10.4, INR 1.15  Liver function with obstructive pattern - likely choledocholithiasis, cholangitis  CT AP Shows: Dilated intra and extrahepatic biliary ducts. The common bile duct measures up to 1.5 cm in diameter which is dilated. The main pancreatic duct is mildly dilated as well. Apparent 1.4 cm stone or mass in the region of the ampulla. Dr. Jayleen FOOTE was consulted, pt is s/p ERCP with stent placement EUS yesterday, stones, pus, and sludge removed, with excellent drainage, possible choledococele found in ampulla, bx taken to r/o malignancy  Patient is doing well this AM. No new complaints. No fever, WBC downtrending 11.9 this AM, no tachycardia  - s/p ERCP yesterday  - C/w Ceftin BID, flagyl TID  EOT 9/17/2021  - GI following, recs appreciated  - started on clears POD 1 now on soft-continue  - monitor postprandial pain  - Consulted surgery lap tiffanie this week after path results Admitted for obstructive jaundice  Plan: On admission, Bilirubin 10.4, INR 1.15  Liver function with obstructive pattern - likely choledocholithiasis, cholangitis  CT AP Shows: Dilated intra and extrahepatic biliary ducts. The common bile duct measures up to 1.5 cm in diameter which is dilated. The main pancreatic duct is mildly dilated as well. Apparent 1.4 cm stone or mass in the region of the ampulla. Dr. Jayleen FOOTE was consulted, pt is s/p ERCP with stent placement EUS yesterday, stones, pus, and sludge removed, with excellent drainage, possible choledococele found in ampulla, bx taken to r/o malignancy  Patient is doing well this AM. No new complaints. No fever, WBC downtrending 11.9 this AM, no tachycardia  - s/p ERCP Thursday 9/9  - C/w Ceftin BID, flagyl TID  EOT 9/17/2021  - GI following, recs appreciated  - started on clears POD 1 now on soft-continue  - monitor postprandial pain  - Consulted surgery lap tiffanie this week after path results recent dementia on donepezil- follows with Dr. Moss outpatient  given 1 dose seroquel 25 mg qd   needs frequent reorientation  Daughters HCP have decision to stay until she is s/p lap tiffanie

## 2021-09-12 NOTE — PROGRESS NOTE ADULT - PROBLEM SELECTOR PLAN 3
Mid epigastric pain, jaundice, tenderness on exam. Lipase 12K CT shows  possible obstructive stone in the ampulla  Sx consistent with gallstone pancreatitis, lipase is now 4000s from [id number]  -Clear liquids ok'd by GI POD 1 now advanced to soft  -trend post ERCP lipase and monitor for pain  -IVF LR 150cc/hr for now  -Tramadol 25 q8 + Tylenol 325 q12 prn for pain control for now  -s/p 1 L bolus in ED Admitted for obstructive jaundice  Plan: On admission, Bilirubin 10.4, INR 1.15  Liver function with obstructive pattern - likely choledocholithiasis, cholangitis  CT AP Shows: Dilated intra and extrahepatic biliary ducts. The common bile duct measures up to 1.5 cm in diameter which is dilated. The main pancreatic duct is mildly dilated as well. Apparent 1.4 cm stone or mass in the region of the ampulla. Dr. Jayleen FOOTE was consulted, pt is s/p ERCP with stent placement EUS yesterday, stones, pus, and sludge removed, with excellent drainage, possible choledococele found in ampulla, bx taken to r/o malignancy  Patient is doing well this AM. No new complaints. No fever, WBC downtrending 11.9 this AM, no tachycardia  - s/p ERCP Thursday 9/9  - C/w Ceftin BID, flagyl TID  EOT 9/17/2021  - GI following, recs appreciated  - started on clears POD 1 now on soft-continue  - monitor postprandial pain  - Consulted surgery lap tiffanie this week after path results

## 2021-09-12 NOTE — PROGRESS NOTE ADULT - PROBLEM SELECTOR PLAN 6
on janumet and glimeperide, came in with sugars 400s. Likely d/t pancreatitis, and beta cell transient dysfunction.  FS ACHS  maintain fs 140-180  On lantus 9 and sliding scale to determine nutritional  f/u a1c on home losartaqn hctz nifedipine  hold all htn meds for now  monitor bp  resume as needed

## 2021-09-12 NOTE — PROGRESS NOTE ADULT - PROBLEM SELECTOR PLAN 1
recent dementia on donepezil- follows with Dr. Moss outpatient  given 1 dose seroquel 25 mg qd   needs frequent reorientation  Daughters HCP have decision to stay until she is s/p lap tiffanie Patient does not have capacity; daughter Delfina Her is the HCP  Daughter Delfina was called to answer questions for preoperative evaluation  - No Hx of CAD, valvular disease, prior cardiac surgery, Atrial fibrillation  - No chest pain with walking, exertion  - No Hx of bleeding, anesthesia complications   - Can walk 4 blocks without SOB  - Prior history of DVT >12 years ago, was taken off AC 6 months ago  - Has sporadic episodes of coughing in the middle of the night    RCRI score  [x] Elevated risk of surgery intraperitoneal             1  [] History of Ischemic heart disease                       0  [] History of CHF                                                  0  [] History of Cerebrovascular disease                    0  [x] Preop treatment with insulin                               1  [] Pre-operative creatinine >2 mg/dL / 176.8 µmol/L  0    2 points, Class II risk 10.1% risk of 30-day risk of death, MI, or cardiac arrest

## 2021-09-12 NOTE — PROGRESS NOTE ADULT - SUBJECTIVE AND OBJECTIVE BOX
PGY-1 Progress Note discussed with attending    PAGER #: [--------] TILL 5:00 PM  PLEASE CONTACT ON CALL TEAM:  - On Call Team (Please refer to Vicky) FROM 5:00 PM - 8:30PM  - Nightfloat Team FROM 8:30 -7:30 AM    CHIEF COMPLAINT & BRIEF HOSPITAL COURSE:  81F, ambulates independently, lives alone at home with [facility] [id number] (on 2nd floor, daughter Jodee  lives on 1st floor, daughter  (HPI historian)  Delfina  lives 2 houses down) with PMH HTN, DM, PSH Diverticulitis needing exlap.    She p/w progressive jaundice x 1 week worsened over 2 days prior to admission. She was overtly yellow on the day of admission which prompted ED visit.     Pt has been complaining about intermittent epigastric postprandial pain. SX are associated with chills, tactile fevers, pruritus, elevated home FS sugars and decreased PO intake and subsequent 8lb weight loss in the last 2 weeks      Pt is recently diagnosed with dementia x1yr since getting COVID in 2020. As per family, patient has outbursts of anger and has poor short term memory during the interview.    In the ED: AFVSS, On  Exam: epigastric tenderness and jaundice on face.  Labs: WCBC 15, Hgb 10.3, Na 131, Cr 1.56, glucose 400s Bili 10.4, Alk phos 640 ALST 250  , lipase [id number]  Urine contaminated  Hepatitis panel non reactive  CT Abd- dilated CBD, Dilated pancreatic duct, stone in ampulla and small stones in the galbladder.    She was admitted for obstructive jaundice and work up of choledocholithiasis vs cholangitis, and for subsequent gal stone pancreatitis.      INTERVAL HPI/OVERNIGHT EVENTS:   S/p ERCP stone removal and stent placement  ON ABX    MEDICATIONS  (STANDING):  cefuroxime   Tablet 500 milliGRAM(s) Oral every 12 hours  donepezil 10 milliGRAM(s) Oral at bedtime  heparin   Injectable 5000 Unit(s) SubCutaneous every 8 hours  insulin glargine Injectable (LANTUS) 9 Unit(s) SubCutaneous at bedtime  insulin lispro (ADMELOG) corrective regimen sliding scale   SubCutaneous three times a day before meals  insulin lispro (ADMELOG) corrective regimen sliding scale   SubCutaneous at bedtime  lactated ringers. 1000 milliLiter(s) (150 mL/Hr) IV Continuous <Continuous>  metroNIDAZOLE    Tablet 500 milliGRAM(s) Oral three times a day  pantoprazole  Injectable 40 milliGRAM(s) IV Push daily    MEDICATIONS  (PRN):  acetaminophen   Tablet .. 325 milliGRAM(s) Oral every 12 hours PRN Temp greater or equal to 38C (100.4F), Mild Pain (1 - 3)  traMADol 25 milliGRAM(s) Oral every 8 hours PRN Severe Pain (7 - 10)      REVIEW OF SYSTEMS:  CONSTITUTIONAL: No fever, weight loss, or fatigue  RESPIRATORY: No cough, wheezing, chills or hemoptysis; No shortness of breath  CARDIOVASCULAR: No chest pain, palpitations, dizziness, or leg swelling  GASTROINTESTINAL: No abdominal pain. No nausea, vomiting, or hematemesis; No diarrhea or constipation. No melena or hematochezia.  GENITOURINARY: No dysuria or hematuria, urinary frequency  NEUROLOGICAL: No headaches, memory loss, loss of strength, numbness, or tremors  SKIN: No itching, burning, rashes, or lesions     Vital Signs Last 24 Hrs  T(C): 36.9 (11 Sep 2021 20:49), Max: 36.9 (11 Sep 2021 20:49)  T(F): 98.5 (11 Sep 2021 20:49), Max: 98.5 (11 Sep 2021 20:49)  HR: 62 (11 Sep 2021 20:49) (61 - 62)  BP: 152/72 (11 Sep 2021 20:49) (152/72 - 171/67)  BP(mean): --  RR: 18 (11 Sep 2021 20:49) (17 - 18)  SpO2: 96% (11 Sep 2021 20:49) (96% - 99%)    PHYSICAL EXAMINATION:  GENERAL: NAD, well built  HEAD:  Atraumatic, Normocephalic  EYES:  conjunctiva and sclera clear  NECK: Supple, No JVD, Normal thyroid  CHEST/LUNG: Clear to auscultation. Clear to percussion bilaterally; No rales, rhonchi, wheezing, or rubs  HEART: Regular rate and rhythm; No murmurs, rubs, or gallops  ABDOMEN: Soft, Nontender, Nondistended; Bowel sounds present  NERVOUS SYSTEM:  Alert & Oriented X3,    EXTREMITIES:  2+ Peripheral Pulses, No clubbing, cyanosis, or edema  SKIN: warm dry                          9.9    9.86  )-----------( 318      ( 11 Sep 2021 08:09 )             28.4     09-11    135  |  101  |  14  ----------------------------<  151<H>  4.2   |  28  |  0.89    Ca    8.3<L>      11 Sep 2021 08:09  Phos  2.6     09-11  Mg     1.8     09-11    TPro  7.3  /  Alb  2.5<L>  /  TBili  2.9<H>  /  DBili  1.9<H>  /  AST  45<H>  /  ALT  110<H>  /  AlkPhos  451<H>  09-11    LIVER FUNCTIONS - ( 11 Sep 2021 08:09 )  Alb: 2.5 g/dL / Pro: 7.3 g/dL / ALK PHOS: 451 U/L / ALT: 110 U/L DA / AST: 45 U/L / GGT: x                   CAPILLARY BLOOD GLUCOSE      RADIOLOGY & ADDITIONAL TESTS:                   PGY-1 Progress Note discussed with attending    PAGER #: [--------] TILL 5:00 PM  PLEASE CONTACT ON CALL TEAM:  - On Call Team (Please refer to Vicky) FROM 5:00 PM - 8:30PM  - Nightfloat Team FROM 8:30 -7:30 AM    CHIEF COMPLAINT & BRIEF HOSPITAL COURSE:  81F, ambulates independently, lives alone at home with [facility] [id number] (on 2nd floor, daughter Jodee  lives on 1st floor, daughter  (HPI historian)  Delfina  lives 2 houses down) with PMH HTN, DM, PSH Diverticulitis needing exlap.    She p/w progressive jaundice x 1 week worsened over 2 days prior to admission. She was overtly yellow on the day of admission which prompted ED visit.     Pt has been complaining about intermittent epigastric postprandial pain. SX are associated with chills, tactile fevers, pruritus, elevated home FS sugars and decreased PO intake and subsequent 8lb weight loss in the last 2 weeks      Pt is recently diagnosed with dementia x1yr since getting COVID in 2020. As per family, patient has outbursts of anger and has poor short term memory during the interview.    In the ED: AFVSS, On  Exam: epigastric tenderness and jaundice on face.  Labs: WCBC 15, Hgb 10.3, Na 131, Cr 1.56, glucose 400s Bili 10.4, Alk phos 640 ALST 250  , lipase [id number]  Urine contaminated  Hepatitis panel non reactive  CT Abd- dilated CBD, Dilated pancreatic duct, stone in ampulla and small stones in the galbladder.    She was admitted for obstructive jaundice and work up of choledocholithiasis vs cholangitis, and for subsequent gal stone pancreatitis.      INTERVAL HPI/OVERNIGHT EVENTS:   S/p ERCP stone removal and stent placement  ON ABX PO however, patient is refusing  May need reorient    MEDICATIONS  (STANDING):  cefuroxime   Tablet 500 milliGRAM(s) Oral every 12 hours  donepezil 10 milliGRAM(s) Oral at bedtime  heparin   Injectable 5000 Unit(s) SubCutaneous every 8 hours  insulin glargine Injectable (LANTUS) 9 Unit(s) SubCutaneous at bedtime  insulin lispro (ADMELOG) corrective regimen sliding scale   SubCutaneous three times a day before meals  insulin lispro (ADMELOG) corrective regimen sliding scale   SubCutaneous at bedtime  lactated ringers. 1000 milliLiter(s) (150 mL/Hr) IV Continuous <Continuous>  metroNIDAZOLE    Tablet 500 milliGRAM(s) Oral three times a day  pantoprazole  Injectable 40 milliGRAM(s) IV Push daily    MEDICATIONS  (PRN):  acetaminophen   Tablet .. 325 milliGRAM(s) Oral every 12 hours PRN Temp greater or equal to 38C (100.4F), Mild Pain (1 - 3)  traMADol 25 milliGRAM(s) Oral every 8 hours PRN Severe Pain (7 - 10)      REVIEW OF SYSTEMS:  Stated she was sleeping wanted to be left alone and was sleeping        Vital Signs Last 24 Hrs  T(C): 36.9 (11 Sep 2021 20:49), Max: 36.9 (11 Sep 2021 20:49)  T(F): 98.5 (11 Sep 2021 20:49), Max: 98.5 (11 Sep 2021 20:49)  HR: 62 (11 Sep 2021 20:49) (61 - 62)  BP: 152/72 (11 Sep 2021 20:49) (152/72 - 171/67)  BP(mean): --  RR: 18 (11 Sep 2021 20:49) (17 - 18)  SpO2: 96% (11 Sep 2021 20:49) (96% - 99%)    PHYSICAL EXAMINATION:  GENERAL: NAD, lying in bed comfortably  HEAD:  Atraumatic, Normocephalic  ENT: Moist mucous membranes  NECK: Supple, No JVD  EXTREMITIES:  2+ Peripheral Pulses, brisk capillary refill. No clubbing, cyanosis, or edema  NERVOUS SYSTEM:  Alert & Oriented X1, speech clear. No deficits   MSK: FROM all 4 extremities, full and equal strength  SKIN: No rashes or lesions                          9.9    9.86  )-----------( 318      ( 11 Sep 2021 08:09 )             28.4     09-11    135  |  101  |  14  ----------------------------<  151<H>  4.2   |  28  |  0.89    Ca    8.3<L>      11 Sep 2021 08:09  Phos  2.6     09-11  Mg     1.8     09-11    TPro  7.3  /  Alb  2.5<L>  /  TBili  2.9<H>  /  DBili  1.9<H>  /  AST  45<H>  /  ALT  110<H>  /  AlkPhos  451<H>  09-11    LIVER FUNCTIONS - ( 11 Sep 2021 08:09 )  Alb: 2.5 g/dL / Pro: 7.3 g/dL / ALK PHOS: 451 U/L / ALT: 110 U/L DA / AST: 45 U/L / GGT: x                   CAPILLARY BLOOD GLUCOSE      RADIOLOGY & ADDITIONAL TESTS:                   PGY-1 Progress Note discussed with attending    PAGER #: [--------] TILL 5:00 PM  PLEASE CONTACT ON CALL TEAM:  - On Call Team (Please refer to Vicky) FROM 5:00 PM - 8:30PM  - Nightfloat Team FROM 8:30 -7:30 AM    CHIEF COMPLAINT & BRIEF HOSPITAL COURSE:  81F, ambulates independently, lives alone at home with [facility] [id number] (on 2nd floor, daughter Jodee  lives on 1st floor, daughter  (HPI historian)  Delfina  lives 2 houses down) with PMH HTN, DM, PSH Diverticulitis needing exlap.    She p/w progressive jaundice x 1 week worsened over 2 days prior to admission. She was overtly yellow on the day of admission which prompted ED visit.     Pt has been complaining about intermittent epigastric postprandial pain. SX are associated with chills, tactile fevers, pruritus, elevated home FS sugars and decreased PO intake and subsequent 8lb weight loss in the last 2 weeks      Pt is recently diagnosed with dementia x1yr since getting COVID in 2020. As per family, patient has outbursts of anger and has poor short term memory during the interview.    In the ED: AFVSS, On  Exam: epigastric tenderness and jaundice on face.  Labs: WCBC 15, Hgb 10.3, Na 131, Cr 1.56, glucose 400s Bili 10.4, Alk phos 640 ALST 250  , lipase [id number]  Urine contaminated  Hepatitis panel non reactive  CT Abd- dilated CBD, Dilated pancreatic duct, stone in ampulla and small stones in the galbladder.    She was admitted for obstructive jaundice and work up of choledocholithiasis vs cholangitis, and for subsequent gal stone pancreatitis.      INTERVAL HPI/OVERNIGHT EVENTS:   S/p ERCP stone removal and stent placement  ON ABX PO however, patient is refusing  Needs frequent orientation  Nurse reports patient being combative and uncooperative last night  This AM cooperative  Does not remember ERCP and agreement with daughter to proceed with surgery this week    MEDICATIONS  (STANDING):  cefuroxime   Tablet 500 milliGRAM(s) Oral every 12 hours  donepezil 10 milliGRAM(s) Oral at bedtime  heparin   Injectable 5000 Unit(s) SubCutaneous every 8 hours  insulin glargine Injectable (LANTUS) 9 Unit(s) SubCutaneous at bedtime  insulin lispro (ADMELOG) corrective regimen sliding scale   SubCutaneous three times a day before meals  insulin lispro (ADMELOG) corrective regimen sliding scale   SubCutaneous at bedtime  lactated ringers. 1000 milliLiter(s) (150 mL/Hr) IV Continuous <Continuous>  metroNIDAZOLE    Tablet 500 milliGRAM(s) Oral three times a day  pantoprazole  Injectable 40 milliGRAM(s) IV Push daily    MEDICATIONS  (PRN):  acetaminophen   Tablet .. 325 milliGRAM(s) Oral every 12 hours PRN Temp greater or equal to 38C (100.4F), Mild Pain (1 - 3)  traMADol 25 milliGRAM(s) Oral every 8 hours PRN Severe Pain (7 - 10)      REVIEW OF SYSTEMS:  Stated she was sleeping wanted to be left alone and was sleeping        Vital Signs Last 24 Hrs  T(C): 36.9 (11 Sep 2021 20:49), Max: 36.9 (11 Sep 2021 20:49)  T(F): 98.5 (11 Sep 2021 20:49), Max: 98.5 (11 Sep 2021 20:49)  HR: 62 (11 Sep 2021 20:49) (61 - 62)  BP: 152/72 (11 Sep 2021 20:49) (152/72 - 171/67)  BP(mean): --  RR: 18 (11 Sep 2021 20:49) (17 - 18)  SpO2: 96% (11 Sep 2021 20:49) (96% - 99%)    PHYSICAL EXAMINATION:  GENERAL: NAD, lying in bed comfortably  HEAD:  Atraumatic, Normocephalic  ENT: Moist mucous membranes  NECK: Supple, No JVD  EXTREMITIES:  2+ Peripheral Pulses, brisk capillary refill. No clubbing, cyanosis, or edema  NERVOUS SYSTEM:  Alert & Oriented X1, speech clear. No deficits   MSK: FROM all 4 extremities, full and equal strength  SKIN: No rashes or lesions                          9.9    9.86  )-----------( 318      ( 11 Sep 2021 08:09 )             28.4     09-11    135  |  101  |  14  ----------------------------<  151<H>  4.2   |  28  |  0.89    Ca    8.3<L>      11 Sep 2021 08:09  Phos  2.6     09-11  Mg     1.8     09-11    TPro  7.3  /  Alb  2.5<L>  /  TBili  2.9<H>  /  DBili  1.9<H>  /  AST  45<H>  /  ALT  110<H>  /  AlkPhos  451<H>  09-11    LIVER FUNCTIONS - ( 11 Sep 2021 08:09 )  Alb: 2.5 g/dL / Pro: 7.3 g/dL / ALK PHOS: 451 U/L / ALT: 110 U/L DA / AST: 45 U/L / GGT: x                   CAPILLARY BLOOD GLUCOSE      RADIOLOGY & ADDITIONAL TESTS:                   PGY-1 Progress Note discussed with attending    PAGER #: [--------] TILL 5:00 PM  PLEASE CONTACT ON CALL TEAM:  - On Call Team (Please refer to Vicky) FROM 5:00 PM - 8:30PM  - Nightfloat Team FROM 8:30 -7:30 AM    CHIEF COMPLAINT & BRIEF HOSPITAL COURSE:  81F, ambulates independently, lives alone at home with [facility] [id number] (on 2nd floor, daughter Jodee  lives on 1st floor, daughter  (HPI historian)  Delfina  lives 2 houses down) with PMH HTN, DM, PSH Diverticulitis needing exlap.    She p/w progressive jaundice x 1 week worsened over 2 days prior to admission. She was overtly yellow on the day of admission which prompted ED visit.     Pt has been complaining about intermittent epigastric postprandial pain. SX are associated with chills, tactile fevers, pruritus, elevated home FS sugars and decreased PO intake and subsequent 8lb weight loss in the last 2 weeks      Pt is recently diagnosed with dementia x1yr since getting COVID in 2020. As per family, patient has outbursts of anger and has poor short term memory during the interview.    In the ED: AFVSS, On  Exam: epigastric tenderness and jaundice on face.  Labs: WCBC 15, Hgb 10.3, Na 131, Cr 1.56, glucose 400s Bili 10.4, Alk phos 640 ALST 250  , lipase [id number]  Urine contaminated  Hepatitis panel non reactive  CT Abd- dilated CBD, Dilated pancreatic duct, stone in ampulla and small stones in the galbladder.    She was admitted for obstructive jaundice and work up of choledocholithiasis vs cholangitis, and for subsequent gal stone pancreatitis.      INTERVAL HPI/OVERNIGHT EVENTS:   S/p ERCP stone removal and stent placement  ON ABX PO however, patient is refusing  Needs frequent orientation  Nurse reports patient being combative and uncooperative last night  This AM cooperative  Does not remember ERCP and agreement with daughter to proceed with surgery this week    MEDICATIONS  (STANDING):  cefuroxime   Tablet 500 milliGRAM(s) Oral every 12 hours  donepezil 10 milliGRAM(s) Oral at bedtime  heparin   Injectable 5000 Unit(s) SubCutaneous every 8 hours  insulin glargine Injectable (LANTUS) 9 Unit(s) SubCutaneous at bedtime  insulin lispro (ADMELOG) corrective regimen sliding scale   SubCutaneous three times a day before meals  insulin lispro (ADMELOG) corrective regimen sliding scale   SubCutaneous at bedtime  lactated ringers. 1000 milliLiter(s) (150 mL/Hr) IV Continuous <Continuous>  metroNIDAZOLE    Tablet 500 milliGRAM(s) Oral three times a day  pantoprazole  Injectable 40 milliGRAM(s) IV Push daily    MEDICATIONS  (PRN):  acetaminophen   Tablet .. 325 milliGRAM(s) Oral every 12 hours PRN Temp greater or equal to 38C (100.4F), Mild Pain (1 - 3)  traMADol 25 milliGRAM(s) Oral every 8 hours PRN Severe Pain (7 - 10)      REVIEW OF SYSTEMS:  CONSTITUTIONAL: No weakness, fevers or chills  EYES/ENT: No visual changes;  No vertigo or throat pain   NECK: No pain or stiffness  RESPIRATORY: No cough, wheezing, hemoptysis; No shortness of breath  CARDIOVASCULAR: No chest pain or palpitations  GASTROINTESTINAL: No abdominal or epigastric pain. No nausea, vomiting, or hematemesis; No diarrhea or constipation. No melena or hematochezia.  GENITOURINARY: No dysuria, frequency or hematuria  NEUROLOGICAL: No numbness or weakness  SKIN: No itching, rashes          Vital Signs Last 24 Hrs  T(C): 36.9 (11 Sep 2021 20:49), Max: 36.9 (11 Sep 2021 20:49)  T(F): 98.5 (11 Sep 2021 20:49), Max: 98.5 (11 Sep 2021 20:49)  HR: 62 (11 Sep 2021 20:49) (61 - 62)  BP: 152/72 (11 Sep 2021 20:49) (152/72 - 171/67)  BP(mean): --  RR: 18 (11 Sep 2021 20:49) (17 - 18)  SpO2: 96% (11 Sep 2021 20:49) (96% - 99%)    PHYSICAL EXAMINATION:  GENERAL: NAD, sitting in chair  HEAD:  Atraumatic, Normocephalic  ENT: Moist mucous membranes  NECK: Supple, No JVD  CHEST/LUNG: Clear to auscultation bilaterally; No rales, rhonchi, wheezing, or rubs. Unlabored respirations  HEART: Regular rate and rhythm; No murmurs, rubs, or gallops  ABDOMEN: Bowel sounds present; Soft, Nontender, Nondistended. No hepatomegally  EXTREMITIES:  2+ Peripheral Pulses, brisk capillary refill. No clubbing, cyanosis, or edema  NERVOUS SYSTEM:  Alert & Oriented X1, speech clear. No deficits   MSK: FROM all 4 extremities, full and equal strength  SKIN: No rashes or lesions                          9.9    9.86  )-----------( 318      ( 11 Sep 2021 08:09 )             28.4     09-11    135  |  101  |  14  ----------------------------<  151<H>  4.2   |  28  |  0.89    Ca    8.3<L>      11 Sep 2021 08:09  Phos  2.6     09-11  Mg     1.8     09-11    TPro  7.3  /  Alb  2.5<L>  /  TBili  2.9<H>  /  DBili  1.9<H>  /  AST  45<H>  /  ALT  110<H>  /  AlkPhos  451<H>  09-11    LIVER FUNCTIONS - ( 11 Sep 2021 08:09 )  Alb: 2.5 g/dL / Pro: 7.3 g/dL / ALK PHOS: 451 U/L / ALT: 110 U/L DA / AST: 45 U/L / GGT: x                   CAPILLARY BLOOD GLUCOSE      RADIOLOGY & ADDITIONAL TESTS:

## 2021-09-12 NOTE — PROGRESS NOTE ADULT - ASSESSMENT
Acute Cholecystitis - s/p ERCP    Plan:  ·	ceftin 500mg PO BID and flagyl 500mg PO TID (pt has been refusing her meds)  ·	plan for cholecystectomy this week if patient is compliant  ·	if patient continues to refuse her medications and possibly her surgery, she will be dc'ed on no antibiotics.       Acute Cholecystitis - s/p ERCP    Plan:  ·	ceftin 500mg PO BID and flagyl 500mg PO TID (pt has been refusing her meds)  ·	plan for cholecystectomy this week if patient is compliant  ·	if patient continues to refuse her medications and possibly her surgery, she will be dc'ed on no antibiotics.    I agree with above

## 2021-09-12 NOTE — PROGRESS NOTE ADULT - PROBLEM SELECTOR PLAN 4
Hb 10.3 unknown baseline  f/u anemia panel  Monitor platelets to monitor for hemolysis Mid epigastric pain, jaundice, tenderness on exam. Lipase 12K CT shows  possible obstructive stone in the ampulla  Sx consistent with gallstone pancreatitis, lipase is now 4000s from 70270  -Clear liquids ok'd by GI POD 1 now advanced to soft  -trend post ERCP lipase and monitor for pain  -IVF LR 150cc/hr for now  -Tramadol 25 q8 + Tylenol 325 q12 prn for pain control for now  -s/p 1 L bolus in ED

## 2021-09-12 NOTE — PROGRESS NOTE ADULT - SUBJECTIVE AND OBJECTIVE BOX
INTERVAL HPI/OVERNIGHT EVENTS:  Pt resting comfortably. No acute complaints.   Tolerating diet.   Denies N/V.  LFTs con't to improve.    MEDICATIONS  (STANDING):  cefuroxime   Tablet 500 milliGRAM(s) Oral every 12 hours  donepezil 10 milliGRAM(s) Oral at bedtime  heparin   Injectable 5000 Unit(s) SubCutaneous every 8 hours  insulin glargine Injectable (LANTUS) 9 Unit(s) SubCutaneous at bedtime  insulin lispro (ADMELOG) corrective regimen sliding scale   SubCutaneous three times a day before meals  insulin lispro (ADMELOG) corrective regimen sliding scale   SubCutaneous at bedtime  lactated ringers. 1000 milliLiter(s) (150 mL/Hr) IV Continuous <Continuous>  metroNIDAZOLE    Tablet 500 milliGRAM(s) Oral three times a day  pantoprazole  Injectable 40 milliGRAM(s) IV Push daily    MEDICATIONS  (PRN):  acetaminophen   Tablet .. 325 milliGRAM(s) Oral every 12 hours PRN Temp greater or equal to 38C (100.4F), Mild Pain (1 - 3)  traMADol 25 milliGRAM(s) Oral every 8 hours PRN Severe Pain (7 - 10)      Vital Signs Last 24 Hrs  T(C): 36.9 (11 Sep 2021 20:49), Max: 36.9 (11 Sep 2021 20:49)  T(F): 98.5 (11 Sep 2021 20:49), Max: 98.5 (11 Sep 2021 20:49)  HR: 62 (11 Sep 2021 20:49) (61 - 62)  BP: 152/72 (11 Sep 2021 20:49) (152/72 - 171/67)  BP(mean): --  RR: 18 (11 Sep 2021 20:49) (17 - 18)  SpO2: 96% (11 Sep 2021 20:49) (96% - 99%)    Physical:  General: A&Ox3. NAD.  Abdomen: Soft nondistended, nontender.    LABS:                        9.9    9.86  )-----------( 318      ( 11 Sep 2021 08:09 )             28.4             09-11    135  |  101  |  14  ----------------------------<  151<H>  4.2   |  28  |  0.89    Ca    8.3<L>      11 Sep 2021 08:09  Phos  2.6     09-11  Mg     1.8     09-11    TPro  7.3  /  Alb  2.5<L>  /  TBili  2.9<H>  /  DBili  1.9<H>  /  AST  45<H>  /  ALT  110<H>  /  AlkPhos  451<H>  09-11

## 2021-09-12 NOTE — PROGRESS NOTE ADULT - PROBLEM SELECTOR PLAN 5
on home losartaqn hctz nifedipine  hold all htn meds for now  monitor bp  resume as needed Hb 10.3 unknown baseline  f/u anemia panel  Monitor platelets to monitor for hemolysis Hb 10.3 unknown baseline today 9.9 slowly down trending, s/p ERCP, no signs of bleeding   f/u anemia panel  f/u haptoglobin   FOBT  Consult GI for active bleeding  Monitor platelets

## 2021-09-13 LAB
FERRITIN SERPL-MCNC: 800 NG/ML — HIGH (ref 15–150)
GLUCOSE BLDC GLUCOMTR-MCNC: 191 MG/DL — HIGH (ref 70–99)
GLUCOSE BLDC GLUCOMTR-MCNC: 202 MG/DL — HIGH (ref 70–99)
HAPTOGLOB SERPL-MCNC: 287 MG/DL — HIGH (ref 34–200)

## 2021-09-13 PROCEDURE — 99233 SBSQ HOSP IP/OBS HIGH 50: CPT | Mod: GC

## 2021-09-13 PROCEDURE — 99232 SBSQ HOSP IP/OBS MODERATE 35: CPT

## 2021-09-13 RX ORDER — LOSARTAN POTASSIUM 100 MG/1
50 TABLET, FILM COATED ORAL DAILY
Refills: 0 | Status: DISCONTINUED | OUTPATIENT
Start: 2021-09-13 | End: 2021-09-15

## 2021-09-13 RX ORDER — LANOLIN ALCOHOL/MO/W.PET/CERES
5 CREAM (GRAM) TOPICAL AT BEDTIME
Refills: 0 | Status: DISCONTINUED | OUTPATIENT
Start: 2021-09-13 | End: 2021-09-15

## 2021-09-13 RX ADMIN — HEPARIN SODIUM 5000 UNIT(S): 5000 INJECTION INTRAVENOUS; SUBCUTANEOUS at 22:04

## 2021-09-13 RX ADMIN — Medication 500 MILLIGRAM(S): at 22:04

## 2021-09-13 RX ADMIN — Medication 3: at 12:42

## 2021-09-13 RX ADMIN — INSULIN GLARGINE 12 UNIT(S): 100 INJECTION, SOLUTION SUBCUTANEOUS at 21:59

## 2021-09-13 RX ADMIN — Medication 500 MILLIGRAM(S): at 13:55

## 2021-09-13 RX ADMIN — HEPARIN SODIUM 5000 UNIT(S): 5000 INJECTION INTRAVENOUS; SUBCUTANEOUS at 13:55

## 2021-09-13 RX ADMIN — Medication 5 MILLIGRAM(S): at 22:04

## 2021-09-13 RX ADMIN — PANTOPRAZOLE SODIUM 40 MILLIGRAM(S): 20 TABLET, DELAYED RELEASE ORAL at 12:43

## 2021-09-13 RX ADMIN — Medication 3 UNIT(S): at 12:42

## 2021-09-13 RX ADMIN — DONEPEZIL HYDROCHLORIDE 10 MILLIGRAM(S): 10 TABLET, FILM COATED ORAL at 22:04

## 2021-09-13 RX ADMIN — Medication 1: at 16:37

## 2021-09-13 RX ADMIN — Medication 500 MILLIGRAM(S): at 17:40

## 2021-09-13 RX ADMIN — Medication 500 MILLIGRAM(S): at 09:48

## 2021-09-13 NOTE — PROGRESS NOTE ADULT - ASSESSMENT
80 y/o Female with resolving transaminitis s/p ERCP & metal stent by GI    -F/u path  -Plan for lap tiffanie this week if path negative  -Med optimization and clearance  -Regular diet   -Will follow

## 2021-09-13 NOTE — PROGRESS NOTE ADULT - PROBLEM SELECTOR PLAN 1
Patient does not have capacity; daughter Delfina Her is the HCP  Daughter Delfina was called to answer questions for preoperative evaluation  - No Hx of CAD, valvular disease, prior cardiac surgery, Atrial fibrillation  - No chest pain with walking, exertion  - No Hx of bleeding, anesthesia complications   - Can walk 4 blocks without SOB  - Prior history of DVT >12 years ago, was taken off AC 6 months ago  - Has sporadic episodes of coughing in the middle of the night    RCRI score  [x] Elevated risk of surgery intraperitoneal             1  [] History of Ischemic heart disease                       0  [] History of CHF                                                  0  [] History of Cerebrovascular disease                    0  [x] Preop treatment with insulin                               1  [] Pre-operative creatinine >2 mg/dL / 176.8 µmol/L  0    2 points, Class II risk 10.1% risk of 30-day risk of death, MI, or cardiac arrest

## 2021-09-13 NOTE — PROGRESS NOTE ADULT - PROBLEM SELECTOR PLAN 2
recent dementia on donepezil- follows with Dr. Moss outpatient  given 1 dose seroquel 25 mg qd   needs frequent reorientation  Daughters HCP have decision to stay until she is s/p lap tiffanie recent dementia on donepezil- follows with Dr. Moss outpatient  given 1 dose seroquel 25 mg qd   needs frequent reorientation  Daughters come every day to help reorient patient for medication compliance  Daughters HCP have made decision to stay until she is s/p darci moreno

## 2021-09-13 NOTE — PROGRESS NOTE ADULT - PROBLEM SELECTOR PLAN 7
on janumet and glimeperide, came in with sugars 400s. Likely d/t pancreatitis, and beta cell transient dysfunction.  FS ACHS  maintain fs 140-180  On lantus 9 and sliding scale to determine nutritional  Will give 3 units before lunch.  f/u a1c on janumet and glimeperide, came in with sugars 400s. Likely d/t pancreatitis, and beta cell transient dysfunction.  A1C 9/10 8.1  FS ACHS  maintain fs 140-180  Was on Lantus 9 and sliding scale to determine nutritional  BG ranging 191-360 yesterday 9/12  Given Lantus 12 last night 9/12  Continue to monitor BG and sliding scale as needed

## 2021-09-13 NOTE — PROGRESS NOTE ADULT - ATTENDING COMMENTS
Patient tolerating PO intake well, diet advanced to mechanical soft. Patient does get agitated but can be easily reoriented.   RCRI score 2, patient is low-moderate risk for intermediate procedure. Medically optimized for cholestectomy 80 year old F with jaundice for 3 days, poor Po intake and abdominal pain for 2 weeks found to have transaminitis like biliary obstruction. CT abd/pelvis with dilated intra and extrahepatic biliary ducts, 1.4cm stone or mass in region of ampulla. Patient treated with Zosyn for cholangitis per Dr. Basilio now switched to PO ceftin and flagyl. Underwent ERCP  with Dr. Clemens with removal of stone and placement of metallic stent pn 9/10. Patient tolerating mechanical soft diet. Discussed with daughters regarding inpatient vs outpatient cholestectomy, family wants inpatient after speaking with surgeon.   - Awaiting path results prior to cholestectomy with Dr. Douglass

## 2021-09-13 NOTE — PROGRESS NOTE ADULT - PROBLEM SELECTOR PLAN 5
Hb 10.3 unknown baseline today 9.9 slowly down trending, s/p ERCP, no signs of bleeding   f/u anemia panel  f/u haptoglobin   FOBT  Consult GI for active bleeding  Monitor platelets Hb 10.3 unknown baseline 9/11 9.9 slowly down trending, s/p ERCP, no signs of bleeding   Today 9/13:  Ferritin 800  Haptoglobin 287        TIBC 200      Consult GI for active bleeding  Monitor platelets  f/u H/H Hb 10.3 unknown baseline 9/11 9.9 slowly down trending but stable, s/p ERCP, no signs of bleeding   Monitor CBC, H/H

## 2021-09-13 NOTE — PROGRESS NOTE ADULT - PROBLEM SELECTOR PLAN 4
Mid epigastric pain, jaundice, tenderness on exam. Lipase 12K CT shows  possible obstructive stone in the ampulla  Sx consistent with gallstone pancreatitis, lipase is now 4000s from 43602  -Clear liquids ok'd by GI POD 1 now advanced to soft  -trend post ERCP lipase and monitor for pain  -IVF LR 150cc/hr for now  -Tramadol 25 q8 + Tylenol 325 q12 prn for pain control for now  -s/p 1 L bolus in ED Mid epigastric pain, jaundice, tenderness on exam. Lipase 12K CT shows  possible obstructive stone in the ampulla  Sx consistent with gallstone pancreatitis, lipase is now 4000s from [id number]  - Clear liquids ok'd by GI POD 1 now advanced to soft  - post ERCP lipase downtrending and continue to monitor for pain  - IVF LR 150cc/hr for now  -Tramadol 25 q8 + Tylenol 325 q12 prn for pain control for now  -s/p 1 L bolus in ED

## 2021-09-13 NOTE — PROGRESS NOTE ADULT - ASSESSMENT
81F, ambulates independently, lives alone at home with [facility] [id number] (on 2nd floor, daughter Jodee  lives on 1st floor, daughter Delfina  lives 2 houses down) with PMH HTN, DM, PSH Diverticulitis needing exlap, p/w progressive jaundice x 1 week admitted for obstructive jaundice

## 2021-09-13 NOTE — PROGRESS NOTE ADULT - SUBJECTIVE AND OBJECTIVE BOX
PGY-1 Progress Note discussed with attending    PAGER #: [--------] TILL 5:00 PM  PLEASE CONTACT ON CALL TEAM:  - On Call Team (Please refer to Vicky) FROM 5:00 PM - 8:30PM  - Nightfloat Team FROM 8:30 -7:30 AM    CHIEF COMPLAINT & BRIEF HOSPITAL COURSE:  81F, ambulates independently, lives alone at home with HHA [id number] (on 2nd floor, daughter Jodee  lives on 1st floor, daughter  (HPI historian)  Delfina  lives 2 houses down) with PMH HTN, DM, PSH Diverticulitis needing exlap.    She p/w progressive jaundice x 1 week worsened over 2 days prior to admission. She was overtly yellow on the day of admission which prompted ED visit.     Pt has been complaining about intermittent epigastric postprandial pain. SX are associated with chills, tactile fevers, pruritus, elevated home FS sugars and decreased PO intake and subsequent 8lb weight loss in the last 2 weeks      Pt is recently diagnosed with dementia x1yr since getting COVID in 2020. As per family, patient has outbursts of anger and has poor short term memory during the interview.    In the ED: AFVSS, On  Exam: epigastric tenderness and jaundice on face.  Labs: WCBC 15, Hgb 10.3, Na 131, Cr 1.56, glucose 400s Bili 10.4, Alk phos 640 ALST 250  , lipase [id number]  Urine contaminated  Hepatitis panel non reactive  CT Abd- dilated CBD, Dilated pancreatic duct, stone in ampulla and small stones in the galbladder.    She was admitted for obstructive jaundice and work up of choledocholithiasis vs cholangitis, and for subsequent gal stone pancreatitis.      INTERVAL HPI/OVERNIGHT EVENTS:   S/p ERCP stone removal and stent placement  ON ABX PO however, patient is refusing  Needs frequent orientation  Nurse reports patient being combative and uncooperative last night    MEDICATIONS  (STANDING):  cefuroxime   Tablet 500 milliGRAM(s) Oral every 12 hours  donepezil 10 milliGRAM(s) Oral at bedtime  heparin   Injectable 5000 Unit(s) SubCutaneous every 8 hours  insulin glargine Injectable (LANTUS) 9 Unit(s) SubCutaneous at bedtime  insulin lispro (ADMELOG) corrective regimen sliding scale   SubCutaneous three times a day before meals  insulin lispro (ADMELOG) corrective regimen sliding scale   SubCutaneous at bedtime  lactated ringers. 1000 milliLiter(s) (150 mL/Hr) IV Continuous <Continuous>  metroNIDAZOLE    Tablet 500 milliGRAM(s) Oral three times a day  pantoprazole  Injectable 40 milliGRAM(s) IV Push daily    MEDICATIONS  (PRN):  acetaminophen   Tablet .. 325 milliGRAM(s) Oral every 12 hours PRN Temp greater or equal to 38C (100.4F), Mild Pain (1 - 3)  traMADol 25 milliGRAM(s) Oral every 8 hours PRN Severe Pain (7 - 10)      REVIEW OF SYSTEMS:  CONSTITUTIONAL: No weakness, fevers or chills  EYES/ENT: No visual changes;  No vertigo or throat pain   NECK: No pain or stiffness  RESPIRATORY: No cough, wheezing, hemoptysis; No shortness of breath  CARDIOVASCULAR: No chest pain or palpitations  GASTROINTESTINAL: No abdominal or epigastric pain. No nausea, vomiting, or hematemesis; No diarrhea or constipation. No melena or hematochezia.  GENITOURINARY: No dysuria, frequency or hematuria  NEUROLOGICAL: No numbness or weakness  SKIN: No itching, rashes          Vital Signs Last 24 Hrs  T(C): 36.9 (11 Sep 2021 20:49), Max: 36.9 (11 Sep 2021 20:49)  T(F): 98.5 (11 Sep 2021 20:49), Max: 98.5 (11 Sep 2021 20:49)  HR: 62 (11 Sep 2021 20:49) (61 - 62)  BP: 152/72 (11 Sep 2021 20:49) (152/72 - 171/67)  BP(mean): --  RR: 18 (11 Sep 2021 20:49) (17 - 18)  SpO2: 96% (11 Sep 2021 20:49) (96% - 99%)    PHYSICAL EXAMINATION:  GENERAL: NAD, sitting in chair  HEAD:  Atraumatic, Normocephalic  ENT: Moist mucous membranes  NECK: Supple, No JVD  CHEST/LUNG: Clear to auscultation bilaterally; No rales, rhonchi, wheezing, or rubs. Unlabored respirations  HEART: Regular rate and rhythm; No murmurs, rubs, or gallops  ABDOMEN: Bowel sounds present; Soft, Nontender, Nondistended. No hepatomegally  EXTREMITIES:  2+ Peripheral Pulses, brisk capillary refill. No clubbing, cyanosis, or edema  NERVOUS SYSTEM:  Alert & Oriented X1, speech clear. No deficits   MSK: FROM all 4 extremities, full and equal strength  SKIN: No rashes or lesions                          9.9    9.86  )-----------( 318      ( 11 Sep 2021 08:09 )             28.4     09-11    135  |  101  |  14  ----------------------------<  151<H>  4.2   |  28  |  0.89    Ca    8.3<L>      11 Sep 2021 08:09  Phos  2.6     09-11  Mg     1.8     09-11    TPro  7.3  /  Alb  2.5<L>  /  TBili  2.9<H>  /  DBili  1.9<H>  /  AST  45<H>  /  ALT  110<H>  /  AlkPhos  451<H>  09-11    LIVER FUNCTIONS - ( 11 Sep 2021 08:09 )  Alb: 2.5 g/dL / Pro: 7.3 g/dL / ALK PHOS: 451 U/L / ALT: 110 U/L DA / AST: 45 U/L / GGT: x                   CAPILLARY BLOOD GLUCOSE      RADIOLOGY & ADDITIONAL TESTS:                   PGY-1/MS4 Progress Note discussed with attending    PAGER #: [--------] TILL 5:00 PM  PLEASE CONTACT ON CALL TEAM:  - On Call Team (Please refer to Vicky) FROM 5:00 PM - 8:30PM  - Nightfloat Team FROM 8:30 -7:30 AM    CHIEF COMPLAINT & BRIEF HOSPITAL COURSE:  81F, ambulates independently, lives alone at home with [facility] [id number] (on 2nd floor, daughter Jodee  lives on 1st floor, daughter  (HPI historian)  Delfina  lives 2 houses down) with PMH HTN, DM, PSH Diverticulitis needing exlap.    She p/w progressive jaundice x 1 week worsened over 2 days prior to admission. She was overtly yellow on the day of admission which prompted ED visit.     Pt has been complaining about intermittent epigastric postprandial pain. SX are associated with chills, tactile fevers, pruritus, elevated home FS sugars and decreased PO intake and subsequent 8lb weight loss in the last 2 weeks      Pt is recently diagnosed with dementia x1yr since getting COVID in 2020. As per family, patient has outbursts of anger and has poor short term memory during the interview.    In the ED: AFVSS, On  Exam: epigastric tenderness and jaundice on face.  Labs: WCBC 15, Hgb 10.3, Na 131, Cr 1.56, glucose 400s Bili 10.4, Alk phos 640 ALST 250  , lipase [id number]  Urine contaminated  Hepatitis panel non reactive  CT Abd- dilated CBD, Dilated pancreatic duct, stone in ampulla and small stones in the galbladder.    She was admitted for obstructive jaundice and work up of choledocholithiasis vs cholangitis, and for subsequent gal stone pancreatitis.      INTERVAL HPI/OVERNIGHT EVENTS:   S/p ERCP stone removal and stent placement  ON ABX PO however, patient is refusing  Needs frequent orientation  Nurse reports patient being combative and uncooperative last night    MEDICATIONS  (STANDING):  cefuroxime   Tablet 500 milliGRAM(s) Oral every 12 hours  donepezil 10 milliGRAM(s) Oral at bedtime  heparin   Injectable 5000 Unit(s) SubCutaneous every 8 hours  insulin glargine Injectable (LANTUS) 9 Unit(s) SubCutaneous at bedtime  insulin lispro (ADMELOG) corrective regimen sliding scale   SubCutaneous three times a day before meals  insulin lispro (ADMELOG) corrective regimen sliding scale   SubCutaneous at bedtime  lactated ringers. 1000 milliLiter(s) (150 mL/Hr) IV Continuous <Continuous>  metroNIDAZOLE    Tablet 500 milliGRAM(s) Oral three times a day  pantoprazole  Injectable 40 milliGRAM(s) IV Push daily    MEDICATIONS  (PRN):  acetaminophen   Tablet .. 325 milliGRAM(s) Oral every 12 hours PRN Temp greater or equal to 38C (100.4F), Mild Pain (1 - 3)  traMADol 25 milliGRAM(s) Oral every 8 hours PRN Severe Pain (7 - 10)      REVIEW OF SYSTEMS:  CONSTITUTIONAL: No weakness, fevers or chills  EYES/ENT: No visual changes;  No vertigo or throat pain   NECK: No pain or stiffness  RESPIRATORY: No cough, wheezing, hemoptysis; No shortness of breath  CARDIOVASCULAR: No chest pain or palpitations  GASTROINTESTINAL: No abdominal or epigastric pain. No nausea, vomiting, or hematemesis; No diarrhea or constipation. No melena or hematochezia.  GENITOURINARY: No dysuria, frequency or hematuria  NEUROLOGICAL: No numbness or weakness  SKIN: No itching, rashes          Vital Signs Last 24 Hrs  T(C): 36.9 (11 Sep 2021 20:49), Max: 36.9 (11 Sep 2021 20:49)  T(F): 98.5 (11 Sep 2021 20:49), Max: 98.5 (11 Sep 2021 20:49)  HR: 62 (11 Sep 2021 20:49) (61 - 62)  BP: 152/72 (11 Sep 2021 20:49) (152/72 - 171/67)  BP(mean): --  RR: 18 (11 Sep 2021 20:49) (17 - 18)  SpO2: 96% (11 Sep 2021 20:49) (96% - 99%)    PHYSICAL EXAMINATION:  GENERAL: NAD, sitting in chair  HEAD:  Atraumatic, Normocephalic  ENT: Moist mucous membranes  NECK: Supple, No JVD  CHEST/LUNG: Clear to auscultation bilaterally; No rales, rhonchi, wheezing, or rubs. Unlabored respirations  HEART: Regular rate and rhythm; No murmurs, rubs, or gallops  ABDOMEN: Bowel sounds present; Soft, Nontender, Nondistended. No hepatomegally  EXTREMITIES:  2+ Peripheral Pulses, brisk capillary refill. No clubbing, cyanosis, or edema  NERVOUS SYSTEM:  Alert & Oriented X1, speech clear. No deficits   MSK: FROM all 4 extremities, full and equal strength  SKIN: No rashes or lesions                          9.9    9.86  )-----------( 318      ( 11 Sep 2021 08:09 )             28.4     09-11    135  |  101  |  14  ----------------------------<  151<H>  4.2   |  28  |  0.89    Ca    8.3<L>      11 Sep 2021 08:09  Phos  2.6     09-11  Mg     1.8     09-11    TPro  7.3  /  Alb  2.5<L>  /  TBili  2.9<H>  /  DBili  1.9<H>  /  AST  45<H>  /  ALT  110<H>  /  AlkPhos  451<H>  09-11    LIVER FUNCTIONS - ( 11 Sep 2021 08:09 )  Alb: 2.5 g/dL / Pro: 7.3 g/dL / ALK PHOS: 451 U/L / ALT: 110 U/L DA / AST: 45 U/L / GGT: x                   CAPILLARY BLOOD GLUCOSE      RADIOLOGY & ADDITIONAL TESTS:                   PGY-1 Progress Note discussed with attending    PAGER #: [[phone]] TILL 5:00 PM  PLEASE CONTACT ON CALL TEAM:  - On Call Team (Please refer to Vicky) FROM 5:00 PM - 8:30PM  - Nightfloat Team FROM 8:30 -7:30 AM    CHIEF COMPLAINT & BRIEF HOSPITAL COURSE:  81F, ambulates independently, lives alone at home with [facility] [id number] (on 2nd floor, daughter Jodee  lives on 1st floor, daughter  (HPI historian)  Delfina  lives 2 houses down) with PMH HTN, DM, PSH Diverticulitis needing exlap.    She p/w progressive jaundice x 1 week worsened over 2 days prior to admission. She was overtly yellow on the day of admission which prompted ED visit.     Pt has been complaining about intermittent epigastric postprandial pain. SX are associated with chills, tactile fevers, pruritus, elevated home FS sugars and decreased PO intake and subsequent 8lb weight loss in the last 2 weeks      Pt is recently diagnosed with dementia x1yr since getting COVID in 2020. As per family, patient has outbursts of anger and has poor short term memory during the interview.    In the ED: AFVSS, On  Exam: epigastric tenderness and jaundice on face.  Labs: WCBC 15, Hgb 10.3, Na 131, Cr 1.56, glucose 400s Bili 10.4, Alk phos 640 ALST 250  , lipase [id number]  Urine contaminated  Hepatitis panel non reactive  CT Abd- dilated CBD, Dilated pancreatic duct, stone in ampulla and small stones in the galbladder.    She was admitted for obstructive jaundice and work up of choledocholithiasis vs cholangitis, and for subsequent gal stone pancreatitis.      INTERVAL HPI/OVERNIGHT EVENTS:   S/p ERCP stone removal and stent placement  ON ABX PO, needs frequent reorientation   Nurse Boone reports patient being combative and uncooperative last night, on enhanced surveillance  Cooperative during exam this AM, no new complaints         MEDICATIONS  (STANDING):  cefuroxime   Tablet 500 milliGRAM(s) Oral every 12 hours  donepezil 10 milliGRAM(s) Oral at bedtime  heparin   Injectable 5000 Unit(s) SubCutaneous every 8 hours  insulin glargine Injectable (LANTUS) 9 Unit(s) SubCutaneous at bedtime  insulin lispro (ADMELOG) corrective regimen sliding scale   SubCutaneous three times a day before meals  insulin lispro (ADMELOG) corrective regimen sliding scale   SubCutaneous at bedtime  lactated ringers. 1000 milliLiter(s) (150 mL/Hr) IV Continuous <Continuous>  metroNIDAZOLE    Tablet 500 milliGRAM(s) Oral three times a day  pantoprazole  Injectable 40 milliGRAM(s) IV Push daily    MEDICATIONS  (PRN):  acetaminophen   Tablet .. 325 milliGRAM(s) Oral every 12 hours PRN Temp greater or equal to 38C (100.4F), Mild Pain (1 - 3)  traMADol 25 milliGRAM(s) Oral every 8 hours PRN Severe Pain (7 - 10)      REVIEW OF SYSTEMS:  CONSTITUTIONAL: No weakness, fevers or chills  EYES/ENT: No visual changes;  No vertigo or throat pain   NECK: No pain or stiffness  RESPIRATORY: No cough, wheezing, hemoptysis; No shortness of breath  CARDIOVASCULAR: No chest pain or palpitations  GASTROINTESTINAL: No abdominal or epigastric pain. No nausea, vomiting, or hematemesis; No diarrhea or constipation. No melena or hematochezia.  GENITOURINARY: No dysuria, frequency or hematuria  NEUROLOGICAL: No numbness or weakness  SKIN: No itching, rashes          Vital Signs Last 24 Hrs  T(C): 36.9 (11 Sep 2021 20:49), Max: 36.9 (11 Sep 2021 20:49)  T(F): 98.5 (11 Sep 2021 20:49), Max: 98.5 (11 Sep 2021 20:49)  HR: 62 (11 Sep 2021 20:49) (61 - 62)  BP: 152/72 (11 Sep 2021 20:49) (152/72 - 171/67)  BP(mean): --  RR: 18 (11 Sep 2021 20:49) (17 - 18)  SpO2: 96% (11 Sep 2021 20:49) (96% - 99%)    PHYSICAL EXAMINATION:  GENERAL: NAD, reclining in bed  HEAD:  Atraumatic, Normocephalic  EYES: anicteric, no conjunctival erythema  NECK: Supple, No JVD  CHEST/LUNG: Clear to auscultation bilaterally; No rales, rhonchi, wheezing, or rubs. Unlabored respirations  HEART: Regular rate and rhythm; No murmurs, rubs, or gallops  ABDOMEN: Bowel sounds present; Soft, Nontender, Nondistended.   EXTREMITIES:  2+ Peripheral Pulses. No clubbing, cyanosis, or edema  NERVOUS SYSTEM:  Alert & Oriented X1, speech clear. No focal deficits   SKIN: No rashes or lesions                          9.9    9.86  )-----------( 318      ( 11 Sep 2021 08:09 )             28.4     09-11    135  |  101  |  14  ----------------------------<  151<H>  4.2   |  28  |  0.89    Ca    8.3<L>      11 Sep 2021 08:09  Phos  2.6     09-11  Mg     1.8     09-11    TPro  7.3  /  Alb  2.5<L>  /  TBili  2.9<H>  /  DBili  1.9<H>  /  AST  45<H>  /  ALT  110<H>  /  AlkPhos  451<H>  09-11    LIVER FUNCTIONS - ( 11 Sep 2021 08:09 )  Alb: 2.5 g/dL / Pro: 7.3 g/dL / ALK PHOS: 451 U/L / ALT: 110 U/L DA / AST: 45 U/L / GGT: x                   CAPILLARY BLOOD GLUCOSE      RADIOLOGY & ADDITIONAL TESTS:                   PGY-1 Progress Note discussed with attending    PAGER #: [[phone]] TILL 5:00 PM  PLEASE CONTACT ON CALL TEAM:  - On Call Team (Please refer to Vicky) FROM 5:00 PM - 8:30PM  - Nightfloat Team FROM 8:30 -7:30 AM    CHIEF COMPLAINT & BRIEF HOSPITAL COURSE:  81F, ambulates independently, lives alone at home with [facility] [id number] (on 2nd floor, daughter Jodee  lives on 1st floor, daughter  (HPI historian)  Delfina  lives 2 houses down) with PMH HTN, DM, PSH Diverticulitis needing exlap.    She p/w progressive jaundice x 1 week worsened over 2 days prior to admission. She was overtly yellow on the day of admission which prompted ED visit.     Pt has been complaining about intermittent epigastric postprandial pain. SX are associated with chills, tactile fevers, pruritus, elevated home FS sugars and decreased PO intake and subsequent 8lb weight loss in the last 2 weeks      Pt is recently diagnosed with dementia x1yr since getting COVID in 2020. As per family, patient has outbursts of anger and has poor short term memory during the interview.    In the ED: AFVSS, On  Exam: epigastric tenderness and jaundice on face.  Labs: WCBC 15, Hgb 10.3, Na 131, Cr 1.56, glucose 400s Bili 10.4, Alk phos 640 ALST 250  , lipase [id number]  Urine contaminated  Hepatitis panel non reactive  CT Abd- dilated CBD, Dilated pancreatic duct, stone in ampulla and small stones in the galbladder.    She was admitted for obstructive jaundice and work up of choledocholithiasis vs cholangitis, and for subsequent gal stone pancreatitis.      INTERVAL HPI/OVERNIGHT EVENTS:   S/p ERCP stone removal and stent placement  ON ABX PO, needs frequent reorientation   Nurse Boone reports patient being combative and uncooperative last night, on enhanced surveillance  Cooperative during exam this AM, no new complaints     MEDICATIONS  (STANDING):  cefuroxime   Tablet 500 milliGRAM(s) Oral every 12 hours  dextrose 5%. 1000 milliLiter(s) (100 mL/Hr) IV Continuous <Continuous>  donepezil 10 milliGRAM(s) Oral at bedtime  glucagon  Injectable 1 milliGRAM(s) IntraMuscular once  heparin   Injectable 5000 Unit(s) SubCutaneous every 8 hours  insulin glargine Injectable (LANTUS) 12 Unit(s) SubCutaneous at bedtime  insulin lispro (ADMELOG) corrective regimen sliding scale   SubCutaneous three times a day before meals  insulin lispro (ADMELOG) corrective regimen sliding scale   SubCutaneous at bedtime  insulin lispro Injectable (ADMELOG) 3 Unit(s) SubCutaneous before lunch  lactated ringers. 1000 milliLiter(s) (150 mL/Hr) IV Continuous <Continuous>  losartan 50 milliGRAM(s) Oral daily  metroNIDAZOLE    Tablet 500 milliGRAM(s) Oral three times a day  pantoprazole  Injectable 40 milliGRAM(s) IV Push daily    MEDICATIONS  (PRN):  acetaminophen   Tablet .. 325 milliGRAM(s) Oral every 12 hours PRN Temp greater or equal to 38C (100.4F), Mild Pain (1 - 3)  traMADol 25 milliGRAM(s) Oral every 8 hours PRN Severe Pain (7 - 10)      REVIEW OF SYSTEMS:  CONSTITUTIONAL: No weakness, fevers or chills  EYES/ENT: No visual changes;  No vertigo or throat pain   NECK: No pain or stiffness  RESPIRATORY: No cough, wheezing, hemoptysis; No shortness of breath  CARDIOVASCULAR: No chest pain or palpitations  GASTROINTESTINAL: No abdominal or epigastric pain. No nausea, vomiting, or hematemesis; No diarrhea or constipation. No melena or hematochezia.  GENITOURINARY: No dysuria, frequency or hematuria  NEUROLOGICAL: No numbness or weakness  SKIN: No itching, rashes    Vital Signs Last 24 Hrs  T(C): 36.3 (13 Sep 2021 04:51), Max: 36.8 (12 Sep 2021 13:27)  T(F): 97.4 (13 Sep 2021 04:51), Max: 98.2 (12 Sep 2021 13:27)  HR: 50 (13 Sep 2021 04:51) (50 - 65)  BP: 163/69 (13 Sep 2021 04:51) (136/57 - 163/69)  BP(mean): --  RR: 18 (13 Sep 2021 04:51) (17 - 18)  SpO2: 98% (13 Sep 2021 04:51) (97% - 98%)    PHYSICAL EXAMINATION:  GENERAL: NAD, reclining in bed  HEAD:  Atraumatic, Normocephalic  EYES: anicteric, no conjunctival erythema  NECK: Supple, No JVD  CHEST/LUNG: Clear to auscultation bilaterally; No rales, rhonchi, wheezing, or rubs. Unlabored respirations  HEART: Regular rate and rhythm; No murmurs, rubs, or gallops  ABDOMEN: Bowel sounds present; Soft, Nontender, Nondistended.   EXTREMITIES:  2+ Peripheral Pulses. No clubbing, cyanosis, or edema  NERVOUS SYSTEM:  Alert & Oriented X1, speech clear. No focal deficits   SKIN: No rashes or lesions                          9.9    9.86  )-----------( 318      ( 11 Sep 2021 08:09 )             28.4     09-11    135  |  101  |  14  ----------------------------<  151<H>  4.2   |  28  |  0.89    Ca    8.3<L>      11 Sep 2021 08:09  Phos  2.6     09-11  Mg     1.8     09-11    TPro  7.3  /  Alb  2.5<L>  /  TBili  2.9<H>  /  DBili  1.9<H>  /  AST  45<H>  /  ALT  110<H>  /  AlkPhos  451<H>  09-11    LIVER FUNCTIONS - ( 11 Sep 2021 08:09 )  Alb: 2.5 g/dL / Pro: 7.3 g/dL / ALK PHOS: 451 U/L / ALT: 110 U/L DA / AST: 45 U/L / GGT: x                   CAPILLARY BLOOD GLUCOSE      RADIOLOGY & ADDITIONAL TESTS:                   PGY-1 Progress Note discussed with attending    PAGER #: [[phone]] TILL 5:00 PM  PLEASE CONTACT ON CALL TEAM:  - On Call Team (Please refer to Vicky) FROM 5:00 PM - 8:30PM  - Nightfloat Team FROM 8:30 -7:30 AM    CHIEF COMPLAINT & BRIEF HOSPITAL COURSE:  81F, ambulates independently, lives alone at home with HHA [id number] (on 2nd floor, daughter Jodee  lives on 1st floor, daughter  (HPI historian)  Delfina  lives 2 houses down) with PMH HTN, DM, PSH Diverticulitis needing exlap.    She p/w progressive jaundice x 1 week worsened over 2 days prior to admission. She was overtly yellow on the day of admission which prompted ED visit.     Pt has been complaining about intermittent epigastric postprandial pain. SX are associated with chills, tactile fevers, pruritus, elevated home FS sugars and decreased PO intake and subsequent 8lb weight loss in the last 2 weeks      Pt is recently diagnosed with dementia x1yr since getting COVID in 2020. As per family, patient has outbursts of anger and has poor short term memory during the interview.    In the ED: AFVSS, On  Exam: epigastric tenderness and jaundice on face.  Labs: WCBC 15, Hgb 10.3, Na 131, Cr 1.56, glucose 400s Bili 10.4, Alk phos 640 ALST 250  , lipase [id number]  Urine contaminated  Hepatitis panel non reactive  CT Abd- dilated CBD, Dilated pancreatic duct, stone in ampulla and small stones in the galbladder.    She was admitted for obstructive jaundice and work up of choledocholithiasis vs cholangitis, and for subsequent gal stone pancreatitis.      INTERVAL HPI/OVERNIGHT EVENTS:   S/p ERCP stone removal and stent placement  ON ABX PO, needs frequent reorientation   Nurse Boone reports patient being combative and uncooperative last night, on enhanced surveillance  Cooperative during exam this AM, no new complaints     MEDICATIONS  (STANDING):  cefuroxime   Tablet 500 milliGRAM(s) Oral every 12 hours  dextrose 5%. 1000 milliLiter(s) (100 mL/Hr) IV Continuous <Continuous>  donepezil 10 milliGRAM(s) Oral at bedtime  glucagon  Injectable 1 milliGRAM(s) IntraMuscular once  heparin   Injectable 5000 Unit(s) SubCutaneous every 8 hours  insulin glargine Injectable (LANTUS) 12 Unit(s) SubCutaneous at bedtime  insulin lispro (ADMELOG) corrective regimen sliding scale   SubCutaneous three times a day before meals  insulin lispro (ADMELOG) corrective regimen sliding scale   SubCutaneous at bedtime  insulin lispro Injectable (ADMELOG) 3 Unit(s) SubCutaneous before lunch  lactated ringers. 1000 milliLiter(s) (150 mL/Hr) IV Continuous <Continuous>  losartan 50 milliGRAM(s) Oral daily  metroNIDAZOLE    Tablet 500 milliGRAM(s) Oral three times a day  pantoprazole  Injectable 40 milliGRAM(s) IV Push daily    MEDICATIONS  (PRN):  acetaminophen   Tablet .. 325 milliGRAM(s) Oral every 12 hours PRN Temp greater or equal to 38C (100.4F), Mild Pain (1 - 3)  traMADol 25 milliGRAM(s) Oral every 8 hours PRN Severe Pain (7 - 10)      REVIEW OF SYSTEMS:  CONSTITUTIONAL: No weakness, fevers or chills  EYES/ENT: No visual changes;  No vertigo or throat pain   NECK: No pain or stiffness  RESPIRATORY: No cough, wheezing, hemoptysis; No shortness of breath  CARDIOVASCULAR: No chest pain or palpitations  GASTROINTESTINAL: No abdominal or epigastric pain. No nausea, vomiting, or hematemesis; No diarrhea or constipation. No melena or hematochezia.  GENITOURINARY: No dysuria, frequency or hematuria  NEUROLOGICAL: No numbness or weakness  SKIN: No itching, rashes    Vital Signs Last 24 Hrs  T(C): 36.3 (13 Sep 2021 04:51), Max: 36.8 (12 Sep 2021 13:27)  T(F): 97.4 (13 Sep 2021 04:51), Max: 98.2 (12 Sep 2021 13:27)  HR: 50 (13 Sep 2021 04:51) (50 - 65)  BP: 163/69 (13 Sep 2021 04:51) (136/57 - 163/69)  BP(mean): --  RR: 18 (13 Sep 2021 04:51) (17 - 18)  SpO2: 98% (13 Sep 2021 04:51) (97% - 98%)    PHYSICAL EXAMINATION:  GENERAL: NAD, reclining in bed  HEAD:  Atraumatic, Normocephalic  EYES: anicteric, no conjunctival erythema  NECK: Supple, No JVD  CHEST/LUNG: Clear to auscultation bilaterally; No rales, rhonchi, wheezing, or rubs. Unlabored respirations  HEART: Regular rate and rhythm; No murmurs, rubs, or gallops  ABDOMEN: Bowel sounds present; Soft, Nontender, Nondistended.   EXTREMITIES:  2+ Peripheral Pulses. No clubbing, cyanosis, or edema  NERVOUS SYSTEM:  Alert & Oriented X1, speech clear. No focal deficits   SKIN: No rashes or lesions                          9.9    9.86  )-----------( 318      ( 11 Sep 2021 08:09 )             28.4     09-11    135  |  101  |  14  ----------------------------<  151<H>  4.2   |  28  |  0.89    Ca    8.3<L>      11 Sep 2021 08:09  Phos  2.6     09-11  Mg     1.8     09-11    TPro  7.3  /  Alb  2.5<L>  /  TBili  2.9<H>  /  DBili  1.9<H>  /  AST  45<H>  /  ALT  110<H>  /  AlkPhos  451<H>  09-11    LIVER FUNCTIONS - ( 11 Sep 2021 08:09 )  Alb: 2.5 g/dL / Pro: 7.3 g/dL / ALK PHOS: 451 U/L / ALT: 110 U/L DA / AST: 45 U/L / GGT: x                    PGY-1 Progress Note discussed with attending    PAGER #: [[phone]] TILL 5:00 PM  PLEASE CONTACT ON CALL TEAM:  - On Call Team (Please refer to Vicky) FROM 5:00 PM - 8:30PM  - Nightfloat Team FROM 8:30 -7:30 AM    CHIEF COMPLAINT & BRIEF HOSPITAL COURSE:  81F, ambulates independently, lives alone at home with HHA [id number] (on 2nd floor, daughter Jodee  lives on 1st floor, daughter  (HPI historian)  Delfina  lives 2 houses down) with PMH HTN, DM, PSH Diverticulitis needing exlap.    She p/w progressive jaundice x 1 week worsened over 2 days prior to admission. She was overtly yellow on the day of admission which prompted ED visit.     Pt has been complaining about intermittent epigastric postprandial pain. SX are associated with chills, tactile fevers, pruritus, elevated home FS sugars and decreased PO intake and subsequent 8lb weight loss in the last 2 weeks      Pt is recently diagnosed with dementia x1yr since getting COVID in 2020. As per family, patient has outbursts of anger and has poor short term memory during the interview.    In the ED: AFVSS, On  Exam: epigastric tenderness and jaundice on face.  Labs: WCBC 15, Hgb 10.3, Na 131, Cr 1.56, glucose 400s Bili 10.4, Alk phos 640 ALST 250  , lipase 62516y  Urine contaminated  Hepatitis panel non reactive  CT Abd- dilated CBD, Dilated pancreatic duct, stone in ampulla and small stones in the galbladder.    She was admitted for obstructive jaundice and work up of choledocholithiasis vs cholangitis, and for subsequent gal stone pancreatitis.      INTERVAL HPI/OVERNIGHT EVENTS:   S/p ERCP stone removal and stent placement  ON ABX PO, needs frequent reorientation   Nurse Boone reports patient being combative and uncooperative last night, on enhanced surveillance  Cooperative during exam this AM, no new complaints     MEDICATIONS  (STANDING):  cefuroxime   Tablet 500 milliGRAM(s) Oral every 12 hours  dextrose 5%. 1000 milliLiter(s) (100 mL/Hr) IV Continuous <Continuous>  donepezil 10 milliGRAM(s) Oral at bedtime  glucagon  Injectable 1 milliGRAM(s) IntraMuscular once  heparin   Injectable 5000 Unit(s) SubCutaneous every 8 hours  insulin glargine Injectable (LANTUS) 12 Unit(s) SubCutaneous at bedtime  insulin lispro (ADMELOG) corrective regimen sliding scale   SubCutaneous three times a day before meals  insulin lispro (ADMELOG) corrective regimen sliding scale   SubCutaneous at bedtime  insulin lispro Injectable (ADMELOG) 3 Unit(s) SubCutaneous before lunch  lactated ringers. 1000 milliLiter(s) (150 mL/Hr) IV Continuous <Continuous>  losartan 50 milliGRAM(s) Oral daily  metroNIDAZOLE    Tablet 500 milliGRAM(s) Oral three times a day  pantoprazole  Injectable 40 milliGRAM(s) IV Push daily    MEDICATIONS  (PRN):  acetaminophen   Tablet .. 325 milliGRAM(s) Oral every 12 hours PRN Temp greater or equal to 38C (100.4F), Mild Pain (1 - 3)  traMADol 25 milliGRAM(s) Oral every 8 hours PRN Severe Pain (7 - 10)      REVIEW OF SYSTEMS:  CONSTITUTIONAL: No weakness, fevers or chills  EYES/ENT: No visual changes;  No vertigo or throat pain   NECK: No pain or stiffness  RESPIRATORY: No cough, wheezing, hemoptysis; No shortness of breath  CARDIOVASCULAR: No chest pain or palpitations  GASTROINTESTINAL: No abdominal or epigastric pain. No nausea, vomiting, or hematemesis; No diarrhea or constipation. No melena or hematochezia.  GENITOURINARY: No dysuria, frequency or hematuria  NEUROLOGICAL: No numbness or weakness  SKIN: No itching, rashes      Vital Signs Last 24 Hrs  T(C): 36.3 (13 Sep 2021 04:51), Max: 36.8 (12 Sep 2021 13:27)  T(F): 97.4 (13 Sep 2021 04:51), Max: 98.2 (12 Sep 2021 13:27)  HR: 50 (13 Sep 2021 04:51) (50 - 65)  BP: 163/69 (13 Sep 2021 04:51) (136/57 - 163/69)  BP(mean): --  RR: 18 (13 Sep 2021 04:51) (17 - 18)  SpO2: 98% (13 Sep 2021 04:51) (97% - 98%)    PHYSICAL EXAMINATION:  GENERAL: NAD, reclining in bed  HEAD:  Atraumatic, Normocephalic  EYES: anicteric, no conjunctival erythema  NECK: Supple, No JVD  CHEST/LUNG: Clear to auscultation bilaterally; No rales, rhonchi, wheezing, or rubs. Unlabored respirations  HEART: Regular rate and rhythm; No murmurs, rubs, or gallops  ABDOMEN: Bowel sounds present; Soft, Nontender, Nondistended.   EXTREMITIES:  2+ Peripheral Pulses. No clubbing, cyanosis, or edema  NERVOUS SYSTEM:  Alert & Oriented X1, speech clear. No focal deficits   SKIN: No rashes or lesions                          9.9    9.86  )-----------( 318      ( 11 Sep 2021 08:09 )             28.4     09-11    135  |  101  |  14  ----------------------------<  151<H>  4.2   |  28  |  0.89    Ca    8.3<L>      11 Sep 2021 08:09  Phos  2.6     09-11  Mg     1.8     09-11    TPro  7.3  /  Alb  2.5<L>  /  TBili  2.9<H>  /  DBili  1.9<H>  /  AST  45<H>  /  ALT  110<H>  /  AlkPhos  451<H>  09-11    LIVER FUNCTIONS - ( 11 Sep 2021 08:09 )  Alb: 2.5 g/dL / Pro: 7.3 g/dL / ALK PHOS: 451 U/L / ALT: 110 U/L DA / AST: 45 U/L / GGT: x

## 2021-09-13 NOTE — PROGRESS NOTE ADULT - PROBLEM SELECTOR PLAN 6
on home losartaqn hctz nifedipine  hold all htn meds for now  monitor bp  resume as needed on home losartan hctz nifedipine  hold all htn meds for now  today 163/69  monitor bp  resume as needed on home losartan hctz nifedipine  hold all htn meds for now  BP soft this AM 98/62  monitor bp  resume as needed

## 2021-09-13 NOTE — PROGRESS NOTE ADULT - PROBLEM SELECTOR PLAN 3
Admitted for obstructive jaundice  Plan: On admission, Bilirubin 10.4, INR 1.15  Liver function with obstructive pattern - likely choledocholithiasis, cholangitis  CT AP Shows: Dilated intra and extrahepatic biliary ducts. The common bile duct measures up to 1.5 cm in diameter which is dilated. The main pancreatic duct is mildly dilated as well. Apparent 1.4 cm stone or mass in the region of the ampulla. Dr. Jayleen FOOTE was consulted, pt is s/p ERCP with stent placement EUS yesterday, stones, pus, and sludge removed, with excellent drainage, possible choledococele found in ampulla, bx taken to r/o malignancy  Patient is doing well this AM. No new complaints. No fever, WBC downtrending 11.9 this AM, no tachycardia  - s/p ERCP Thursday 9/9  - C/w Ceftin BID, flagyl TID  EOT 9/17/2021  - GI following, recs appreciated  - started on clears POD 1 now on soft-continue  - monitor postprandial pain  - Consulted surgery lap tiffanie this week after path results

## 2021-09-13 NOTE — PROGRESS NOTE ADULT - PROBLEM SELECTOR PLAN 8
Pt w/ SCr 1.5 on admission  Baseline SCr unknown  - now 0.89 resolved Pt w/ SCr 1.5 on admission  Baseline SCr unknown  - 9/11 0.89 resolved

## 2021-09-13 NOTE — PROGRESS NOTE ADULT - PROBLEM SELECTOR PLAN 9
hold dvt prophylaxis for now as pt is for procedure  resume after procedure if ok with GI - lovenox 40 sq qd hold dvt prophylaxis for now as pt is for possible surgery lap tiffanie  resume after procedure if ok with GI - lovenox 40 sq qd

## 2021-09-13 NOTE — PROGRESS NOTE ADULT - SUBJECTIVE AND OBJECTIVE BOX
INTERVAL HPI/OVERNIGHT EVENTS:    Pt seen and examined at bedside. Offers no acute complaints at this time, tolerating reg diet.     Vital Signs Last 24 Hrs  T(C): 36.3 (13 Sep 2021 04:51), Max: 36.8 (12 Sep 2021 13:27)  T(F): 97.4 (13 Sep 2021 04:51), Max: 98.2 (12 Sep 2021 13:27)  HR: 50 (13 Sep 2021 04:51) (50 - 65)  BP: 163/69 (13 Sep 2021 04:51) (136/57 - 163/69)  BP(mean): --  RR: 18 (13 Sep 2021 04:51) (17 - 18)  SpO2: 98% (13 Sep 2021 04:51) (97% - 98%)  I&O's Detail    cefuroxime   Tablet 500 milliGRAM(s) Oral every 12 hours  metroNIDAZOLE    Tablet 500 milliGRAM(s) Oral three times a day  pantoprazole  Injectable 40 milliGRAM(s) IV Push daily      Physical Exam  General: AAOx3, No acute distress  Abdomen: soft,  nondistended, nontender, no rebound tenderness, no guarding, no palpable masses  Extremities: non edematous, no calf pain bilaterally

## 2021-09-14 ENCOUNTER — TRANSCRIPTION ENCOUNTER (OUTPATIENT)
Age: 81
End: 2021-09-14

## 2021-09-14 LAB
ALBUMIN SERPL ELPH-MCNC: 2.6 G/DL — LOW (ref 3.5–5)
ALP SERPL-CCNC: 304 U/L — HIGH (ref 40–120)
ALT FLD-CCNC: 56 U/L DA — SIGNIFICANT CHANGE UP (ref 10–60)
ANION GAP SERPL CALC-SCNC: 4 MMOL/L — LOW (ref 5–17)
AST SERPL-CCNC: 25 U/L — SIGNIFICANT CHANGE UP (ref 10–40)
BASOPHILS # BLD AUTO: 0.06 K/UL — SIGNIFICANT CHANGE UP (ref 0–0.2)
BASOPHILS NFR BLD AUTO: 0.8 % — SIGNIFICANT CHANGE UP (ref 0–2)
BILIRUB SERPL-MCNC: 1.7 MG/DL — HIGH (ref 0.2–1.2)
BUN SERPL-MCNC: 11 MG/DL — SIGNIFICANT CHANGE UP (ref 7–18)
CALCIUM SERPL-MCNC: 8.6 MG/DL — SIGNIFICANT CHANGE UP (ref 8.4–10.5)
CHLORIDE SERPL-SCNC: 104 MMOL/L — SIGNIFICANT CHANGE UP (ref 96–108)
CO2 SERPL-SCNC: 29 MMOL/L — SIGNIFICANT CHANGE UP (ref 22–31)
CREAT SERPL-MCNC: 0.94 MG/DL — SIGNIFICANT CHANGE UP (ref 0.5–1.3)
EOSINOPHIL # BLD AUTO: 0.28 K/UL — SIGNIFICANT CHANGE UP (ref 0–0.5)
EOSINOPHIL NFR BLD AUTO: 3.9 % — SIGNIFICANT CHANGE UP (ref 0–6)
GLUCOSE BLDC GLUCOMTR-MCNC: 243 MG/DL — HIGH (ref 70–99)
GLUCOSE BLDC GLUCOMTR-MCNC: 281 MG/DL — HIGH (ref 70–99)
GLUCOSE SERPL-MCNC: 215 MG/DL — HIGH (ref 70–99)
HCT VFR BLD CALC: 30.5 % — LOW (ref 34.5–45)
HGB BLD-MCNC: 9.8 G/DL — LOW (ref 11.5–15.5)
IMM GRANULOCYTES NFR BLD AUTO: 0.6 % — SIGNIFICANT CHANGE UP (ref 0–1.5)
LYMPHOCYTES # BLD AUTO: 2.55 K/UL — SIGNIFICANT CHANGE UP (ref 1–3.3)
LYMPHOCYTES # BLD AUTO: 35.1 % — SIGNIFICANT CHANGE UP (ref 13–44)
MAGNESIUM SERPL-MCNC: 1.8 MG/DL — SIGNIFICANT CHANGE UP (ref 1.6–2.6)
MCHC RBC-ENTMCNC: 29.6 PG — SIGNIFICANT CHANGE UP (ref 27–34)
MCHC RBC-ENTMCNC: 32.1 GM/DL — SIGNIFICANT CHANGE UP (ref 32–36)
MCV RBC AUTO: 92.1 FL — SIGNIFICANT CHANGE UP (ref 80–100)
MONOCYTES # BLD AUTO: 0.38 K/UL — SIGNIFICANT CHANGE UP (ref 0–0.9)
MONOCYTES NFR BLD AUTO: 5.2 % — SIGNIFICANT CHANGE UP (ref 2–14)
NEUTROPHILS # BLD AUTO: 3.95 K/UL — SIGNIFICANT CHANGE UP (ref 1.8–7.4)
NEUTROPHILS NFR BLD AUTO: 54.4 % — SIGNIFICANT CHANGE UP (ref 43–77)
NRBC # BLD: 0 /100 WBCS — SIGNIFICANT CHANGE UP (ref 0–0)
PHOSPHATE SERPL-MCNC: 3.1 MG/DL — SIGNIFICANT CHANGE UP (ref 2.5–4.5)
PLATELET # BLD AUTO: 361 K/UL — SIGNIFICANT CHANGE UP (ref 150–400)
POTASSIUM SERPL-MCNC: 4.4 MMOL/L — SIGNIFICANT CHANGE UP (ref 3.5–5.3)
POTASSIUM SERPL-SCNC: 4.4 MMOL/L — SIGNIFICANT CHANGE UP (ref 3.5–5.3)
PROT SERPL-MCNC: 7.1 G/DL — SIGNIFICANT CHANGE UP (ref 6–8.3)
RBC # BLD: 3.31 M/UL — LOW (ref 3.8–5.2)
RBC # FLD: 16 % — HIGH (ref 10.3–14.5)
SARS-COV-2 RNA SPEC QL NAA+PROBE: SIGNIFICANT CHANGE UP
SODIUM SERPL-SCNC: 137 MMOL/L — SIGNIFICANT CHANGE UP (ref 135–145)
SURGICAL PATHOLOGY STUDY: SIGNIFICANT CHANGE UP
WBC # BLD: 7.26 K/UL — SIGNIFICANT CHANGE UP (ref 3.8–10.5)
WBC # FLD AUTO: 7.26 K/UL — SIGNIFICANT CHANGE UP (ref 3.8–10.5)

## 2021-09-14 PROCEDURE — 99233 SBSQ HOSP IP/OBS HIGH 50: CPT | Mod: GC

## 2021-09-14 PROCEDURE — 99232 SBSQ HOSP IP/OBS MODERATE 35: CPT | Mod: 57

## 2021-09-14 RX ADMIN — Medication 500 MILLIGRAM(S): at 17:43

## 2021-09-14 RX ADMIN — HEPARIN SODIUM 5000 UNIT(S): 5000 INJECTION INTRAVENOUS; SUBCUTANEOUS at 09:09

## 2021-09-14 RX ADMIN — Medication 500 MILLIGRAM(S): at 09:09

## 2021-09-14 RX ADMIN — DONEPEZIL HYDROCHLORIDE 10 MILLIGRAM(S): 10 TABLET, FILM COATED ORAL at 22:47

## 2021-09-14 RX ADMIN — Medication 500 MILLIGRAM(S): at 13:08

## 2021-09-14 RX ADMIN — Medication 3: at 16:21

## 2021-09-14 RX ADMIN — HEPARIN SODIUM 5000 UNIT(S): 5000 INJECTION INTRAVENOUS; SUBCUTANEOUS at 13:07

## 2021-09-14 RX ADMIN — INSULIN GLARGINE 12 UNIT(S): 100 INJECTION, SOLUTION SUBCUTANEOUS at 21:45

## 2021-09-14 RX ADMIN — Medication 2: at 13:07

## 2021-09-14 RX ADMIN — LOSARTAN POTASSIUM 50 MILLIGRAM(S): 100 TABLET, FILM COATED ORAL at 09:09

## 2021-09-14 RX ADMIN — Medication 3 UNIT(S): at 13:06

## 2021-09-14 RX ADMIN — Medication 5 MILLIGRAM(S): at 22:47

## 2021-09-14 RX ADMIN — PANTOPRAZOLE SODIUM 40 MILLIGRAM(S): 20 TABLET, DELAYED RELEASE ORAL at 13:07

## 2021-09-14 RX ADMIN — HEPARIN SODIUM 5000 UNIT(S): 5000 INJECTION INTRAVENOUS; SUBCUTANEOUS at 22:47

## 2021-09-14 RX ADMIN — Medication 500 MILLIGRAM(S): at 22:47

## 2021-09-14 NOTE — CHART NOTE - NSCHARTNOTEFT_GEN_A_CORE
Preop Dx: cholelithiasis  Surgeon: Dr. Douglass  Procedure: laparoscopic cholecystectomy with possible intra-operative cholangiogram    Vital Signs Last 24 Hrs  T(C): 36.7 (14 Sep 2021 14:15), Max: 36.8 (13 Sep 2021 19:37)  T(F): 98 (14 Sep 2021 14:15), Max: 98.2 (13 Sep 2021 19:37)  HR: 63 (14 Sep 2021 14:15) (52 - 63)  BP: 137/60 (14 Sep 2021 14:15) (134/62 - 137/60)  BP(mean): --  RR: 18 (14 Sep 2021 14:15) (17 - 18)  SpO2: 98% (14 Sep 2021 14:15) (97% - 98%)                        9.8    7.26  )-----------( 361      ( 14 Sep 2021 08:17 )             30.5     09-14    137  |  104  |  11  ----------------------------<  215<H>  4.4   |  29  |  0.94    Ca    8.6      14 Sep 2021 08:17  Phos  3.1     09-14  Mg     1.8     09-14    TPro  7.1  /  Alb  2.6<L>  /  TBili  1.7<H>  /  DBili  x   /  AST  25  /  ALT  56  /  AlkPhos  304<H>  09-14    Surgical Pathology Report (09.09.21 @ 13:30)    Surgical Pathology Report:   ACCESSION No:  [id number]  Patient:   SD YANEZ    Accession:                             [id number]  Collected Date/Time:                   9/9/2021 13:30 EDT  Received Date/Time:                    9/9/2021 17:07 EDT    Surgical Pathology Report - Auth (Verified)  Specimen(s) Submitted  1  Ampulla bx    Final Diagnosis  Ampulla; biopsy:  - Acutely inflamed, partially cauterized glandular epithelium with focal  reactive cellular atypia in a background of blood clots and mucin.    - Cresyl violet stain is noncontributory.  Verified by: Nathaly Galvin MD  (Electronic Signature)  Reported on: 09/14/21 15:30 EDT, [facility], [address] [place] [address], [place] [address]  Phone: [phone]   Fax: [phone]  _________________________________________________________________  Comment  Case reviewed intradepartmentally for .  Clinical Information  ERCP  Gross Description  1. The specimen is received in formalin and thespecimen container is  labeled:  Ampulla biopsy .  It consists of two pale tan fragments of soft  tissue each measuring 0.2 cm in greatest dimension.  Entirely submitted.  One cassette.  In addition to other data that may appear on the specimen container, the  label has been inspected to confirm the presence of the patient's name  and date of birth.  Mark Glaser 09/12/2021 20:44  Disclaimer  Histological Processing Performed at [facility] [place],  Department of Pathology, [address] [place] [address]      81F with cholelithiasis and choledocholithiasis s/p ERCP with CBD stent 9/9/21, improving transaminitis  pathology as above    - OR tomorrow with Dr. Dogulass for laparoscopic cholecystectomy with possible intra-operative cholangiogram  - Consent to be obtained  - PREOP labs type and screen, cbc, bmp and covid swab  - NPO after midnight  - Medical clearance noted: 2 points, Class II risk 10.1% risk of 30-day risk of death, MI, or cardiac arrest. Preop Dx: cholelithiasis  Surgeon: Dr. Douglass  Procedure: laparoscopic cholecystectomy with possible intra-operative cholangiogram    Vital Signs Last 24 Hrs  T(C): 36.7 (14 Sep 2021 14:15), Max: 36.8 (13 Sep 2021 19:37)  T(F): 98 (14 Sep 2021 14:15), Max: 98.2 (13 Sep 2021 19:37)  HR: 63 (14 Sep 2021 14:15) (52 - 63)  BP: 137/60 (14 Sep 2021 14:15) (134/62 - 137/60)  BP(mean): --  RR: 18 (14 Sep 2021 14:15) (17 - 18)  SpO2: 98% (14 Sep 2021 14:15) (97% - 98%)                        9.8    7.26  )-----------( 361      ( 14 Sep 2021 08:17 )             30.5     09-14    137  |  104  |  11  ----------------------------<  215<H>  4.4   |  29  |  0.94    Ca    8.6      14 Sep 2021 08:17  Phos  3.1     09-14  Mg     1.8     09-14    TPro  7.1  /  Alb  2.6<L>  /  TBili  1.7<H>  /  DBili  x   /  AST  25  /  ALT  56  /  AlkPhos  304<H>  09-14    Surgical Pathology Report (09.09.21 @ 13:30)    Surgical Pathology Report:   ACCESSION No:  [id number]  Patient:   SD YANEZ    Accession:                             [id number]  Collected Date/Time:                   9/9/2021 13:30 EDT  Received Date/Time:                    9/9/2021 17:07 EDT    Surgical Pathology Report - Auth (Verified)  Specimen(s) Submitted  1  Ampulla bx    Final Diagnosis  Ampulla; biopsy:  - Acutely inflamed, partially cauterized glandular epithelium with focal  reactive cellular atypia in a background of blood clots and mucin.    - Cresyl violet stain is noncontributory.  Verified by: Nathaly Galvin MD  (Electronic Signature)  Reported on: 09/14/21 15:30 EDT, [facility], [address] [place] [address], [place] [address]  Phone: [phone]   Fax: [phone]  _________________________________________________________________  Comment  Case reviewed intradepartmentally for .  Clinical Information  ERCP  Gross Description  1. The specimen is received in formalin and thespecimen container is  labeled:  Ampulla biopsy .  It consists of two pale tan fragments of soft  tissue each measuring 0.2 cm in greatest dimension.  Entirely submitted.  One cassette.  In addition to other data that may appear on the specimen container, the  label has been inspected to confirm the presence of the patient's name  and date of birth.  Mark Glaser 09/12/2021 20:44  Disclaimer  Histological Processing Performed at [facility] [place],  Department of Pathology, [address] [place] [address]      81F with cholelithiasis and choledocholithiasis s/p ERCP with CBD stent 9/9/21, improving transaminitis  pathology as above    - OR tomorrow with Dr. Douglass for laparoscopic cholecystectomy with possible intra-operative cholangiogram  - Consent to be obtained from [facility] [place]   - PREOP labs type and screen, cbc, bmp and covid swab  - NPO after midnight  - Medical clearance noted: 2 points, Class II risk 10.1% risk of 30-day risk of death, MI, or cardiac arrest.

## 2021-09-14 NOTE — PROGRESS NOTE ADULT - PROBLEM SELECTOR PLAN 6
on home losartan hctz nifedipine  hold all htn meds for now  BP soft this AM 98/62  monitor bp  resume as needed on home losartan hctz nifedipine  hold all htn meds for now  patient refusing vitals this AM  monitor bp  resume as needed

## 2021-09-14 NOTE — PROGRESS NOTE ADULT - ATTENDING COMMENTS
80 year old F with jaundice for 3 days, poor Po intake and abdominal pain for 2 weeks found to have transaminitis like biliary obstruction. CT abd/pelvis with dilated intra and extrahepatic biliary ducts, 1.4cm stone or mass in region of ampulla. Patient treated with Zosyn for cholangitis per Dr. Basilio now switched to PO ceftin and flagyl. Underwent ERCP  with Dr. Clemens with removal of stone and placement of metallic stent pn 9/10. Patient tolerating mechanical soft diet. Discussed with daughters regarding inpatient vs outpatient cholestectomy, family wants inpatient after speaking with surgeon.   - Awaiting path results prior to cholestectomy with Dr. Douglass cw atbx  awaiting path results- cholecystectomy once pathology back.

## 2021-09-14 NOTE — PROGRESS NOTE ADULT - ASSESSMENT
81F, ambulates independently, lives alone at home with HHA [id number] (on 2nd floor, daughter Jodee  lives on 1st floor, daughter Delfina  lives 2 houses down) with PMH HTN, DM, PSH Diverticulitis needing exlap, p/w progressive jaundice x 1 week admitted for obstructive jaundice

## 2021-09-14 NOTE — DIETITIAN INITIAL EVALUATION ADULT. - PROBLEM SELECTOR PLAN 2
Lipase 12K + abdominal pain  gallstone pancreatitis  IVF LR 150cc/hr for now  No need to trend lipase  Tramadol 25 q8 + tylenol 325 q12 prn for pain control for now  s/p 1 L bolus in ED

## 2021-09-14 NOTE — PROGRESS NOTE ADULT - ASSESSMENT
82 y/o Female s/p ERCP & metal stent by KEON, resolving transaminitis    -F/u path  -Plan for lap tiffanie this week if path negative  -Med optimization and clearance  -Regular diet

## 2021-09-14 NOTE — PROGRESS NOTE ADULT - SUBJECTIVE AND OBJECTIVE BOX
PGY-1 Progress Note discussed with attending    PAGER #: [[phone]] TILL 5:00 PM  PLEASE CONTACT ON CALL TEAM:  - On Call Team (Please refer to Vicky) FROM 5:00 PM - 8:30PM  - Nightfloat Team FROM 8:30 -7:30 AM    CHIEF COMPLAINT & BRIEF HOSPITAL COURSE:  81F, ambulates independently, lives alone at home with [id number] (on 2nd floor, daughter Jodee  lives on 1st floor, daughter  (HPI historian)  Delfina  lives 2 houses down) with PMH HTN, DM, PSH Diverticulitis needing exlap.    She p/w progressive jaundice x 1 week worsened over 2 days prior to admission. She was overtly yellow on the day of admission which prompted ED visit.     Pt has been complaining about intermittent epigastric postprandial pain. SX are associated with chills, tactile fevers, pruritus, elevated home FS sugars and decreased PO intake and subsequent 8lb weight loss in the last 2 weeks      Pt is recently diagnosed with dementia x1yr since getting COVID in 2020. As per family, patient has outbursts of anger and has poor short term memory during the interview.    In the ED: AFVSS, On  Exam: epigastric tenderness and jaundice on face.  Labs: WCBC 15, Hgb 10.3, Na 131, Cr 1.56, glucose 400s Bili 10.4, Alk phos 640 ALST 250  , lipase [id number]  Urine contaminated  Hepatitis panel non reactive  CT Abd- dilated CBD, Dilated pancreatic duct, stone in ampulla and small stones in the galbladder.    She was admitted for obstructive jaundice and work up of choledocholithiasis vs cholangitis, and for subsequent gal stone pancreatitis.      INTERVAL HPI/OVERNIGHT EVENTS:   S/p ERCP stone removal and stent placement  ON ABX PO, needs frequent reorientation   On enhanced surveillance  Cooperative during exam this AM, has intermittent, minimal abdominal pain, comes and goes     MEDICATIONS  (STANDING):  cefuroxime   Tablet 500 milliGRAM(s) Oral every 12 hours  dextrose 5%. 1000 milliLiter(s) (100 mL/Hr) IV Continuous <Continuous>  donepezil 10 milliGRAM(s) Oral at bedtime  glucagon  Injectable 1 milliGRAM(s) IntraMuscular once  heparin   Injectable 5000 Unit(s) SubCutaneous every 8 hours  insulin glargine Injectable (LANTUS) 12 Unit(s) SubCutaneous at bedtime  insulin lispro (ADMELOG) corrective regimen sliding scale   SubCutaneous three times a day before meals  insulin lispro (ADMELOG) corrective regimen sliding scale   SubCutaneous at bedtime  insulin lispro Injectable (ADMELOG) 3 Unit(s) SubCutaneous before lunch  lactated ringers. 1000 milliLiter(s) (150 mL/Hr) IV Continuous <Continuous>  losartan 50 milliGRAM(s) Oral daily  metroNIDAZOLE    Tablet 500 milliGRAM(s) Oral three times a day  pantoprazole  Injectable 40 milliGRAM(s) IV Push daily    MEDICATIONS  (PRN):  acetaminophen   Tablet .. 325 milliGRAM(s) Oral every 12 hours PRN Temp greater or equal to 38C (100.4F), Mild Pain (1 - 3)  traMADol 25 milliGRAM(s) Oral every 8 hours PRN Severe Pain (7 - 10)      REVIEW OF SYSTEMS:  CONSTITUTIONAL: No weakness, fevers or chills  EYES/ENT: No visual changes;  No vertigo or throat pain   NECK: No pain or stiffness  RESPIRATORY: No cough, wheezing, hemoptysis; No shortness of breath  CARDIOVASCULAR: No chest pain or palpitations  GASTROINTESTINAL: No abdominal or epigastric pain. No nausea, vomiting, or hematemesis; No diarrhea or constipation. No melena or hematochezia.  GENITOURINARY: No dysuria, frequency or hematuria  NEUROLOGICAL: No numbness or weakness  SKIN: No itching, rashes      Vital Signs Last 24 Hrs  T(C): 36.3 (13 Sep 2021 04:51), Max: 36.8 (12 Sep 2021 13:27)  T(F): 97.4 (13 Sep 2021 04:51), Max: 98.2 (12 Sep 2021 13:27)  HR: 50 (13 Sep 2021 04:51) (50 - 65)  BP: 163/69 (13 Sep 2021 04:51) (136/57 - 163/69)  BP(mean): --  RR: 18 (13 Sep 2021 04:51) (17 - 18)  SpO2: 98% (13 Sep 2021 04:51) (97% - 98%)    PHYSICAL EXAMINATION:  GENERAL: NAD, reclining in bed  HEAD:  Atraumatic, Normocephalic  EYES: anicteric, no conjunctival erythema  NECK: Supple, No JVD  CHEST/LUNG: Clear to auscultation bilaterally; No rales, rhonchi, wheezing, or rubs. Unlabored respirations  HEART: Regular rate and rhythm; No murmurs, rubs, or gallops  ABDOMEN: Bowel sounds present; Soft, Nontender, Nondistended.   EXTREMITIES:  2+ Peripheral Pulses. No clubbing, cyanosis, or edema  NERVOUS SYSTEM:  Alert & Oriented X1, speech clear. No focal deficits   SKIN: No rashes or lesions                          9.9    9.86  )-----------( 318      ( 11 Sep 2021 08:09 )             28.4     09-11    135  |  101  |  14  ----------------------------<  151<H>  4.2   |  28  |  0.89    Ca    8.3<L>      11 Sep 2021 08:09  Phos  2.6     09-11  Mg     1.8     09-11    TPro  7.3  /  Alb  2.5<L>  /  TBili  2.9<H>  /  DBili  1.9<H>  /  AST  45<H>  /  ALT  110<H>  /  AlkPhos  451<H>  09-11    LIVER FUNCTIONS - ( 11 Sep 2021 08:09 )  Alb: 2.5 g/dL / Pro: 7.3 g/dL / ALK PHOS: 451 U/L / ALT: 110 U/L DA / AST: 45 U/L / GGT: x                    PGY-1 Progress Note discussed with attending    PAGER #: [[phone]] TILL 5:00 PM  PLEASE CONTACT ON CALL TEAM:  - On Call Team (Please refer to Vicky) FROM 5:00 PM - 8:30PM  - Nightfloat Team FROM 8:30 -7:30 AM    CHIEF COMPLAINT & BRIEF HOSPITAL COURSE:  81F, ambulates independently, lives alone at home with [id number] (on 2nd floor, daughter Jodee  lives on 1st floor, daughter  (HPI historian)  Delfina  lives 2 houses down) with PMH HTN, DM, PSH Diverticulitis needing exlap.    She p/w progressive jaundice x 1 week worsened over 2 days prior to admission. She was overtly yellow on the day of admission which prompted ED visit.     Pt has been complaining about intermittent epigastric postprandial pain. SX are associated with chills, tactile fevers, pruritus, elevated home FS sugars and decreased PO intake and subsequent 8lb weight loss in the last 2 weeks      Pt is recently diagnosed with dementia x1yr since getting COVID in 2020. As per family, patient has outbursts of anger and has poor short term memory during the interview.    In the ED: AFVSS, On  Exam: epigastric tenderness and jaundice on face.  Labs: WCBC 15, Hgb 10.3, Na 131, Cr 1.56, glucose 400s Bili 10.4, Alk phos 640 ALST 250  , lipase [id number]  Urine contaminated  Hepatitis panel non reactive  CT Abd- dilated CBD, Dilated pancreatic duct, stone in ampulla and small stones in the galbladder.    She was admitted for obstructive jaundice and work up of choledocholithiasis vs cholangitis, and for subsequent gal stone pancreatitis.      INTERVAL HPI/OVERNIGHT EVENTS:   S/p ERCP stone removal and stent placement  ON ABX PO, needs frequent reorientation   On enhanced surveillance  Patient was seen and examined at bedside  Cooperative during exam this AM, has intermittent, minimal abdominal pain, comes and goes  Patient was not in pain during examination at bedside     MEDICATIONS  (STANDING):  cefuroxime   Tablet 500 milliGRAM(s) Oral every 12 hours  dextrose 5%. 1000 milliLiter(s) (100 mL/Hr) IV Continuous <Continuous>  donepezil 10 milliGRAM(s) Oral at bedtime  glucagon  Injectable 1 milliGRAM(s) IntraMuscular once  heparin   Injectable 5000 Unit(s) SubCutaneous every 8 hours  insulin glargine Injectable (LANTUS) 12 Unit(s) SubCutaneous at bedtime  insulin lispro (ADMELOG) corrective regimen sliding scale   SubCutaneous three times a day before meals  insulin lispro (ADMELOG) corrective regimen sliding scale   SubCutaneous at bedtime  insulin lispro Injectable (ADMELOG) 3 Unit(s) SubCutaneous before lunch  lactated ringers. 1000 milliLiter(s) (150 mL/Hr) IV Continuous <Continuous>  losartan 50 milliGRAM(s) Oral daily  metroNIDAZOLE    Tablet 500 milliGRAM(s) Oral three times a day  pantoprazole  Injectable 40 milliGRAM(s) IV Push daily    MEDICATIONS  (PRN):  acetaminophen   Tablet .. 325 milliGRAM(s) Oral every 12 hours PRN Temp greater or equal to 38C (100.4F), Mild Pain (1 - 3)  traMADol 25 milliGRAM(s) Oral every 8 hours PRN Severe Pain (7 - 10)      REVIEW OF SYSTEMS:  CONSTITUTIONAL: No weakness, fevers or chills  EYES/ENT: No visual changes;  No vertigo or throat pain   NECK: No pain or stiffness  RESPIRATORY: No cough, wheezing, hemoptysis; No shortness of breath  CARDIOVASCULAR: No chest pain or palpitations  GASTROINTESTINAL: No abdominal or epigastric pain. No nausea, vomiting, or hematemesis; No diarrhea or constipation. No melena or hematochezia.  GENITOURINARY: No dysuria, frequency or hematuria  NEUROLOGICAL: No numbness or weakness  SKIN: No itching, rashes      Vital Signs Last 24 Hrs  T(C): 36.3 (13 Sep 2021 04:51), Max: 36.8 (12 Sep 2021 13:27)  T(F): 97.4 (13 Sep 2021 04:51), Max: 98.2 (12 Sep 2021 13:27)  HR: 50 (13 Sep 2021 04:51) (50 - 65)  BP: 163/69 (13 Sep 2021 04:51) (136/57 - 163/69)  BP(mean): --  RR: 18 (13 Sep 2021 04:51) (17 - 18)  SpO2: 98% (13 Sep 2021 04:51) (97% - 98%)    PHYSICAL EXAMINATION:  GENERAL: NAD, reclining in bed  HEAD:  Atraumatic, Normocephalic  EYES: anicteric, no conjunctival erythema  NECK: Supple, No JVD  CHEST/LUNG: Clear to auscultation bilaterally; No rales, rhonchi, wheezing, or rubs. Unlabored respirations  HEART: Regular rate and rhythm; No murmurs, rubs, or gallops  ABDOMEN: Bowel sounds present; Soft, Nontender, Nondistended.   EXTREMITIES:  2+ Peripheral Pulses. No clubbing, cyanosis, or edema  NERVOUS SYSTEM:  Alert & Oriented X1, speech clear. No focal deficits   SKIN: No rashes or lesions                          9.9    9.86  )-----------( 318      ( 11 Sep 2021 08:09 )             28.4     09-11    135  |  101  |  14  ----------------------------<  151<H>  4.2   |  28  |  0.89    Ca    8.3<L>      11 Sep 2021 08:09  Phos  2.6     09-11  Mg     1.8     09-11    TPro  7.3  /  Alb  2.5<L>  /  TBili  2.9<H>  /  DBili  1.9<H>  /  AST  45<H>  /  ALT  110<H>  /  AlkPhos  451<H>  09-11    LIVER FUNCTIONS - ( 11 Sep 2021 08:09 )  Alb: 2.5 g/dL / Pro: 7.3 g/dL / ALK PHOS: 451 U/L / ALT: 110 U/L DA / AST: 45 U/L / GGT: x                    PGY-1 Progress Note discussed with attending    PAGER #: [[phone]] TILL 5:00 PM  PLEASE CONTACT ON CALL TEAM:  - On Call Team (Please refer to Vicky) FROM 5:00 PM - 8:30PM  - Nightfloat Team FROM 8:30 -7:30 AM    CHIEF COMPLAINT & BRIEF HOSPITAL COURSE:  81F, ambulates independently, lives alone at home with [facility] [id number] (on 2nd floor, daughter Jodee  lives on 1st floor, daughter  (HPI historian)  Delfina  lives 2 houses down) with PMH HTN, DM, PSH Diverticulitis needing exlap.    She p/w progressive jaundice x 1 week worsened over 2 days prior to admission. She was overtly yellow on the day of admission which prompted ED visit.     Pt has been complaining about intermittent epigastric postprandial pain. SX are associated with chills, tactile fevers, pruritus, elevated home FS sugars and decreased PO intake and subsequent 8lb weight loss in the last 2 weeks      Pt is recently diagnosed with dementia x1yr since getting COVID in 2020. As per family, patient has outbursts of anger and has poor short term memory during the interview.    In the ED: AFVSS, On  Exam: epigastric tenderness and jaundice on face.  Labs: WCBC 15, Hgb 10.3, Na 131, Cr 1.56, glucose 400s Bili 10.4, Alk phos 640 ALST 250  , lipase 11804h  Urine contaminated  Hepatitis panel non reactive  CT Abd- dilated CBD, Dilated pancreatic duct, stone in ampulla and small stones in the galbladder.    She was admitted for obstructive jaundice and work up of choledocholithiasis vs cholangitis, and for subsequent gal stone pancreatitis.      INTERVAL HPI/OVERNIGHT EVENTS:   S/p ERCP stone removal and stent placement  ON ABX PO, needs frequent reorientation   On enhanced surveillance  Patient was seen and examined at bedside  Cooperative during exam this AM, has intermittent, minimal abdominal pain, comes and goes  Patient was not in pain during examination at bedside     MEDICATIONS  (STANDING):  cefuroxime   Tablet 500 milliGRAM(s) Oral every 12 hours  dextrose 5%. 1000 milliLiter(s) (100 mL/Hr) IV Continuous <Continuous>  donepezil 10 milliGRAM(s) Oral at bedtime  glucagon  Injectable 1 milliGRAM(s) IntraMuscular once  heparin   Injectable 5000 Unit(s) SubCutaneous every 8 hours  insulin glargine Injectable (LANTUS) 12 Unit(s) SubCutaneous at bedtime  insulin lispro (ADMELOG) corrective regimen sliding scale   SubCutaneous three times a day before meals  insulin lispro (ADMELOG) corrective regimen sliding scale   SubCutaneous at bedtime  insulin lispro Injectable (ADMELOG) 3 Unit(s) SubCutaneous before lunch  lactated ringers. 1000 milliLiter(s) (150 mL/Hr) IV Continuous <Continuous>  losartan 50 milliGRAM(s) Oral daily  melatonin 5 milliGRAM(s) Oral at bedtime  metroNIDAZOLE    Tablet 500 milliGRAM(s) Oral three times a day  pantoprazole  Injectable 40 milliGRAM(s) IV Push daily    MEDICATIONS  (PRN):  acetaminophen   Tablet .. 325 milliGRAM(s) Oral every 12 hours PRN Temp greater or equal to 38C (100.4F), Mild Pain (1 - 3)  traMADol 25 milliGRAM(s) Oral every 8 hours PRN Severe Pain (7 - 10)        REVIEW OF SYSTEMS:  CONSTITUTIONAL: No weakness, fevers or chills  EYES/ENT: No visual changes;  No vertigo or throat pain   NECK: No pain or stiffness  RESPIRATORY: No cough, wheezing, hemoptysis; No shortness of breath  CARDIOVASCULAR: No chest pain or palpitations  GASTROINTESTINAL: No abdominal or epigastric pain. No nausea, vomiting, or hematemesis; No diarrhea or constipation. No melena or hematochezia.  GENITOURINARY: No dysuria, frequency or hematuria  NEUROLOGICAL: No numbness or weakness  SKIN: No itching, rashes    Vital Signs Last 24 Hrs  T(C): 36.7 (14 Sep 2021 14:15), Max: 36.8 (13 Sep 2021 19:37)  T(F): 98 (14 Sep 2021 14:15), Max: 98.2 (13 Sep 2021 19:37)  HR: 63 (14 Sep 2021 14:15) (52 - 63)  BP: 137/60 (14 Sep 2021 14:15) (134/62 - 137/60)  BP(mean): --  RR: 18 (14 Sep 2021 14:15) (17 - 18)  SpO2: 98% (14 Sep 2021 14:15) (97% - 98%)    PHYSICAL EXAMINATION:  GENERAL: NAD, reclining in bed  HEAD:  Atraumatic, Normocephalic  EYES: anicteric, no conjunctival erythema  NECK: Supple, No JVD  CHEST/LUNG: Clear to auscultation bilaterally; No rales, rhonchi, wheezing, or rubs. Unlabored respirations  HEART: Regular rate and rhythm; No murmurs, rubs, or gallops  ABDOMEN: Bowel sounds present; Soft, Nontender, Nondistended.   EXTREMITIES:  2+ Peripheral Pulses. No clubbing, cyanosis, or edema  NERVOUS SYSTEM:  Alert & Oriented X1, speech clear. No focal deficits   SKIN: No rashes or lesions               09-14    137  |  104  |  11  ----------------------------<  215<H>  4.4   |  29  |  0.94    Ca    8.6      14 Sep 2021 08:17  Phos  3.1     09-14  Mg     1.8     09-14    TPro  7.1  /  Alb  2.6<L>  /  TBili  1.7<H>  /  DBili  x   /  AST  25  /  ALT  56  /  AlkPhos  304<H>  09-14                            9.8    7.26  )-----------( 361      ( 14 Sep 2021 08:17 )             30.5

## 2021-09-14 NOTE — PROGRESS NOTE ADULT - PROBLEM SELECTOR PLAN 4
Mid epigastric pain, jaundice, tenderness on exam. Lipase 12K CT shows  possible obstructive stone in the ampulla  Sx consistent with gallstone pancreatitis, lipase is now 4000s from 02507  - Clear liquids ok'd by GI POD 1 now advanced to soft  - post ERCP lipase downtrending and continue to monitor for pain  - IVF LR 150cc/hr for now  - Tramadol 25 q8 + Tylenol 325 q12 prn for pain control for now  - s/p 1 L bolus in ED Mid epigastric pain, jaundice, tenderness on exam. Lipase 12K CT shows  possible obstructive stone in the ampulla  Sx consistent with gallstone pancreatitis, lipase is now 4000s from 92728  - Clear liquids ok'd by GI POD 1 now advanced to soft  - post ERCP lipase downtrending and continue to monitor for pain  - Tramadol 25 q8 + Tylenol 325 q12 prn for pain control   - s/p 1 L bolus in ED On admission Mid epigastric pain, jaundice, tenderness on exam. Lipase 12K CT shows  possible obstructive stone in the ampulla  Sx consistent with gallstone pancreatitis, lipase is now 4000s from 71537  - Clear liquids ok'd by GI POD 1 now advanced to soft  - post ERCP lipase downtrending and continue to monitor for pain  - Tramadol 25 q8 + Tylenol 325 q12 prn for pain control   - s/p 1 L bolus in ED  - Currently Resolved.

## 2021-09-14 NOTE — DIETITIAN INITIAL EVALUATION ADULT. - FACTORS AFF FOOD INTAKE
weakness, jaundice, diverticulitis needing exlap, diabetes, pancreatitis, HTN, dementia, h/o multiple surgeries/difficulty with food procurement/preparation/pain/persistent nausea/other (specify)

## 2021-09-14 NOTE — DIETITIAN INITIAL EVALUATION ADULT. - PERTINENT LABORATORY DATA
09-14 Na137 mmol/L Glu 215 mg/dL<H> K+ 4.4 mmol/L Cr  0.94 mg/dL BUN 11 mg/dL   09-14 Phos 3.1 mg/dL   09-14 Alb 2.6 g/dL<L>        09-10-21 @ 09:34 HgbA1C 8.1

## 2021-09-14 NOTE — PROGRESS NOTE ADULT - PROBLEM SELECTOR PLAN 5
Hb 10.3 unknown baseline 9/11 9.9 slowly down trending but stable, s/p ERCP, no signs of bleeding   Monitor CBC, H/H

## 2021-09-14 NOTE — PROGRESS NOTE ADULT - PROBLEM SELECTOR PLAN 7
on janumet and glimeperide, came in with sugars 400s. Likely d/t pancreatitis, and beta cell transient dysfunction.  A1C 9/10 8.1  FS ACHS  maintain fs 140-180  BG ranging 190s-200s yesterday 9/13  On Lantus 12, determine ISS based on BG readings  Continue to monitor BG and sliding scale as needed

## 2021-09-14 NOTE — PROGRESS NOTE ADULT - PROBLEM SELECTOR PLAN 9
hold dvt prophylaxis for now as pt is for possible surgery lap tiffanie  resume after procedure if ok with GI - lovenox 40 sq qd

## 2021-09-14 NOTE — DIETITIAN INITIAL EVALUATION ADULT. - PROBLEM SELECTOR PLAN 1
Bilirubin 10.4, INR 1.15  Liver function with obstructive pattern - likely cholelithiasis, cholecystitis, gallstone pancreatitis  f/u bilirubin direct indirect  CT AP Shows: Dilated intra and extrahepatic biliary ducts. The common bile duct measures up to 1.5 cm in diameter which is dilated. The main pancreatic duct is mildly dilated as well. Apparent 1.4 cm stone or mass in the region of the ampulla.   No fever, WBC 15k, no tachycardia  Started on Zosyn for now  ID consulted Dr Chetna Clemens GI consulted - plan for EUS/ERCP in AM  NPO After midnight, clears for now

## 2021-09-14 NOTE — DIETITIAN INITIAL EVALUATION ADULT. - PERTINENT MEDS FT
MEDICATIONS  (STANDING):  cefuroxime   Tablet 500 milliGRAM(s) Oral every 12 hours  dextrose 5%. 1000 milliLiter(s) (100 mL/Hr) IV Continuous <Continuous>  donepezil 10 milliGRAM(s) Oral at bedtime  glucagon  Injectable 1 milliGRAM(s) IntraMuscular once  heparin   Injectable 5000 Unit(s) SubCutaneous every 8 hours  insulin glargine Injectable (LANTUS) 12 Unit(s) SubCutaneous at bedtime  insulin lispro (ADMELOG) corrective regimen sliding scale   SubCutaneous three times a day before meals  insulin lispro (ADMELOG) corrective regimen sliding scale   SubCutaneous at bedtime  insulin lispro Injectable (ADMELOG) 3 Unit(s) SubCutaneous before lunch  lactated ringers. 1000 milliLiter(s) (150 mL/Hr) IV Continuous <Continuous>  losartan 50 milliGRAM(s) Oral daily  melatonin 5 milliGRAM(s) Oral at bedtime  metroNIDAZOLE    Tablet 500 milliGRAM(s) Oral three times a day  pantoprazole  Injectable 40 milliGRAM(s) IV Push daily    MEDICATIONS  (PRN):  acetaminophen   Tablet .. 325 milliGRAM(s) Oral every 12 hours PRN Temp greater or equal to 38C (100.4F), Mild Pain (1 - 3)  traMADol 25 milliGRAM(s) Oral every 8 hours PRN Severe Pain (7 - 10)

## 2021-09-14 NOTE — DIETITIAN INITIAL EVALUATION ADULT. - OTHER INFO
Patient from home lives alone & has HHA. Visited pt. with enhanced supervision, alert but at time confused, d/w PCA at beside, pt. with fair to good po intake, self feeder & at times needs encouragement, NPO & awaiting EUS/ERCP today, GI/team following & plans for Lap. chol. this week, surgery/team consulted, skin intact, no edema.

## 2021-09-14 NOTE — PROGRESS NOTE ADULT - SUBJECTIVE AND OBJECTIVE BOX
INTERVAL HPI/OVERNIGHT EVENTS:    Pt seen and examined at bedside. No acute complaints at this time.     Vital Signs Last 24 Hrs  T(C): 36.8 (13 Sep 2021 19:37), Max: 36.8 (13 Sep 2021 19:37)  T(F): 98.2 (13 Sep 2021 19:37), Max: 98.2 (13 Sep 2021 19:37)  HR: 52 (13 Sep 2021 19:37) (52 - 65)  BP: 134/62 (13 Sep 2021 19:37) (134/62 - 144/61)  BP(mean): --  RR: 17 (13 Sep 2021 19:37) (17 - 17)  SpO2: 97% (13 Sep 2021 19:37) (92% - 97%)  I&O's Detail    cefuroxime   Tablet 500 milliGRAM(s) Oral every 12 hours  metroNIDAZOLE    Tablet 500 milliGRAM(s) Oral three times a day  pantoprazole  Injectable 40 milliGRAM(s) IV Push daily    Physical Exam  General: AAOx3, No acute distress  Abdomen: soft,  nondistended, nontender, no rebound tenderness, no guarding, no palpable masses  Extremities: non edematous, no calf pain bilaterally

## 2021-09-14 NOTE — PROGRESS NOTE ADULT - PROBLEM SELECTOR PLAN 2
recent dementia on donepezil- follows with Dr. Moss outpatient  given 1 dose seroquel 25 mg qd   needs frequent reorientation  Daughters come every day to help reorient patient for medication compliance  Daughters HCP have made decision to stay until she is s/p darci moreno recent dementia on donepezil- follows with Dr. Moss outpatient  given 1 dose seroquel 25 mg qd   needs frequent reorientation  Daughters visit daily to help reorient patient for medication compliance  Daughters HCP have made decision to stay until she is s/p darci moreno recent dementia on donepezil- follows with Dr. Moss outpatient  needs frequent reorientation  Daughters visit daily to help reorient patient for medication compliance  Daughters HCP have made decision to stay until she is s/p darci moreno

## 2021-09-14 NOTE — PROGRESS NOTE ADULT - PROBLEM SELECTOR PLAN 3
Admitted for obstructive jaundice  Plan: On admission, Bilirubin 10.4, INR 1.15  Liver function with obstructive pattern - likely choledocholithiasis, cholangitis  CT AP Shows: Dilated intra and extrahepatic biliary ducts. The common bile duct measures up to 1.5 cm in diameter which is dilated. The main pancreatic duct is mildly dilated as well. Apparent 1.4 cm stone or mass in the region of the ampulla. Dr. Jayleen FOOTE was consulted, pt is s/p ERCP with stent placement EUS yesterday, stones, pus, and sludge removed, with excellent drainage, possible choledococele found in ampulla, bx taken to r/o malignancy  Patient is doing well this AM. No new complaints. No fever, WBC downtrending 11.9 this AM, no tachycardia  - s/p ERCP Thursday 9/9  - C/w Ceftin BID, flagyl TID  EOT 9/17/2021  - GI following, recs appreciated  - started on clears POD 1 now on soft-continue  - monitor postprandial pain  - Consulted surgery lap tiffanie this week after path results Admitted for obstructive jaundice  Plan: On admission, Bilirubin 10.4, INR 1.15  Liver function with obstructive pattern - likely choledocholithiasis, cholangitis  CT AP Shows: Dilated intra and extrahepatic biliary ducts. The common bile duct measures up to 1.5 cm in diameter which is dilated. The main pancreatic duct is mildly dilated as well. Apparent 1.4 cm stone or mass in the region of the ampulla. Dr. Jayleen FOOTE was consulted, pt is s/p ERCP with stent placement EUS yesterday, stones, pus, and sludge removed, with excellent drainage, possible choledococele found in ampulla, bx taken to r/o malignancy  Patient is doing well this AM. No new complaints. No fever, WBC downtrending 11.9 this AM, no tachycardia  - s/p ERCP Thursday 9/9  - C/w Ceftin BID, flagyl TID EOT 9/17/2021  - GI following, recs appreciated  - started on clears POD 1 now on soft-continue  - monitor postprandial pain  - Surgery Dr. Douglass following  - Awaiting path results for lap tiffanie Admitted for obstructive jaundice  Plan: On admission, Bilirubin 10.4, INR 1.15  Liver function with obstructive pattern - likely choledocholithiasis, cholangitis  - s/p ERCP Thursday 9/9  - C/w Ceftin BID, flagyl TID EOT 9/17/2021  - GI following, recs appreciated  - started on  soft-continue  - Surgery Dr. Douglass following  - Awaiting path results for lap tiffanie

## 2021-09-15 ENCOUNTER — RESULT REVIEW (OUTPATIENT)
Age: 81
End: 2021-09-15

## 2021-09-15 LAB
BLD GP AB SCN SERPL QL: SIGNIFICANT CHANGE UP
GLUCOSE BLDC GLUCOMTR-MCNC: 174 MG/DL — HIGH (ref 70–99)
GLUCOSE BLDC GLUCOMTR-MCNC: 176 MG/DL — HIGH (ref 70–99)
GLUCOSE BLDC GLUCOMTR-MCNC: 181 MG/DL — HIGH (ref 70–99)
GLUCOSE BLDC GLUCOMTR-MCNC: 220 MG/DL — HIGH (ref 70–99)
GLUCOSE BLDC GLUCOMTR-MCNC: 249 MG/DL — HIGH (ref 70–99)
GLUCOSE BLDC GLUCOMTR-MCNC: 291 MG/DL — HIGH (ref 70–99)
GLUCOSE BLDC GLUCOMTR-MCNC: 328 MG/DL — HIGH (ref 70–99)

## 2021-09-15 PROCEDURE — 88304 TISSUE EXAM BY PATHOLOGIST: CPT | Mod: 26

## 2021-09-15 PROCEDURE — 44970 LAPAROSCOPY APPENDECTOMY: CPT

## 2021-09-15 PROCEDURE — 93010 ELECTROCARDIOGRAM REPORT: CPT

## 2021-09-15 PROCEDURE — 99233 SBSQ HOSP IP/OBS HIGH 50: CPT | Mod: GC

## 2021-09-15 RX ORDER — DEXTROSE 50 % IN WATER 50 %
12.5 SYRINGE (ML) INTRAVENOUS ONCE
Refills: 0 | Status: DISCONTINUED | OUTPATIENT
Start: 2021-09-15 | End: 2021-09-17

## 2021-09-15 RX ORDER — NIFEDIPINE 30 MG
30 TABLET, EXTENDED RELEASE 24 HR ORAL DAILY
Refills: 0 | Status: DISCONTINUED | OUTPATIENT
Start: 2021-09-15 | End: 2021-09-17

## 2021-09-15 RX ORDER — PANTOPRAZOLE SODIUM 20 MG/1
40 TABLET, DELAYED RELEASE ORAL
Refills: 0 | Status: DISCONTINUED | OUTPATIENT
Start: 2021-09-15 | End: 2021-09-15

## 2021-09-15 RX ORDER — METOCLOPRAMIDE HCL 10 MG
10 TABLET ORAL ONCE
Refills: 0 | Status: DISCONTINUED | OUTPATIENT
Start: 2021-09-15 | End: 2021-09-15

## 2021-09-15 RX ORDER — HYDROMORPHONE HYDROCHLORIDE 2 MG/ML
0.5 INJECTION INTRAMUSCULAR; INTRAVENOUS; SUBCUTANEOUS
Refills: 0 | Status: DISCONTINUED | OUTPATIENT
Start: 2021-09-15 | End: 2021-09-17

## 2021-09-15 RX ORDER — ENOXAPARIN SODIUM 100 MG/ML
40 INJECTION SUBCUTANEOUS DAILY
Refills: 0 | Status: DISCONTINUED | OUTPATIENT
Start: 2021-09-16 | End: 2021-09-17

## 2021-09-15 RX ORDER — DEXTROSE 50 % IN WATER 50 %
25 SYRINGE (ML) INTRAVENOUS ONCE
Refills: 0 | Status: DISCONTINUED | OUTPATIENT
Start: 2021-09-15 | End: 2021-09-17

## 2021-09-15 RX ORDER — SODIUM CHLORIDE 9 MG/ML
1000 INJECTION, SOLUTION INTRAVENOUS
Refills: 0 | Status: DISCONTINUED | OUTPATIENT
Start: 2021-09-15 | End: 2021-09-17

## 2021-09-15 RX ORDER — FENTANYL CITRATE 50 UG/ML
25 INJECTION INTRAVENOUS
Refills: 0 | Status: DISCONTINUED | OUTPATIENT
Start: 2021-09-15 | End: 2021-09-15

## 2021-09-15 RX ORDER — INSULIN LISPRO 100/ML
3 VIAL (ML) SUBCUTANEOUS
Refills: 0 | Status: DISCONTINUED | OUTPATIENT
Start: 2021-09-15 | End: 2021-09-15

## 2021-09-15 RX ORDER — ACETAMINOPHEN 500 MG
1000 TABLET ORAL ONCE
Refills: 0 | Status: COMPLETED | OUTPATIENT
Start: 2021-09-15 | End: 2021-09-15

## 2021-09-15 RX ORDER — GLUCAGON INJECTION, SOLUTION 0.5 MG/.1ML
1 INJECTION, SOLUTION SUBCUTANEOUS ONCE
Refills: 0 | Status: DISCONTINUED | OUTPATIENT
Start: 2021-09-15 | End: 2021-09-17

## 2021-09-15 RX ORDER — KETOROLAC TROMETHAMINE 30 MG/ML
15 SYRINGE (ML) INJECTION EVERY 6 HOURS
Refills: 0 | Status: DISCONTINUED | OUTPATIENT
Start: 2021-09-15 | End: 2021-09-17

## 2021-09-15 RX ORDER — LANOLIN ALCOHOL/MO/W.PET/CERES
5 CREAM (GRAM) TOPICAL AT BEDTIME
Refills: 0 | Status: DISCONTINUED | OUTPATIENT
Start: 2021-09-15 | End: 2021-09-17

## 2021-09-15 RX ORDER — FENTANYL CITRATE 50 UG/ML
50 INJECTION INTRAVENOUS
Refills: 0 | Status: DISCONTINUED | OUTPATIENT
Start: 2021-09-15 | End: 2021-09-15

## 2021-09-15 RX ORDER — NIFEDIPINE 30 MG
30 TABLET, EXTENDED RELEASE 24 HR ORAL DAILY
Refills: 0 | Status: DISCONTINUED | OUTPATIENT
Start: 2021-09-15 | End: 2021-09-15

## 2021-09-15 RX ORDER — ACETAMINOPHEN 500 MG
1000 TABLET ORAL ONCE
Refills: 0 | Status: COMPLETED | OUTPATIENT
Start: 2021-09-16 | End: 2021-09-16

## 2021-09-15 RX ORDER — PANTOPRAZOLE SODIUM 20 MG/1
40 TABLET, DELAYED RELEASE ORAL DAILY
Refills: 0 | Status: DISCONTINUED | OUTPATIENT
Start: 2021-09-15 | End: 2021-09-17

## 2021-09-15 RX ORDER — DONEPEZIL HYDROCHLORIDE 10 MG/1
10 TABLET, FILM COATED ORAL AT BEDTIME
Refills: 0 | Status: DISCONTINUED | OUTPATIENT
Start: 2021-09-15 | End: 2021-09-17

## 2021-09-15 RX ORDER — DEXTROSE 50 % IN WATER 50 %
15 SYRINGE (ML) INTRAVENOUS ONCE
Refills: 0 | Status: DISCONTINUED | OUTPATIENT
Start: 2021-09-15 | End: 2021-09-17

## 2021-09-15 RX ORDER — INSULIN GLARGINE 100 [IU]/ML
15 INJECTION, SOLUTION SUBCUTANEOUS AT BEDTIME
Refills: 0 | Status: DISCONTINUED | OUTPATIENT
Start: 2021-09-15 | End: 2021-09-15

## 2021-09-15 RX ORDER — INSULIN LISPRO 100/ML
VIAL (ML) SUBCUTANEOUS AT BEDTIME
Refills: 0 | Status: DISCONTINUED | OUTPATIENT
Start: 2021-09-15 | End: 2021-09-17

## 2021-09-15 RX ADMIN — Medication 5 MILLIGRAM(S): at 22:07

## 2021-09-15 RX ADMIN — Medication 1: at 12:05

## 2021-09-15 RX ADMIN — Medication 15 MILLIGRAM(S): at 19:25

## 2021-09-15 RX ADMIN — FENTANYL CITRATE 50 MICROGRAM(S): 50 INJECTION INTRAVENOUS at 18:00

## 2021-09-15 RX ADMIN — Medication 500 MILLIGRAM(S): at 06:52

## 2021-09-15 RX ADMIN — FENTANYL CITRATE 25 MICROGRAM(S): 50 INJECTION INTRAVENOUS at 19:47

## 2021-09-15 RX ADMIN — DONEPEZIL HYDROCHLORIDE 10 MILLIGRAM(S): 10 TABLET, FILM COATED ORAL at 22:07

## 2021-09-15 RX ADMIN — FENTANYL CITRATE 25 MICROGRAM(S): 50 INJECTION INTRAVENOUS at 19:11

## 2021-09-15 RX ADMIN — PANTOPRAZOLE SODIUM 40 MILLIGRAM(S): 20 TABLET, DELAYED RELEASE ORAL at 12:06

## 2021-09-15 RX ADMIN — Medication 400 MILLIGRAM(S): at 22:07

## 2021-09-15 RX ADMIN — Medication 1000 MILLIGRAM(S): at 22:34

## 2021-09-15 RX ADMIN — Medication 1: at 08:08

## 2021-09-15 RX ADMIN — FENTANYL CITRATE 25 MICROGRAM(S): 50 INJECTION INTRAVENOUS at 17:10

## 2021-09-15 NOTE — CHART NOTE - NSCHARTNOTEFT_GEN_A_CORE
Spoke with daughter regarding pathology results from ERCP - benign findings. Planning for cholecystectomy today. Recommend follow-up in my office in 2-3 weeks eventually upon discharge and will arrange biliary stent removal at that time.

## 2021-09-15 NOTE — PROGRESS NOTE ADULT - PROBLEM SELECTOR PLAN 9
hold dvt prophylaxis for now as pt scheduled for surgery lap tiffanie  resume after procedure if ok with GI - lovenox 40 sq qd

## 2021-09-15 NOTE — PROGRESS NOTE ADULT - PROBLEM SELECTOR PLAN 7
on janumet and glimeperide, came in with sugars 400s. Likely d/t pancreatitis, and beta cell transient dysfunction.  A1C 9/10 8.1  FS ACHS  maintain fs 140-180  BG ranging 190s-200s yesterday 9/13  On Lantus 12, determine ISS based on BG readings  Continue to monitor BG and sliding scale as needed on janumet and glimeperide, came in with sugars 400s. Likely d/t pancreatitis, and beta cell transient dysfunction.  A1C 9/10 8.1  FS ACHS  maintain fs 140-180

## 2021-09-15 NOTE — PROGRESS NOTE ADULT - PROBLEM SELECTOR PLAN 2
recent dementia on donepezil- follows with Dr. Moss outpatient  needs frequent reorientation  Daughters visit daily to help reorient patient for medication compliance  Daughters HCP have made decision to stay until she is s/p darci moreno recent dementia on donepezil- follows with Dr. Moss outpatient  needs frequent reorientation  Daughters visit daily to help reorient patient for medication compliance  Consent for surgery to be obtained by POA daughter Jodee recent dementia on donepezil- follows with Dr. Moss outpatient  needs frequent reorientation  Daughters visit daily to help reorient patient for medication compliance

## 2021-09-15 NOTE — PROGRESS NOTE ADULT - PROBLEM SELECTOR PLAN 4
On admission Mid epigastric pain, jaundice, tenderness on exam. Lipase 12K CT shows  possible obstructive stone in the ampulla  Sx consistent with gallstone pancreatitis, lipase is now 4000s from 07926  - Clear liquids ok'd by GI POD 1 now advanced to soft  - post ERCP lipase downtrending and continue to monitor for pain  - Tramadol 25 q8 + Tylenol 325 q12 prn for pain control   - s/p 1 L bolus in ED  - Currently Resolved. On admission Mid epigastric pain, jaundice, tenderness on exam. Lipase 12K CT shows  possible obstructive stone in the ampulla    - post ERCP lipase downtrending and continue to monitor for pain  - Tramadol 25 q8 + Tylenol 325 q12 prn for pain control   - Currently Resolved.

## 2021-09-15 NOTE — PROGRESS NOTE ADULT - ASSESSMENT
81y.o. Female s/p lap tiffanie    -Pain control prn  -Diet as tolerated  -DVT ppx  -Incentive spirometry  -Resume pre-op meds

## 2021-09-15 NOTE — PROGRESS NOTE ADULT - SUBJECTIVE AND OBJECTIVE BOX
Surgery    Subjective:  Pt resting comfortably. Unaware she had surgery.  Per nurse, pt was combative after transfer back to floor. After sleeping, pt calmed down.  No acute complaints.    T(C): 36.9 (09-15-21 @ 21:00), Max: 36.9 (09-15-21 @ 21:00)  HR: 63 (09-15-21 @ 21:00) (48 - 76)  BP: 148/50 (09-15-21 @ 21:00) (125/67 - 196/67)  RR: 18 (09-15-21 @ 21:00) (13 - 21)  SpO2: 100% (09-15-21 @ 21:00) (94% - 100%)    Physical:  Gen: NAD  Abd: Soft ND, Dressing C/D/I

## 2021-09-15 NOTE — PROGRESS NOTE ADULT - PROBLEM SELECTOR PLAN 6
on home losartan hctz nifedipine  hold all htn meds for now  patient refusing vitals this AM  monitor bp  resume as needed on home losartan hctz nifedipine  hold all htn meds for now  /65 this AM  Monitor   Resume as needed s/p lap tiffanie

## 2021-09-15 NOTE — PROGRESS NOTE ADULT - PROBLEM SELECTOR PLAN 3
Admitted for obstructive jaundice  Plan: On admission, Bilirubin 10.4, INR 1.15  Liver function with obstructive pattern - likely choledocholithiasis, cholangitis  - s/p ERCP Thursday 9/9  - C/w Ceftin BID, flagyl TID EOT 9/17/2021  - GI following, recs appreciated  - started on  soft-continue  -  Pathology resuts: Ampulla biopsy shows acutely inflamed, partially cauterized glandular epithelium with focal  reactive cellular atypia in a background of blood clots and mucin.  - Surgery today 9/15 Dr. Rafat moreno Admitted for obstructive jaundice  Plan: On admission, Bilirubin 10.4, INR 1.15  Liver function with obstructive pattern - likely choledocholithiasis, cholangitis  - s/p ERCP Thursday 9/9  - C/w Ceftin BID, flagyl TID EOT 9/17/2021  - GI following, recs appreciated  - started on  soft-continue  -  Pathology resuts: Ampulla biopsy shows acutely inflamed, partially cauterized glandular epithelium with focal  reactive cellular atypia in a background of blood clots and mucin.  - Surgery today 9/15 Dr. Douglass laparoscopic cholecystectomy with possible intra-operative cholangiogram Admitted for obstructive jaundice  Plan: On admission, Bilirubin 10.4, INR 1.15  Liver function with obstructive pattern - likely choledocholithiasis, cholangitis  - s/p ERCP Thursday 9/9  - C/w Ceftin BID, flagyl TID EOT 9/17/2021  - GI following, recs appreciated  - NPO since midnight  - Surgery following  - Pathology results: Ampulla biopsy shows acutely inflamed, partially cauterized glandular epithelium with focal  reactive cellular atypia in a background of blood clots and mucin.  - Surgery today 9/15 Dr. Douglass laparoscopic cholecystectomy with possible intra-operative cholangiogram

## 2021-09-15 NOTE — PROGRESS NOTE ADULT - NUTRITIONAL ASSESSMENT
Diet, Dysphagia 2 Mechanical Soft-Honey Consistency Fluid:   Consistent Carbohydrate {No Snacks} (09-13-21 @ 14:39) [Active]

## 2021-09-15 NOTE — PROGRESS NOTE ADULT - ATTENDING COMMENTS
Abdominal pain resolved, Pathology- negative for cancer  Lap Cholecystectomy today- patient will be transferred to surgery post-op

## 2021-09-15 NOTE — PROGRESS NOTE ADULT - ASSESSMENT
81F, ambulates independently, lives alone at home with [id number] (on 2nd floor, daughter Jodee  lives on 1st floor, daughter Delfina  lives 2 houses down) with PMH HTN, DM, PSH Diverticulitis needing exlap, p/w progressive jaundice x 1 week admitted for obstructive jaundice

## 2021-09-15 NOTE — PROGRESS NOTE ADULT - SUBJECTIVE AND OBJECTIVE BOX
PGY-1 Progress Note discussed with attending    PAGER #: [[phone]] TILL 5:00 PM  PLEASE CONTACT ON CALL TEAM:  - On Call Team (Please refer to Vicky) FROM 5:00 PM - 8:30PM  - Nightfloat Team FROM 8:30 -7:30 AM    CHIEF COMPLAINT & BRIEF HOSPITAL COURSE:  81F, ambulates independently, lives alone at home with HHA [id number] (on 2nd floor, daughter Jodee  lives on 1st floor, daughter  (HPI historian)  Delfina  lives 2 houses down) with PMH HTN, DM, PSH Diverticulitis needing exlap.    She p/w progressive jaundice x 1 week worsened over 2 days prior to admission. She was overtly yellow on the day of admission which prompted ED visit.     Pt has been complaining about intermittent epigastric postprandial pain. SX are associated with chills, tactile fevers, pruritus, elevated home FS sugars and decreased PO intake and subsequent 8lb weight loss in the last 2 weeks      Pt is recently diagnosed with dementia x1yr since getting COVID in 2020. As per family, patient has outbursts of anger and has poor short term memory during the interview.    In the ED: AFVSS, On  Exam: epigastric tenderness and jaundice on face.  Labs: WCBC 15, Hgb 10.3, Na 131, Cr 1.56, glucose 400s Bili 10.4, Alk phos 640 ALST 250  , lipase [id number]  Urine contaminated  Hepatitis panel non reactive  CT Abd- dilated CBD, Dilated pancreatic duct, stone in ampulla and small stones in the galbladder.    She was admitted for obstructive jaundice and work up of choledocholithiasis vs cholangitis, and for subsequent gal stone pancreatitis.      INTERVAL HPI/OVERNIGHT EVENTS:   S/p ERCP stone removal and stent placement  ON ABX PO, needs frequent reorientation   On enhanced surveillance  Patient was seen and examined at bedside       MEDICATIONS  (STANDING):  cefuroxime   Tablet 500 milliGRAM(s) Oral every 12 hours  dextrose 5%. 1000 milliLiter(s) (100 mL/Hr) IV Continuous <Continuous>  donepezil 10 milliGRAM(s) Oral at bedtime  glucagon  Injectable 1 milliGRAM(s) IntraMuscular once  heparin   Injectable 5000 Unit(s) SubCutaneous every 8 hours  insulin glargine Injectable (LANTUS) 12 Unit(s) SubCutaneous at bedtime  insulin lispro (ADMELOG) corrective regimen sliding scale   SubCutaneous three times a day before meals  insulin lispro (ADMELOG) corrective regimen sliding scale   SubCutaneous at bedtime  insulin lispro Injectable (ADMELOG) 3 Unit(s) SubCutaneous before lunch  lactated ringers. 1000 milliLiter(s) (150 mL/Hr) IV Continuous <Continuous>  losartan 50 milliGRAM(s) Oral daily  melatonin 5 milliGRAM(s) Oral at bedtime  metroNIDAZOLE    Tablet 500 milliGRAM(s) Oral three times a day  pantoprazole  Injectable 40 milliGRAM(s) IV Push daily    MEDICATIONS  (PRN):  acetaminophen   Tablet .. 325 milliGRAM(s) Oral every 12 hours PRN Temp greater or equal to 38C (100.4F), Mild Pain (1 - 3)  traMADol 25 milliGRAM(s) Oral every 8 hours PRN Severe Pain (7 - 10)        REVIEW OF SYSTEMS:  CONSTITUTIONAL: No weakness, fevers or chills  EYES/ENT: No visual changes;  No vertigo or throat pain   NECK: No pain or stiffness  RESPIRATORY: No cough, wheezing, hemoptysis; No shortness of breath  CARDIOVASCULAR: No chest pain or palpitations  GASTROINTESTINAL: No abdominal or epigastric pain. No nausea, vomiting, or hematemesis; No diarrhea or constipation. No melena or hematochezia.  GENITOURINARY: No dysuria, frequency or hematuria  NEUROLOGICAL: No numbness or weakness  SKIN: No itching, rashes    Vital Signs Last 24 Hrs  T(C): 36.7 (14 Sep 2021 14:15), Max: 36.8 (13 Sep 2021 19:37)  T(F): 98 (14 Sep 2021 14:15), Max: 98.2 (13 Sep 2021 19:37)  HR: 63 (14 Sep 2021 14:15) (52 - 63)  BP: 137/60 (14 Sep 2021 14:15) (134/62 - 137/60)  BP(mean): --  RR: 18 (14 Sep 2021 14:15) (17 - 18)  SpO2: 98% (14 Sep 2021 14:15) (97% - 98%)    PHYSICAL EXAMINATION:  GENERAL: NAD, reclining in bed  HEAD:  Atraumatic, Normocephalic  EYES: anicteric, no conjunctival erythema  NECK: Supple, No JVD  CHEST/LUNG: Clear to auscultation bilaterally; No rales, rhonchi, wheezing, or rubs. Unlabored respirations  HEART: Regular rate and rhythm; No murmurs, rubs, or gallops  ABDOMEN: Bowel sounds present; Soft, Nontender, Nondistended.   EXTREMITIES:  2+ Peripheral Pulses. No clubbing, cyanosis, or edema  NERVOUS SYSTEM:  Alert & Oriented X1, speech clear. No focal deficits   SKIN: No rashes or lesions               09-14    137  |  104  |  11  ----------------------------<  215<H>  4.4   |  29  |  0.94    Ca    8.6      14 Sep 2021 08:17  Phos  3.1     09-14  Mg     1.8     09-14    TPro  7.1  /  Alb  2.6<L>  /  TBili  1.7<H>  /  DBili  x   /  AST  25  /  ALT  56  /  AlkPhos  304<H>  09-14                            9.8    7.26  )-----------( 361      ( 14 Sep 2021 08:17 )             30.5                PGY-1 Progress Note discussed with attending    PAGER #: [[phone]] TILL 5:00 PM  PLEASE CONTACT ON CALL TEAM:  - On Call Team (Please refer to Vicky) FROM 5:00 PM - 8:30PM  - Nightfloat Team FROM 8:30 -7:30 AM    CHIEF COMPLAINT & BRIEF HOSPITAL COURSE:  81F, ambulates independently, lives alone at home with HHA [id number] (on 2nd floor, daughter Jodee  lives on 1st floor, daughter  (HPI historian)  Delfian  lives 2 houses down) with PMH HTN, DM, PSH Diverticulitis needing exlap.    She p/w progressive jaundice x 1 week worsened over 2 days prior to admission. She was overtly yellow on the day of admission which prompted ED visit.     Pt has been complaining about intermittent epigastric postprandial pain. SX are associated with chills, tactile fevers, pruritus, elevated home FS sugars and decreased PO intake and subsequent 8lb weight loss in the last 2 weeks      Pt is recently diagnosed with dementia x1yr since getting COVID in 2020. As per family, patient has outbursts of anger and has poor short term memory during the interview.    In the ED: AFVSS, On  Exam: epigastric tenderness and jaundice on face.  Labs: WCBC 15, Hgb 10.3, Na 131, Cr 1.56, glucose 400s Bili 10.4, Alk phos 640 ALST 250  , lipase [id number]  Urine contaminated  Hepatitis panel non reactive  CT Abd- dilated CBD, Dilated pancreatic duct, stone in ampulla and small stones in the galbladder.    She was admitted for obstructive jaundice and work up of choledocholithiasis vs cholangitis, and for subsequent gal stone pancreatitis.      INTERVAL HPI/OVERNIGHT EVENTS:   S/p ERCP stone removal and stent placement  ON ABX PO, needs frequent reorientation   On enhanced surveillance  Patient was seen and examined at bedside  Patient NAD, no new complaints       MEDICATIONS  (STANDING):  cefuroxime   Tablet 500 milliGRAM(s) Oral every 12 hours  dextrose 5%. 1000 milliLiter(s) (100 mL/Hr) IV Continuous <Continuous>  donepezil 10 milliGRAM(s) Oral at bedtime  glucagon  Injectable 1 milliGRAM(s) IntraMuscular once  heparin   Injectable 5000 Unit(s) SubCutaneous every 8 hours  insulin glargine Injectable (LANTUS) 12 Unit(s) SubCutaneous at bedtime  insulin lispro (ADMELOG) corrective regimen sliding scale   SubCutaneous three times a day before meals  insulin lispro (ADMELOG) corrective regimen sliding scale   SubCutaneous at bedtime  insulin lispro Injectable (ADMELOG) 3 Unit(s) SubCutaneous before lunch  lactated ringers. 1000 milliLiter(s) (150 mL/Hr) IV Continuous <Continuous>  losartan 50 milliGRAM(s) Oral daily  melatonin 5 milliGRAM(s) Oral at bedtime  metroNIDAZOLE    Tablet 500 milliGRAM(s) Oral three times a day  pantoprazole  Injectable 40 milliGRAM(s) IV Push daily    MEDICATIONS  (PRN):  acetaminophen   Tablet .. 325 milliGRAM(s) Oral every 12 hours PRN Temp greater or equal to 38C (100.4F), Mild Pain (1 - 3)  traMADol 25 milliGRAM(s) Oral every 8 hours PRN Severe Pain (7 - 10)        REVIEW OF SYSTEMS:  CONSTITUTIONAL: No weakness, fevers or chills  EYES/ENT: No visual changes;  No vertigo or throat pain   NECK: No pain or stiffness  RESPIRATORY: No cough, wheezing, hemoptysis; No shortness of breath  CARDIOVASCULAR: No chest pain or palpitations  GASTROINTESTINAL: No abdominal or epigastric pain. No nausea, vomiting, or hematemesis; No diarrhea or constipation. No melena or hematochezia.  GENITOURINARY: No dysuria, frequency or hematuria  NEUROLOGICAL: No numbness or weakness  SKIN: No itching, rashes    Vital Signs Last 24 Hrs  T(C): 36.7 (14 Sep 2021 14:15), Max: 36.8 (13 Sep 2021 19:37)  T(F): 98 (14 Sep 2021 14:15), Max: 98.2 (13 Sep 2021 19:37)  HR: 63 (14 Sep 2021 14:15) (52 - 63)  BP: 137/60 (14 Sep 2021 14:15) (134/62 - 137/60)  BP(mean): --  RR: 18 (14 Sep 2021 14:15) (17 - 18)  SpO2: 98% (14 Sep 2021 14:15) (97% - 98%)    PHYSICAL EXAMINATION:  GENERAL: NAD, reclining in bed  HEAD:  Atraumatic, Normocephalic  EYES: anicteric, no conjunctival erythema  NECK: Supple, No JVD  CHEST/LUNG: Clear to auscultation bilaterally; No rales, rhonchi, wheezing, or rubs. Unlabored respirations  HEART: Regular rate and rhythm; No murmurs, rubs, or gallops  ABDOMEN: Bowel sounds present; Soft, Nontender, Nondistended.   EXTREMITIES:  2+ Peripheral Pulses. No clubbing, cyanosis, or edema  NERVOUS SYSTEM:  Alert & Oriented X1, speech clear. No focal deficits   SKIN: No rashes or lesions               09-14    137  |  104  |  11  ----------------------------<  215<H>  4.4   |  29  |  0.94    Ca    8.6      14 Sep 2021 08:17  Phos  3.1     09-14  Mg     1.8     09-14    TPro  7.1  /  Alb  2.6<L>  /  TBili  1.7<H>  /  DBili  x   /  AST  25  /  ALT  56  /  AlkPhos  304<H>  09-14                            9.8    7.26  )-----------( 361      ( 14 Sep 2021 08:17 )             30.5                PGY-1 Progress Note discussed with attending    PAGER #: [[phone]] TILL 5:00 PM  PLEASE CONTACT ON CALL TEAM:  - On Call Team (Please refer to Vicky) FROM 5:00 PM - 8:30PM  - Nightfloat Team FROM 8:30 -7:30 AM    CHIEF COMPLAINT & BRIEF HOSPITAL COURSE:  81F, ambulates independently, lives alone at home with HHA [id number] (on 2nd floor, daughter Jodee  lives on 1st floor, daughter  (HPI historian)  Delfina  lives 2 houses down) with PMH HTN, DM, PSH Diverticulitis needing exlap.    She p/w progressive jaundice x 1 week worsened over 2 days prior to admission. She was overtly yellow on the day of admission which prompted ED visit.     Pt has been complaining about intermittent epigastric postprandial pain. SX are associated with chills, tactile fevers, pruritus, elevated home FS sugars and decreased PO intake and subsequent 8lb weight loss in the last 2 weeks      Pt is recently diagnosed with dementia x1yr since getting COVID in 2020. As per family, patient has outbursts of anger and has poor short term memory during the interview.    In the ED: AFVSS, On  Exam: epigastric tenderness and jaundice on face.  Labs: WCBC 15, Hgb 10.3, Na 131, Cr 1.56, glucose 400s Bili 10.4, Alk phos 640 ALST 250  , lipase [id number]  Urine contaminated  Hepatitis panel non reactive  CT Abd- dilated CBD, Dilated pancreatic duct, stone in ampulla and small stones in the galbladder.    She was admitted for obstructive jaundice and work up of choledocholithiasis vs cholangitis, and for subsequent gal stone pancreatitis.      INTERVAL HPI/OVERNIGHT EVENTS:   S/p ERCP stone removal and stent placement  ON ABX PO, needs frequent reorientation   On enhanced surveillance  Nurse reports patient has repeatedly ripped out her IV and refused labs  Patient was seen and examined at bedside  Patient NAD, no new complaints  Daughters to arrive this AM for surgery consent     MEDICATIONS  (STANDING):  cefuroxime   Tablet 500 milliGRAM(s) Oral every 12 hours  dextrose 5%. 1000 milliLiter(s) (100 mL/Hr) IV Continuous <Continuous>  donepezil 10 milliGRAM(s) Oral at bedtime  glucagon  Injectable 1 milliGRAM(s) IntraMuscular once  heparin   Injectable 5000 Unit(s) SubCutaneous every 8 hours  insulin glargine Injectable (LANTUS) 12 Unit(s) SubCutaneous at bedtime  insulin lispro (ADMELOG) corrective regimen sliding scale   SubCutaneous three times a day before meals  insulin lispro (ADMELOG) corrective regimen sliding scale   SubCutaneous at bedtime  insulin lispro Injectable (ADMELOG) 3 Unit(s) SubCutaneous before lunch  lactated ringers. 1000 milliLiter(s) (150 mL/Hr) IV Continuous <Continuous>  losartan 50 milliGRAM(s) Oral daily  melatonin 5 milliGRAM(s) Oral at bedtime  metroNIDAZOLE    Tablet 500 milliGRAM(s) Oral three times a day  pantoprazole  Injectable 40 milliGRAM(s) IV Push daily    MEDICATIONS  (PRN):  acetaminophen   Tablet .. 325 milliGRAM(s) Oral every 12 hours PRN Temp greater or equal to 38C (100.4F), Mild Pain (1 - 3)  traMADol 25 milliGRAM(s) Oral every 8 hours PRN Severe Pain (7 - 10)        REVIEW OF SYSTEMS:  CONSTITUTIONAL: No weakness, fevers or chills  EYES/ENT: No visual changes;  No vertigo or throat pain   NECK: No pain or stiffness  RESPIRATORY: No cough, wheezing, hemoptysis; No shortness of breath  CARDIOVASCULAR: No chest pain or palpitations  GASTROINTESTINAL: No abdominal or epigastric pain. No nausea, vomiting, or hematemesis; No diarrhea or constipation. No melena or hematochezia.  GENITOURINARY: No dysuria, frequency or hematuria  NEUROLOGICAL: No numbness or weakness  SKIN: No itching, rashes    Vital Signs Last 24 Hrs  T(C): 36.7 (14 Sep 2021 14:15), Max: 36.8 (13 Sep 2021 19:37)  T(F): 98 (14 Sep 2021 14:15), Max: 98.2 (13 Sep 2021 19:37)  HR: 63 (14 Sep 2021 14:15) (52 - 63)  BP: 137/60 (14 Sep 2021 14:15) (134/62 - 137/60)  BP(mean): --  RR: 18 (14 Sep 2021 14:15) (17 - 18)  SpO2: 98% (14 Sep 2021 14:15) (97% - 98%)    PHYSICAL EXAMINATION:  GENERAL: NAD, reclining in bed  HEAD:  Atraumatic, Normocephalic  EYES: anicteric, no conjunctival erythema  NECK: Supple, No JVD  CHEST/LUNG: Clear to auscultation bilaterally; No rales, rhonchi, wheezing, or rubs. Unlabored respirations  HEART: Regular rate and rhythm; No murmurs, rubs, or gallops  ABDOMEN: Bowel sounds present; Soft, Nontender, Nondistended.   EXTREMITIES:  2+ Peripheral Pulses. No clubbing, cyanosis, or edema  NERVOUS SYSTEM:  Alert & Oriented X1, speech clear. No focal deficits   SKIN: No rashes or lesions               09-14    137  |  104  |  11  ----------------------------<  215<H>  4.4   |  29  |  0.94    Ca    8.6      14 Sep 2021 08:17  Phos  3.1     09-14  Mg     1.8     09-14    TPro  7.1  /  Alb  2.6<L>  /  TBili  1.7<H>  /  DBili  x   /  AST  25  /  ALT  56  /  AlkPhos  304<H>  09-14                            9.8    7.26  )-----------( 361      ( 14 Sep 2021 08:17 )             30.5                PGY-1 Progress Note discussed with attending    PAGER #: [[phone]] TILL 5:00 PM  PLEASE CONTACT ON CALL TEAM:  - On Call Team (Please refer to Vicky) FROM 5:00 PM - 8:30PM  - Nightfloat Team FROM 8:30 -7:30 AM    CHIEF COMPLAINT & BRIEF HOSPITAL COURSE:  81F, ambulates independently, lives alone at home with HHA [id number] (on 2nd floor, daughter Jodee  lives on 1st floor, daughter  (HPI historian)  Delfina  lives 2 houses down) with PMH HTN, DM, PSH Diverticulitis needing exlap.    She p/w progressive jaundice x 1 week worsened over 2 days prior to admission. She was overtly yellow on the day of admission which prompted ED visit.     Pt has been complaining about intermittent epigastric postprandial pain. SX are associated with chills, tactile fevers, pruritus, elevated home FS sugars and decreased PO intake and subsequent 8lb weight loss in the last 2 weeks      Pt is recently diagnosed with dementia x1yr since getting COVID in 2020. As per family, patient has outbursts of anger and has poor short term memory during the interview.    In the ED: AFVSS, On  Exam: epigastric tenderness and jaundice on face.  Labs: WCBC 15, Hgb 10.3, Na 131, Cr 1.56, glucose 400s Bili 10.4, Alk phos 640 ALST 250  , lipase [id number]  Urine contaminated  Hepatitis panel non reactive  CT Abd- dilated CBD, Dilated pancreatic duct, stone in ampulla and small stones in the galbladder.    She was admitted for obstructive jaundice and work up of choledocholithiasis vs cholangitis, and for subsequent gal stone pancreatitis.      INTERVAL HPI/OVERNIGHT EVENTS:   S/p ERCP stone removal and stent placement  ON ABX PO, needs frequent reorientation   On enhanced surveillance  Nurse reports patient has repeatedly ripped out her IV and refused labs  Patient was seen and examined at bedside  Patient NAD, no new complaints  Daughters to arrive this AM for surgery consent  Pt was evaluated and transferred for surgery.      MEDICATIONS  (STANDING):  cefuroxime   Tablet 500 milliGRAM(s) Oral every 12 hours  dextrose 5%. 1000 milliLiter(s) (100 mL/Hr) IV Continuous <Continuous>  donepezil 10 milliGRAM(s) Oral at bedtime  glucagon  Injectable 1 milliGRAM(s) IntraMuscular once  heparin   Injectable 5000 Unit(s) SubCutaneous every 8 hours  insulin glargine Injectable (LANTUS) 15 Unit(s) SubCutaneous at bedtime  insulin lispro (ADMELOG) corrective regimen sliding scale   SubCutaneous three times a day before meals  insulin lispro (ADMELOG) corrective regimen sliding scale   SubCutaneous at bedtime  insulin lispro Injectable (ADMELOG) 3 Unit(s) SubCutaneous three times a day before meals  lactated ringers. 1000 milliLiter(s) (150 mL/Hr) IV Continuous <Continuous>  losartan 50 milliGRAM(s) Oral daily  melatonin 5 milliGRAM(s) Oral at bedtime  metroNIDAZOLE    Tablet 500 milliGRAM(s) Oral three times a day  NIFEdipine XL 30 milliGRAM(s) Oral daily  pantoprazole  Injectable 40 milliGRAM(s) IV Push daily    MEDICATIONS  (PRN):  acetaminophen   Tablet .. 325 milliGRAM(s) Oral every 12 hours PRN Temp greater or equal to 38C (100.4F), Mild Pain (1 - 3)  traMADol 25 milliGRAM(s) Oral every 8 hours PRN Severe Pain (7 - 10)      REVIEW OF SYSTEMS:  CONSTITUTIONAL: No weakness, fevers or chills  EYES/ENT: No visual changes;  No vertigo or throat pain   NECK: No pain or stiffness  RESPIRATORY: No cough, wheezing, hemoptysis; No shortness of breath  CARDIOVASCULAR: No chest pain or palpitations  GASTROINTESTINAL: No abdominal or epigastric pain. No nausea, vomiting, or hematemesis; No diarrhea or constipation. No melena or hematochezia.  GENITOURINARY: No dysuria, frequency or hematuria  NEUROLOGICAL: No numbness or weakness  SKIN: No itching, rashes    Vital Signs Last 24 Hrs  T(C): 36.8 (15 Sep 2021 13:24), Max: 36.8 (14 Sep 2021 21:07)  T(F): 98.2 (15 Sep 2021 13:24), Max: 98.3 (14 Sep 2021 21:07)  HR: 52 (15 Sep 2021 13:24) (48 - 63)  BP: 125/67 (15 Sep 2021 13:24) (125/67 - 153/65)  BP(mean): --  RR: 18 (15 Sep 2021 13:24) (16 - 18)  SpO2: 94% (15 Sep 2021 13:24) (94% - 98%)    PHYSICAL EXAMINATION:  GENERAL: NAD, reclining in bed  HEAD:  Atraumatic, Normocephalic  EYES: anicteric, no conjunctival erythema  NECK: Supple, No JVD  CHEST/LUNG: Clear to auscultation bilaterally; No rales, rhonchi, wheezing, or rubs. Unlabored respirations  HEART: Regular rate and rhythm; No murmurs, rubs, or gallops  ABDOMEN: Bowel sounds present; Soft, Nontender, Nondistended.   EXTREMITIES:  2+ Peripheral Pulses. No clubbing, cyanosis, or edema  NERVOUS SYSTEM:  Alert & Oriented X1, speech clear. No focal deficits   SKIN: No rashes or lesions               09-14    137  |  104  |  11  ----------------------------<  215<H>  4.4   |  29  |  0.94    Ca    8.6      14 Sep 2021 08:17  Phos  3.1     09-14  Mg     1.8     09-14    TPro  7.1  /  Alb  2.6<L>  /  TBili  1.7<H>  /  DBili  x   /  AST  25  /  ALT  56  /  AlkPhos  304<H>  09-14                            9.8    7.26  )-----------( 361      ( 14 Sep 2021 08:17 )             30.5

## 2021-09-16 LAB
GLUCOSE BLDC GLUCOMTR-MCNC: 221 MG/DL — HIGH (ref 70–99)
GLUCOSE BLDC GLUCOMTR-MCNC: 283 MG/DL — HIGH (ref 70–99)
GLUCOSE BLDC GLUCOMTR-MCNC: 294 MG/DL — HIGH (ref 70–99)

## 2021-09-16 RX ADMIN — ENOXAPARIN SODIUM 40 MILLIGRAM(S): 100 INJECTION SUBCUTANEOUS at 11:51

## 2021-09-16 RX ADMIN — Medication 15 MILLIGRAM(S): at 01:37

## 2021-09-16 RX ADMIN — Medication 1000 MILLIGRAM(S): at 10:00

## 2021-09-16 RX ADMIN — PANTOPRAZOLE SODIUM 40 MILLIGRAM(S): 20 TABLET, DELAYED RELEASE ORAL at 11:51

## 2021-09-16 RX ADMIN — Medication 15 MILLIGRAM(S): at 01:08

## 2021-09-16 RX ADMIN — Medication 15 MILLIGRAM(S): at 17:19

## 2021-09-16 RX ADMIN — Medication 15 MILLIGRAM(S): at 12:10

## 2021-09-16 RX ADMIN — Medication 15 MILLIGRAM(S): at 11:51

## 2021-09-16 RX ADMIN — Medication 15 MILLIGRAM(S): at 17:45

## 2021-09-16 RX ADMIN — Medication 400 MILLIGRAM(S): at 09:34

## 2021-09-16 NOTE — PROGRESS NOTE ADULT - ASSESSMENT
81F POD1 lap cholecystectomy, postop stable overnight  - start lovenox  - diet  - possible DC today  - f/u GI outpatient  - pain control

## 2021-09-16 NOTE — PROGRESS NOTE ADULT - SUBJECTIVE AND OBJECTIVE BOX
INTERVAL HPI/OVERNIGHT EVENTS:  Pt resting comfortably. Still not aware of having surgery.  c/o abd pain, controlled with pain meds.  States voided post op.    MEDICATIONS  (STANDING):  acetaminophen  IVPB .. 1000 milliGRAM(s) IV Intermittent once  dextrose 40% Gel 15 Gram(s) Oral once  dextrose 5%. 1000 milliLiter(s) (50 mL/Hr) IV Continuous <Continuous>  dextrose 5%. 1000 milliLiter(s) (100 mL/Hr) IV Continuous <Continuous>  dextrose 50% Injectable 25 Gram(s) IV Push once  dextrose 50% Injectable 12.5 Gram(s) IV Push once  dextrose 50% Injectable 25 Gram(s) IV Push once  donepezil 10 milliGRAM(s) Oral at bedtime  enoxaparin Injectable 40 milliGRAM(s) SubCutaneous daily  glucagon  Injectable 1 milliGRAM(s) IntraMuscular once  insulin lispro (ADMELOG) corrective regimen sliding scale   SubCutaneous at bedtime  ketorolac   Injectable 15 milliGRAM(s) IV Push every 6 hours  melatonin 5 milliGRAM(s) Oral at bedtime  NIFEdipine XL 30 milliGRAM(s) Oral daily  pantoprazole  Injectable 40 milliGRAM(s) IV Push daily    MEDICATIONS  (PRN):  HYDROmorphone   Tablet 0.5 milliGRAM(s) Oral every 3 hours PRN Severe Pain (7 - 10)    Vital Signs Last 24 Hrs  T(C): 36.5 (16 Sep 2021 05:31), Max: 36.9 (15 Sep 2021 21:00)  T(F): 97.7 (16 Sep 2021 05:31), Max: 98.4 (15 Sep 2021 21:00)  HR: 65 (16 Sep 2021 06:18) (52 - 76)  BP: 178/46 (16 Sep 2021 05:31) (125/67 - 196/67)  BP(mean): 79 (15 Sep 2021 19:45) (67 - 101)  RR: 17 (16 Sep 2021 05:31) (13 - 21)  SpO2: 99% (16 Sep 2021 05:31) (94% - 100%)    Physical:  General: NAD.  Abdomen: Soft nondistended, Dressing C/D/I.    I&O's Detail    15 Sep 2021 07:01  -  16 Sep 2021 07:00  --------------------------------------------------------  IN:    Lactated Ringers Bolus: 800 mL  Total IN: 800 mL    OUT:  Total OUT: 0 mL    Total NET: 800 mL

## 2021-09-16 NOTE — PROGRESS NOTE ADULT - SUBJECTIVE AND OBJECTIVE BOX
81F POD1 laparoscopic cholecystectomy, gallstone pancreatitis    Patient seen at bedside, resting comfortably. Does not remember having surgery.   Pain controlled. Diet resumed. No acute complaints.    Vital Signs Last 24 Hrs  T(C): 36.5 (16 Sep 2021 05:31), Max: 36.9 (15 Sep 2021 21:00)  T(F): 97.7 (16 Sep 2021 05:31), Max: 98.4 (15 Sep 2021 21:00)  HR: 65 (16 Sep 2021 06:18) (52 - 76)  BP: 178/46 (16 Sep 2021 05:31) (125/67 - 196/67)  BP(mean): 79 (15 Sep 2021 19:45) (67 - 101)  RR: 17 (16 Sep 2021 05:31) (13 - 21)  SpO2: 99% (16 Sep 2021 05:31) (94% - 100%)  Gen: NAD  Resp: nonlabored  CV: RRR  Abd: incisional sites w dressing c/d/i, pain appropriate to POD  Neuro: GCS15

## 2021-09-16 NOTE — PROGRESS NOTE ADULT - ASSESSMENT
81y.o. Female s/p lap tiffanie POD#1    -Diet as tolerated  -Stable for d/c from surgical standpoint  -Outpt f/u with Dr. Douglass in office in 1-2 weeks  -May shower. Leave steristrips intact.   -No heavy lifting, straining, exercises for 2 weeks.

## 2021-09-17 ENCOUNTER — TRANSCRIPTION ENCOUNTER (OUTPATIENT)
Age: 81
End: 2021-09-17

## 2021-09-17 VITALS
DIASTOLIC BLOOD PRESSURE: 56 MMHG | OXYGEN SATURATION: 95 % | HEART RATE: 58 BPM | SYSTOLIC BLOOD PRESSURE: 124 MMHG | TEMPERATURE: 98 F | RESPIRATION RATE: 16 BRPM

## 2021-09-17 LAB
GLUCOSE BLDC GLUCOMTR-MCNC: 244 MG/DL — HIGH (ref 70–99)
GLUCOSE BLDC GLUCOMTR-MCNC: 252 MG/DL — HIGH (ref 70–99)

## 2021-09-17 PROCEDURE — 84300 ASSAY OF URINE SODIUM: CPT

## 2021-09-17 PROCEDURE — 88305 TISSUE EXAM BY PATHOLOGIST: CPT

## 2021-09-17 PROCEDURE — 96374 THER/PROPH/DIAG INJ IV PUSH: CPT

## 2021-09-17 PROCEDURE — 88304 TISSUE EXAM BY PATHOLOGIST: CPT

## 2021-09-17 PROCEDURE — 82570 ASSAY OF URINE CREATININE: CPT

## 2021-09-17 PROCEDURE — 85027 COMPLETE CBC AUTOMATED: CPT

## 2021-09-17 PROCEDURE — 85610 PROTHROMBIN TIME: CPT

## 2021-09-17 PROCEDURE — 87635 SARS-COV-2 COVID-19 AMP PRB: CPT

## 2021-09-17 PROCEDURE — 83735 ASSAY OF MAGNESIUM: CPT

## 2021-09-17 PROCEDURE — 87086 URINE CULTURE/COLONY COUNT: CPT

## 2021-09-17 PROCEDURE — 81001 URINALYSIS AUTO W/SCOPE: CPT

## 2021-09-17 PROCEDURE — 36415 COLL VENOUS BLD VENIPUNCTURE: CPT

## 2021-09-17 PROCEDURE — 74177 CT ABD & PELVIS W/CONTRAST: CPT | Mod: MG

## 2021-09-17 PROCEDURE — 82728 ASSAY OF FERRITIN: CPT

## 2021-09-17 PROCEDURE — 83010 ASSAY OF HAPTOGLOBIN QUANT: CPT

## 2021-09-17 PROCEDURE — 83036 HEMOGLOBIN GLYCOSYLATED A1C: CPT

## 2021-09-17 PROCEDURE — 86901 BLOOD TYPING SEROLOGIC RH(D): CPT

## 2021-09-17 PROCEDURE — 86900 BLOOD TYPING SEROLOGIC ABO: CPT

## 2021-09-17 PROCEDURE — 96375 TX/PRO/DX INJ NEW DRUG ADDON: CPT

## 2021-09-17 PROCEDURE — 93005 ELECTROCARDIOGRAM TRACING: CPT

## 2021-09-17 PROCEDURE — 83690 ASSAY OF LIPASE: CPT

## 2021-09-17 PROCEDURE — 80074 ACUTE HEPATITIS PANEL: CPT

## 2021-09-17 PROCEDURE — 84100 ASSAY OF PHOSPHORUS: CPT

## 2021-09-17 PROCEDURE — 85025 COMPLETE CBC W/AUTO DIFF WBC: CPT

## 2021-09-17 PROCEDURE — C1889: CPT

## 2021-09-17 PROCEDURE — 76000 FLUOROSCOPY <1 HR PHYS/QHP: CPT

## 2021-09-17 PROCEDURE — 82248 BILIRUBIN DIRECT: CPT

## 2021-09-17 PROCEDURE — 82962 GLUCOSE BLOOD TEST: CPT

## 2021-09-17 PROCEDURE — 88312 SPECIAL STAINS GROUP 1: CPT

## 2021-09-17 PROCEDURE — 84484 ASSAY OF TROPONIN QUANT: CPT

## 2021-09-17 PROCEDURE — 99233 SBSQ HOSP IP/OBS HIGH 50: CPT | Mod: GC

## 2021-09-17 PROCEDURE — 85730 THROMBOPLASTIN TIME PARTIAL: CPT

## 2021-09-17 PROCEDURE — 86850 RBC ANTIBODY SCREEN: CPT

## 2021-09-17 PROCEDURE — G1004: CPT

## 2021-09-17 PROCEDURE — C1874: CPT

## 2021-09-17 PROCEDURE — 99285 EMERGENCY DEPT VISIT HI MDM: CPT

## 2021-09-17 PROCEDURE — 97161 PT EVAL LOW COMPLEX 20 MIN: CPT

## 2021-09-17 PROCEDURE — 80053 COMPREHEN METABOLIC PANEL: CPT

## 2021-09-17 RX ADMIN — Medication 30 MILLIGRAM(S): at 05:49

## 2021-09-17 NOTE — PROGRESS NOTE ADULT - REASON FOR ADMISSION
Jaundice

## 2021-09-17 NOTE — DISCHARGE NOTE NURSING/CASE MANAGEMENT/SOCIAL WORK - NSDCVIVACCINE_GEN_ALL_CORE_FT
influenza, injectable, quadrivalent, preservative free; 09-Dec-2014 09:25; Ashely Landaverde (RN); [employer]; [id number]; IntraMuscular; Deltoid Right.; 0.5 milliLiter(s);

## 2021-09-17 NOTE — DISCHARGE NOTE NURSING/CASE MANAGEMENT/SOCIAL WORK - NSDCPEFALRISK_GEN_ALL_CORE
For information on Fall & injury Prevention, visit [email or web address]

## 2021-09-17 NOTE — DISCHARGE NOTE NURSING/CASE MANAGEMENT/SOCIAL WORK - PATIENT PORTAL LINK FT
You can access the FollowMyHealth Patient Portal offered by [facility] by registering at the following website: [email or web address]. By joining [employer]’s FollowMyHealth portal, you will also be able to view your health information using other applications (apps) compatible with our system.

## 2021-09-17 NOTE — PROGRESS NOTE ADULT - PROVIDER SPECIALTY LIST ADULT
Surgery
Infectious Disease
Surgery
Gastroenterology
Surgery
Infectious Disease
Surgery
Internal Medicine
Hospitalist
Internal Medicine
Hospitalist

## 2021-09-17 NOTE — PROGRESS NOTE ADULT - SUBJECTIVE AND OBJECTIVE BOX
81F POD2 laparoscopic cholecystectomy, gallstone pancreatitis    Patient seen at bedside, resting comfortably. Does not remember having surgery.   Pain controlled. Diet resumed. No acute complaints.     Vital Signs Last 24 Hrs  T(C): 36.9 (17 Sep 2021 05:24), Max: 37.1 (16 Sep 2021 21:08)  T(F): 98.5 (17 Sep 2021 05:24), Max: 98.8 (16 Sep 2021 21:08)  HR: 56 (17 Sep 2021 05:24) (50 - 56)  BP: 153/45 (17 Sep 2021 05:24) (116/50 - 153/45)  BP(mean): --  RR: 16 (17 Sep 2021 05:24) (16 - 16)  SpO2: 98% (17 Sep 2021 05:24) (97% - 98%)  Gen: NAD  Resp: nonlabored  CV: RRR  Abd: incisional sites w dressing c/d/i, pain appropriate to POD  Neuro: GCS15

## 2021-09-17 NOTE — PROGRESS NOTE ADULT - ASSESSMENT
81F POD2 lap cholecystectomy, postop stable overnight  - home with home health aid today  - diet  - f/u GI outpatient  - pain control

## 2021-09-22 LAB — SURGICAL PATHOLOGY STUDY: SIGNIFICANT CHANGE UP

## 2021-09-28 ENCOUNTER — NON-APPOINTMENT (OUTPATIENT)
Age: 81
End: 2021-09-28

## 2021-10-01 ENCOUNTER — APPOINTMENT (OUTPATIENT)
Dept: GASTROENTEROLOGY | Facility: CLINIC | Age: 81
End: 2021-10-01
Payer: MEDICARE

## 2021-10-01 VITALS
HEART RATE: 68 BPM | BODY MASS INDEX: 22.66 KG/M2 | HEIGHT: 65 IN | OXYGEN SATURATION: 100 % | DIASTOLIC BLOOD PRESSURE: 66 MMHG | TEMPERATURE: 94.1 F | SYSTOLIC BLOOD PRESSURE: 133 MMHG | WEIGHT: 136 LBS

## 2021-10-01 DIAGNOSIS — Z90.49 ACQUIRED ABSENCE OF OTHER SPECIFIED PARTS OF DIGESTIVE TRACT: ICD-10-CM

## 2021-10-01 PROCEDURE — 99213 OFFICE O/P EST LOW 20 MIN: CPT

## 2021-10-01 NOTE — HISTORY OF PRESENT ILLNESS
[de-identified] : 81F with pmhx of partial colonic resection 2/2 diverticulitis, dementia, HTN, DM, HLD, GERD, neuropathy presenting for follow-up post hospitalization last month. 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 Then underwent laparoscopic cholecystectomy, discharged home with abx. Pt accompanied by family/daughter. States she has been overall doing well, jaundice has resolved (dced w/ bili of 1.7). Does note however she at times will complain of epigastric pain specifically at one of her laparoscopic incision sites which is tender. Also notes new diarrhea since hospitalization, pt unable to quantify but states multiple loose BMs per day. Denies any other complaints, no n/v/c, melena, hematochezia, fever/chills, or other issues. \par \par PMHx: \par DM (diabetes mellitus)\par HTN (hypertension)\par Reflux\par HLD (hyperlipidemia)\par Neuropathy of lower extremity\par Knee pain, chronic, right\par \par Medications: See chart.\par Allergies: NKDA.\par Surgical Hx: \par S/P eye surgery, follow-up exam\par right eye\par S/P colon resection - 2/2 diverticulitis\par S/P appendectomy\par Total knee replacement status\par left\par S/P partial hysterectomy\par \par SH: Denies tobacco, etoh, or drug use.\par FH: Family history of diabetes mellitus (Sibling)

## 2021-10-01 NOTE — PHYSICAL EXAM
[General Appearance - Alert] : alert [General Appearance - In No Acute Distress] : in no acute distress [Sclera] : the sclera and conjunctiva were normal [Extraocular Movements] : extraocular movements were intact [Outer Ear] : the ears and nose were normal in appearance [Oropharynx] : the oropharynx was normal [Neck Appearance] : the appearance of the neck was normal [Jugular Venous Distention Increased] : there was no jugular-venous distention [Auscultation Breath Sounds / Voice Sounds] : lungs were clear to auscultation bilaterally [Heart Rate And Rhythm] : heart rate was normal and rhythm regular [Heart Sounds] : normal S1 and S2 [Heart Sounds Gallop] : no gallops [Murmurs] : no murmurs [Heart Sounds Pericardial Friction Rub] : no pericardial rub [Edema] : there was no peripheral edema [Bowel Sounds] : normal bowel sounds [Abdomen Soft] : soft [] : no hepato-splenomegaly [Abdomen Mass (___ Cm)] : no abdominal mass palpated [FreeTextEntry1] : Mildly tender at epigastric laparoscopic site. Appears to be healing well. No erythema, edema, induration, or warmth.  [Abnormal Walk] : normal gait [Skin Color & Pigmentation] : normal skin color and pigmentation [No Focal Deficits] : no focal deficits [Oriented To Time, Place, And Person] : oriented to person, place, and time

## 2021-10-01 NOTE — ASSESSMENT
[FreeTextEntry1] : 81F with pmhx of partial colonic resection 2/2 diverticulitis, dementia, HTN, DM, HLD, GERD, neuropathy presenting for follow-up post hospitalization last month. 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 Then underwent laparoscopic cholecystectomy, discharged home with abx. Doing overall ok since hospitalization but complains of new onset diarrhea since cholecystectomy and mild epigastric pain- abd exam pain reproducible, tender at epigastric laparoscopic incision site but appears to be healing well, no induration, warmth, or erythema. \par - Will obtain CT abdomen to evaluate biliary stent position, if still in place will need endoscopic removal, also evaluate for any other potential cause of abd pain, diarrhea.\par - Check stool studies, C. diff and culture in light of recent hospitalization, ddx includes abx related or post tiffanie related however will r/o infection. \par - Start cholestyramine packets BID empirically given diarrhea post tiffanie.\par - RTC 2-3 months.

## 2021-10-05 ENCOUNTER — LABORATORY RESULT (OUTPATIENT)
Age: 81
End: 2021-10-05

## 2021-10-05 ENCOUNTER — APPOINTMENT (OUTPATIENT)
Dept: SURGERY | Facility: CLINIC | Age: 81
End: 2021-10-05
Payer: MEDICARE

## 2021-10-05 VITALS
HEART RATE: 60 BPM | WEIGHT: 136 LBS | SYSTOLIC BLOOD PRESSURE: 153 MMHG | DIASTOLIC BLOOD PRESSURE: 79 MMHG | HEIGHT: 61 IN | OXYGEN SATURATION: 100 % | BODY MASS INDEX: 25.68 KG/M2

## 2021-10-05 VITALS — TEMPERATURE: 96.5 F

## 2021-10-05 DIAGNOSIS — Z87.891 PERSONAL HISTORY OF NICOTINE DEPENDENCE: ICD-10-CM

## 2021-10-05 DIAGNOSIS — Z78.9 OTHER SPECIFIED HEALTH STATUS: ICD-10-CM

## 2021-10-05 PROCEDURE — 99024 POSTOP FOLLOW-UP VISIT: CPT

## 2021-10-07 LAB
BACTERIA STL CULT: NORMAL
DEPRECATED O AND P PREP STL: NORMAL

## 2021-10-11 LAB
C DIFF TOXIN B QL PCR REFLEX: NORMAL
GDH ANTIGEN: DETECTED
TOXIN A AND B: NOT DETECTED

## 2021-10-15 ENCOUNTER — APPOINTMENT (OUTPATIENT)
Dept: CT IMAGING | Facility: HOSPITAL | Age: 81
End: 2021-10-15
Payer: MEDICARE

## 2021-10-15 ENCOUNTER — OUTPATIENT (OUTPATIENT)
Dept: OUTPATIENT SERVICES | Facility: HOSPITAL | Age: 81
LOS: 1 days | End: 2021-10-15
Payer: COMMERCIAL

## 2021-10-15 DIAGNOSIS — R19.7 DIARRHEA, UNSPECIFIED: ICD-10-CM

## 2021-10-15 DIAGNOSIS — R10.9 UNSPECIFIED ABDOMINAL PAIN: ICD-10-CM

## 2021-10-15 DIAGNOSIS — Z98.890 OTHER SPECIFIED POSTPROCEDURAL STATES: ICD-10-CM

## 2021-10-15 PROCEDURE — 74176 CT ABD & PELVIS W/O CONTRAST: CPT

## 2021-10-15 PROCEDURE — 74176 CT ABD & PELVIS W/O CONTRAST: CPT | Mod: 26

## 2021-11-04 ENCOUNTER — OUTPATIENT (OUTPATIENT)
Dept: OUTPATIENT SERVICES | Facility: HOSPITAL | Age: 81
LOS: 1 days | End: 2021-11-04
Payer: COMMERCIAL

## 2021-11-04 ENCOUNTER — RESULT REVIEW (OUTPATIENT)
Age: 81
End: 2021-11-04

## 2021-11-04 VITALS
SYSTOLIC BLOOD PRESSURE: 133 MMHG | WEIGHT: 136.91 LBS | OXYGEN SATURATION: 99 % | RESPIRATION RATE: 16 BRPM | DIASTOLIC BLOOD PRESSURE: 52 MMHG | HEIGHT: 65 IN | HEART RATE: 75 BPM | TEMPERATURE: 98 F

## 2021-11-04 DIAGNOSIS — Z01.818 ENCOUNTER FOR OTHER PREPROCEDURAL EXAMINATION: ICD-10-CM

## 2021-11-04 DIAGNOSIS — E11.9 TYPE 2 DIABETES MELLITUS WITHOUT COMPLICATIONS: ICD-10-CM

## 2021-11-04 DIAGNOSIS — I10 ESSENTIAL (PRIMARY) HYPERTENSION: ICD-10-CM

## 2021-11-04 DIAGNOSIS — Z98.890 OTHER SPECIFIED POSTPROCEDURAL STATES: ICD-10-CM

## 2021-11-04 DIAGNOSIS — G47.33 OBSTRUCTIVE SLEEP APNEA (ADULT) (PEDIATRIC): ICD-10-CM

## 2021-11-04 LAB
A1C WITH ESTIMATED AVERAGE GLUCOSE RESULT: 8.3 % — HIGH (ref 4–5.6)
ALBUMIN SERPL ELPH-MCNC: 3.2 G/DL — LOW (ref 3.5–5)
ALP SERPL-CCNC: 118 U/L — SIGNIFICANT CHANGE UP (ref 40–120)
ALT FLD-CCNC: 22 U/L DA — SIGNIFICANT CHANGE UP (ref 10–60)
AMYLASE P1 CFR SERPL: 92 U/L — SIGNIFICANT CHANGE UP (ref 25–115)
ANION GAP SERPL CALC-SCNC: 7 MMOL/L — SIGNIFICANT CHANGE UP (ref 5–17)
APTT BLD: 32.1 SEC — SIGNIFICANT CHANGE UP (ref 27.5–35.5)
AST SERPL-CCNC: 18 U/L — SIGNIFICANT CHANGE UP (ref 10–40)
BILIRUB DIRECT SERPL-MCNC: 0.2 MG/DL — SIGNIFICANT CHANGE UP (ref 0–0.2)
BILIRUB SERPL-MCNC: 0.5 MG/DL — SIGNIFICANT CHANGE UP (ref 0.2–1.2)
BUN SERPL-MCNC: 25 MG/DL — HIGH (ref 7–18)
CALCIUM SERPL-MCNC: 8.7 MG/DL — SIGNIFICANT CHANGE UP (ref 8.4–10.5)
CHLORIDE SERPL-SCNC: 101 MMOL/L — SIGNIFICANT CHANGE UP (ref 96–108)
CO2 SERPL-SCNC: 28 MMOL/L — SIGNIFICANT CHANGE UP (ref 22–31)
CREAT SERPL-MCNC: 1.11 MG/DL — SIGNIFICANT CHANGE UP (ref 0.5–1.3)
ESTIMATED AVERAGE GLUCOSE: 192 MG/DL — HIGH (ref 68–114)
GLUCOSE SERPL-MCNC: 294 MG/DL — HIGH (ref 70–99)
HCT VFR BLD CALC: 36.8 % — SIGNIFICANT CHANGE UP (ref 34.5–45)
HGB BLD-MCNC: 11.9 G/DL — SIGNIFICANT CHANGE UP (ref 11.5–15.5)
INR BLD: 1.04 RATIO — SIGNIFICANT CHANGE UP (ref 0.88–1.16)
LIDOCAIN IGE QN: 461 U/L — HIGH (ref 73–393)
MCHC RBC-ENTMCNC: 30.3 PG — SIGNIFICANT CHANGE UP (ref 27–34)
MCHC RBC-ENTMCNC: 32.3 GM/DL — SIGNIFICANT CHANGE UP (ref 32–36)
MCV RBC AUTO: 93.6 FL — SIGNIFICANT CHANGE UP (ref 80–100)
NRBC # BLD: 0 /100 WBCS — SIGNIFICANT CHANGE UP (ref 0–0)
PLATELET # BLD AUTO: 231 K/UL — SIGNIFICANT CHANGE UP (ref 150–400)
POTASSIUM SERPL-MCNC: 4.1 MMOL/L — SIGNIFICANT CHANGE UP (ref 3.5–5.3)
POTASSIUM SERPL-SCNC: 4.1 MMOL/L — SIGNIFICANT CHANGE UP (ref 3.5–5.3)
PROT SERPL-MCNC: 7.5 G/DL — SIGNIFICANT CHANGE UP (ref 6–8.3)
PROTHROM AB SERPL-ACNC: 12.4 SEC — SIGNIFICANT CHANGE UP (ref 10.6–13.6)
RBC # BLD: 3.93 M/UL — SIGNIFICANT CHANGE UP (ref 3.8–5.2)
RBC # FLD: 13.7 % — SIGNIFICANT CHANGE UP (ref 10.3–14.5)
SODIUM SERPL-SCNC: 136 MMOL/L — SIGNIFICANT CHANGE UP (ref 135–145)
WBC # BLD: 6.87 K/UL — SIGNIFICANT CHANGE UP (ref 3.8–10.5)
WBC # FLD AUTO: 6.87 K/UL — SIGNIFICANT CHANGE UP (ref 3.8–10.5)

## 2021-11-04 PROCEDURE — G0463: CPT

## 2021-11-04 PROCEDURE — 71046 X-RAY EXAM CHEST 2 VIEWS: CPT | Mod: 26

## 2021-11-04 PROCEDURE — 71046 X-RAY EXAM CHEST 2 VIEWS: CPT

## 2021-11-04 NOTE — H&P PST ADULT - ASSESSMENT
82 y/o female with PMH HTN, DM, Diverticulitis (needing exlap in the 1990s), right DVT (2000), COVID 19 viral infection 4/2020, now with post-COVID, short-term memory loss and dementia (9/2020) is diagnosed with other specified postprocedural status

## 2021-11-04 NOTE — CONSULT NOTE ADULT - ASSESSMENT
80 y/o female with PMH HTN, DM, Diverticulitis (needing exlap in the 1990s), right DVT (2000), COVID 19 viral infection 4/2020, now with post-COVID, short-term memory loss and dementia (9/2020) is diagnosed with other specified postprocedural status      Problem/Plan - 1:   ·  Plan: Scheduled for endoscopic retrograde cholangiopancreatography (ERCP) 11/9/2021  Preoperative instructions discussed and given to patient and family verbally and in writing   NPO after midnight the day before surgery until after surgery   Patient's Revised Cardiac Risk Index (RCRI) score is0  ( 3.9 % risk). Patient is at low risk of  adverse perioperative cardiac events undergoinglow  risk surgery. No further cardiac testing is needed prior to patient's surgery.     Problem/Plan - 2:  ·  Problem: Type 2 diabetes mellitus.   ·  Plan: Patient is to hold oral Janumet and Glimepiride the morning of surgery       Problem/Plan - 3:  ·  Problem: Hypertension.   ·  Plan: Patient is to take losartan/HCTZ and nifedipine the morning of surgery with a sip of water.   Follow up with PCP postoperatively for management of hypertension.    Problem/Plan - 4:  ·  Problem: NICOLETTE (obstructive sleep apnea).   ·  Plan: Patient never being diagnosed with NICOLETTE   No sleep studies done   STOP BANG  Score 2    Maintain perioperative and postoperative precautions.

## 2021-11-04 NOTE — H&P PST ADULT - PROBLEM SELECTOR PLAN 4
Patient never being diagnosed with NICOLETTE   No sleep studies done   STOP BANG  Score 2    Maintain perioperative and postoperative precautions

## 2021-11-04 NOTE — CONSULT NOTE ADULT - SUBJECTIVE AND OBJECTIVE BOX
DATE OF SERVICE:  11/04/2021  Patient was seen,examined and evaluated  by me.ER evaluation, Labs and Hospital course was reviewed,    CHIEF COMPLAINT:ERCP    HPI:HPI:  This is an 82 y/o female from home accompanied by daughter Delfina [phone]), with PMH HTN, DM, Diverticulitis (needing exlap in the 1990s), right DVT (2000), COVID 19 viral infection 4/2020, now with post-COVID, short-term memory loss and dementia dx 9/2020, obstructive jaundice with RUQ pain s/p EGD/EUS (9/9/2021. Her current diagnosis is other specified postprocedural status. Patient is scheduled for endoscopic retrograde cholangiopancreatography (ERCP) 11/9/2021   Denies chest pain dyspnea has good effort tolerance No prior cardiac events.      PAST MEDICAL & SURGICAL HISTORY:  DM (diabetes mellitus)    HTN (hypertension)    Reflux    HLD (hyperlipidemia)    Neuropathy of lower extremity    RA (rheumatoid arthritis)    Deep vein thrombosis (DVT), right    Knee pain, chronic, right    S/P eye surgery, follow-up exam  right eye    S/P colon resection    S/P appendectomy    Total knee replacement status  left 2004, right 2014    S/P partial hysterectomy        MEDICATIONS  (STANDING):  Reviewed    FAMILY HISTORY:  Family history of diabetes mellitus (Sibling)  Type 2      No family history of premature coronary artery disease or sudden cardiac death    SOCIAL HISTORY:  Smoking-[ ] Active  [ ] Former [ ] Non Smoker  Alcohol-[ ] Denies [ ] Social [ ] Daily  Ilicit Drug use-[ ] Denies [ ] Active user    REVIEW OF SYSTEMS:  Constitutional: [ ] fever, [ ]weight loss, [ ]fatigue   Activity [ ] Bedbound,[ ] Ambulates [ ] Unassisted[ ] Cane/Walker [ ] Assistence.  Effort tolerance:[ ] Excellent [ ] Good [ ] Fair [ ] Poor [ ]  Eyes: [ ] visual changes  Respiratory: [ ]shortness of breath;  [ ] cough, [ ]wheezing, [ ]chills, [ ]hemoptysis  Cardiovascular: [ ] chest pain, [ ]palpitations, [ ]dizziness,  [ ]leg swelling[ ]orthopnea [ ]PND  Gastrointestinal: [ ] abdominal pain, [ ]nausea, [ ]vomiting,  [ ]diarrhea,[ ]constipation  Genitourinary: [ ] dysuria, [ ] hematuria  Neurologic: [ ] headaches [ ] tremors[ ] weakness  Skin: [ ] itching, [ ]burning, [ ] rashes  Endocrine: [ ] heat or cold intolerance  Musculoskeletal: [ ] joint pain or swelling; [ ] muscle, back, or extremity pain  Psychiatric: [ ] depression, [ ]anxiety, [ ]mood swings, or [ ]difficulty sleeping  Hematologic: [ ] easy bruising, [ ] bleeding gums       [ x] All others negative	  [ ] Unable to obtain  x      PHYSICAL EXAM:  General: No acute distress BMI-  HEENT: EOMI, PERRL[ ] Icteric  Neck: Supple, No JVD  Lungs: Equal air entry bilaterally; [ ] Rales [ ] Rhonchi [ ] Wheezing  Heart: Regular rate and rhythm;[xx2 ] Murmurs-   2/6 [x ] Systolic [ ] Diastolic [ ] Radiation,No rubs, or gallops  Abdomen: Nontender, bowel sounds present  Extremities: No clubbing, cyanosis, or edema[ ] Calf tenderness  Nervous system:  Alert & Oriented X3, no focal deficits  Psychiatric: Normal affect  Skin: No rashes or lesions      LABS:        Creatinine Trend: 1.11<--            ECG [my interpretation]:Sinus rhythm no acute ST T abnormalities

## 2021-11-04 NOTE — H&P PST ADULT - HISTORY OF PRESENT ILLNESS
80 y/o female from home accompanied by daughter Delfina [phone]), with PMH HTN, DM, Diverticulitis (needing exlap in the 1990s), right DVT (2000), COVID 19 viral infection 4/2020, now with post-COVID, short-term memory loss and dementia dx 9/2020, is diagnosed with    This is an 82 y/o female from home accompanied by daughter Delfina [phone]), with PMH HTN, DM, Diverticulitis (needing exlap in the 1990s), right DVT (2000), COVID 19 viral infection 4/2020, now with post-COVID, short-term memory loss and dementia dx 9/2020, obstructive jaundice with RUQ pain s/p EGD/EUS (9/9/2021. Her current diagnosis is other specified postprocedural status. Patient is scheduled for endoscopic retrograde cholangiopancreatography (ERCP) 11/9/2021

## 2021-11-04 NOTE — H&P PST ADULT - PROBLEM SELECTOR PLAN 1
Scheduled for endoscopic retrograde cholangiopancreatography (ERCP) 11/9/2021  Preoperative instructions discussed and given to patient and family verbally and in writing   NPO after midnight the day before surgery until after surgery   Instructed to stop aspirin and aspirin products, over the counter medications including vitamins and herbal medications one week prior to surgery unless otherwise instructed by doctor. Tylenol may be taken as needed for pain   Discussed preprocedure skin preparation using dial soap the morning of surgery   Instructed to call [phone] to scheduled COVID 19 test 3 days prior to surgery   Patient's daughter, Delfina verbalized understanding of instruction and agrees with plan of care.  (Patient has short term memory loss, early dementia)  Patient will be optimized for surgery by Dr. Rosenberg (Cardiologist) Scheduled for endoscopic retrograde cholangiopancreatography (ERCP) 11/9/2021  Preoperative instructions discussed and given to patient and family verbally and in writing   NPO after midnight the day before surgery until after surgery   Instructed to stop aspirin and aspirin products, over the counter medications including vitamins and herbal medications one week prior to surgery unless otherwise instructed by doctor. Tylenol may be taken as needed for pain   Discussed preprocedure skin preparation using dial soap the morning of surgery   Instructed to call [phone] to scheduled COVID 19 test 3 days prior to surgery   Patient's daughter, Delfina verbalized understanding of instruction and agrees with plan of care.  (Patient has short term memory loss, early dementia - HIGH FALL RISK)  Patient will be optimized for surgery by Dr. Rosenberg (Cardiologist) Scheduled for endoscopic retrograde cholangiopancreatography (ERCP) 11/9/2021  Preoperative instructions discussed and given to patient and family verbally and in writing   NPO after midnight the day before surgery until after surgery   Instructed to stop aspirin and aspirin products, over the counter medications including vitamins and herbal medications one week prior to surgery unless otherwise instructed by doctor. Tylenol may be taken as needed for pain   Discussed preprocedure skin preparation using dial soap the morning of surgery   Instructed to call [phone] to scheduled COVID 19 test 3 days prior to surgery   Patient's daughter, Delfina verbalized understanding of instruction and agrees with plan of care.  (Patient has short term memory loss, early dementia - HIGH FALL RISK)    Patient will be optimized for surgery by Dr. Rosenberg (Cardiologist)  Addendum: Dr. Rosenberg has optimized patient for surgery 119/2021 - see paper chart

## 2021-11-04 NOTE — H&P PST ADULT - NSANTHOSAYNRD_GEN_A_CORE
No. NICOLETTE screening performed.  STOP BANG Legend: 0-2 = LOW Risk; 3-4 = INTERMEDIATE Risk; 5-8 = HIGH Risk

## 2021-11-04 NOTE — H&P PST ADULT - NSICDXPASTSURGICALHX_GEN_ALL_CORE_FT
PAST SURGICAL HISTORY:  S/P appendectomy     S/P colon resection     S/P eye surgery, follow-up exam right eye    S/P partial hysterectomy     Total knee replacement status left     PAST SURGICAL HISTORY:  S/P appendectomy     S/P colon resection     S/P eye surgery, follow-up exam right eye    S/P partial hysterectomy     Total knee replacement status left 2004, right 2014

## 2021-11-04 NOTE — H&P PST ADULT - PROBLEM SELECTOR PLAN 3
Patient is to take losartan/HCTZ and nifedipine the morning of surgery with a sip of water.   Follow up with PCP postoperatively for management of hypertension.

## 2021-11-04 NOTE — H&P PST ADULT - PROBLEM SELECTOR PLAN 2
Patient is to hold oral Janumet and Glimepiride the morning of surgery   Hemoglobin A1C today  Follow up with PCP postoperatively for management of diabetes Patient is to hold oral Janumet and Glimepiride the morning of surgery   Hemoglobin A1C today  Follow up with PCP postoperatively for management of diabetes  Addendum  11/8/2021: Result: A1C 8.3% Patient is reportedly compliant with diabetes medications  Patient is to hold oral Janumet and Glimepiride the morning of surgery   Hemoglobin A1C today  Follow up with PCP postoperatively for management of diabetes    Addendum  11/8/2021: Result: A1C 8.3%  Patient was asymptomatic during PST encounter  Dr. Clemens (surgeon) was made aware of elevated A1C results  Dr. Michelle was made aware of elevated A1C results  Will order POC glucose stat for the morning of surgery

## 2021-11-06 ENCOUNTER — APPOINTMENT (OUTPATIENT)
Dept: DISASTER EMERGENCY | Facility: CLINIC | Age: 81
End: 2021-11-06

## 2021-11-06 DIAGNOSIS — Z01.818 ENCOUNTER FOR OTHER PREPROCEDURAL EXAMINATION: ICD-10-CM

## 2021-11-07 LAB — SARS-COV-2 N GENE NPH QL NAA+PROBE: NOT DETECTED

## 2021-11-09 ENCOUNTER — APPOINTMENT (OUTPATIENT)
Dept: NEUROLOGY | Facility: CLINIC | Age: 81
End: 2021-11-09

## 2021-11-09 ENCOUNTER — APPOINTMENT (OUTPATIENT)
Dept: GASTROENTEROLOGY | Facility: HOSPITAL | Age: 81
End: 2021-11-09

## 2021-11-09 ENCOUNTER — OUTPATIENT (OUTPATIENT)
Dept: OUTPATIENT SERVICES | Facility: HOSPITAL | Age: 81
LOS: 1 days | End: 2021-11-09
Payer: COMMERCIAL

## 2021-11-09 DIAGNOSIS — Z98.890 OTHER SPECIFIED POSTPROCEDURAL STATES: ICD-10-CM

## 2021-11-09 LAB — GLUCOSE BLDC GLUCOMTR-MCNC: 211 MG/DL — HIGH (ref 70–99)

## 2021-11-09 PROCEDURE — 82962 GLUCOSE BLOOD TEST: CPT

## 2021-11-09 PROCEDURE — 76000 FLUOROSCOPY <1 HR PHYS/QHP: CPT

## 2021-11-09 PROCEDURE — 43264 ERCP REMOVE DUCT CALCULI: CPT

## 2021-11-09 PROCEDURE — C1889: CPT

## 2021-11-09 PROCEDURE — 43275 ERCP REMOVE FORGN BODY DUCT: CPT

## 2021-11-09 PROCEDURE — C1769: CPT

## 2021-11-12 NOTE — ASSESSMENT
[FreeTextEntry1] : SD YANEZ is a 81 year old female who underwent a Laparoscopic cholecystectomy on 09/15/2021\par \par \par Patient is doing well, with expected post-operative recovery. All surgical incisions are healing well and as expected. There is no evidence of infection or complication, and patient is progressing as expected. Post-operative wound care, activity, restrictions and precautions reinforced. path discussed. Patient was instructed no heavy lifting  4-6 weeks. Patient's questions and concerns addressed to patient's satisfaction. \par  \par Follow with GI for biliary stent

## 2021-11-12 NOTE — HISTORY OF PRESENT ILLNESS
[de-identified] : SD YANEZ is a 81 year old female who presents in the office for postop visit. Patient was admitted to [facility] for  choledocholithiasis. She underwent a Laparoscopic cholecystectomy on 09/15/2021 pathology results are consistent with Chronic cholecystitis, marked with lymphoid follicles Hyperplastic pericholecystic lymph node Today patient is doing well, offers no complaints. Denies any fevers, chills, nausea, vomiting, diarrhea or constipation. Patient able to tolerate regular diet with normal bowel movements. Surgical incisions are healing well. No sign of inflammation or exudate.

## 2021-11-12 NOTE — PHYSICAL EXAM
[Alert] : alert [Oriented to Person] : oriented to person [Oriented to Place] : oriented to place [Oriented to Time] : oriented to time [Calm] : calm [de-identified] : The patient is alert, well-groomed  [de-identified] : Breath sounds equal and bilateral, no wheezing no rales or rhonchi  [de-identified] :  good S1, S2, no m/r/g bilateral  [de-identified] : Normoactive bowel sounds, soft and nontender, no hepatosplenomegaly or masses noted, Incision sites are healing well  [de-identified] : WNL

## 2021-12-06 ENCOUNTER — APPOINTMENT (OUTPATIENT)
Dept: NEUROLOGY | Facility: CLINIC | Age: 81
End: 2021-12-06
Payer: MEDICARE

## 2021-12-06 ENCOUNTER — APPOINTMENT (OUTPATIENT)
Dept: GASTROENTEROLOGY | Facility: CLINIC | Age: 81
End: 2021-12-06
Payer: MEDICARE

## 2021-12-06 VITALS
TEMPERATURE: 96.6 F | OXYGEN SATURATION: 95 % | SYSTOLIC BLOOD PRESSURE: 136 MMHG | HEIGHT: 61 IN | DIASTOLIC BLOOD PRESSURE: 62 MMHG | BODY MASS INDEX: 25.86 KG/M2 | WEIGHT: 137 LBS | HEART RATE: 59 BPM

## 2021-12-06 DIAGNOSIS — Z98.890 OTHER SPECIFIED POSTPROCEDURAL STATES: ICD-10-CM

## 2021-12-06 PROCEDURE — 99213 OFFICE O/P EST LOW 20 MIN: CPT

## 2021-12-06 PROCEDURE — 99215 OFFICE O/P EST HI 40 MIN: CPT

## 2021-12-06 NOTE — HISTORY OF PRESENT ILLNESS
[FreeTextEntry1] : Fell when visiting her sister in [place]. Attributed to quetiapine but she was given two tablets and was allowed to walk.  When in [place] she has not suffered a fall despite using the medications. Also gabapentin has not changed her irritability.

## 2021-12-06 NOTE — HISTORY OF PRESENT ILLNESS
[de-identified] : 81F with pmhx of presumed choledochocele s/p biliary sphincterotomy and stent, now removed, cholecystectomy, C. diff, partial colonic resection 2/2 diverticulitis, dementia, HTN, DM, HLD, GERD, neuropathy presenting for follow-up. Pt reports feeling well today. Denies any GI complaints, no abd pain, n/v/d/c, melena, hematochezia, fever/chills, or other issues. Last visit, had diarrhea which has now resolved after PO vanco course for C. diff positive, no longer taking cholestyramine. Had ERCP with me in interim as well on 11/9 where I removed her stent and cleared her duct.

## 2021-12-06 NOTE — ASSESSMENT
[FreeTextEntry1] : 81F with pmhx of presumed choledochocele s/p biliary sphincterotomy and stent, now removed, cholecystectomy, C. diff, partial colonic resection 2/2 diverticulitis, dementia, HTN, DM, HLD, GERD, neuropathy presenting for follow-up. Last seen in office 10/2021 had acute diarrhea, I tested for C. diff which was pos and treated with PO vanco, diarrhea now resolved. Also in interim, had ERCP 11/9 and removed her biliary stent. Doing well today from a GI perspective, no GI complaints. \par 1. Diarrhea: Now resolved, likely multifactorial 2/2 c. diff and also post tiffanie diarrhea given some improvement with cholestyramine. Now however resolved, off cholestyramine. \par 2. Choledochocele: Resolved now s/p biliary sphincterotomy. Stent removed.\par 3. Hx of choledocholithiasis: Resolved now post ERCP. \par \par F/u with me as needed.

## 2021-12-06 NOTE — DISCUSSION/SUMMARY
[FreeTextEntry1] : She has suffered a significant decline in her mental status.  18 months ago her [place] cognitive assessment score was 21/30.  Today it is 11/30.  She no longer complains of headache.  Of note the sedimentation rate that had been as high as 89, last year has come down to near normal (29 mm/hr).  Previous paraneoplastic autoantibody evaluation was normal.  Because of the history of inflammatory state and rapidly progressive dementia, possibility of autoimmune encephalitis will be considered and autoimmune encephalitis panel will be tested.  It remains possible that she could have sporadic CJD.  Diagnostic tests for prion disease,  however, will not be performed because there is no treatment available.\par Reviewed the MRI scan of the head from June 2020, demonstrating widespread volume loss with moderate to mild white matter ischemic changes.  Recommend continue current medications for memory (donepezil and memantine).  Discontinue quetiapine because of for aphasia dyskinesias have worsened.  Increase gabapentin to manage sundowning.  She needs additional help in the evening when her daughters are working, for preparing and eating dinner clearing up, toilet hygiene and preparing for bed at night.

## 2021-12-06 NOTE — PHYSICAL EXAM
[Person] : oriented to person [Concentration Intact] : normal concentrating ability [Visual Intact] : visual attention was ~T not ~L decreased [Fluency] : fluency intact [Comprehension] : comprehension intact [Reading] : reading intact [Cranial Nerves Optic (II)] : visual acuity intact bilaterally,  visual fields full to confrontation, pupils equal round and reactive to light [Cranial Nerves Oculomotor (III)] : extraocular motion intact [Cranial Nerves Trigeminal (V)] : facial sensation intact symmetrically [Cranial Nerves Facial (VII)] : face symmetrical [Cranial Nerves Vestibulocochlear (VIII)] : hearing was intact bilaterally [Cranial Nerves Glossopharyngeal (IX)] : tongue and palate midline [Cranial Nerves Accessory (XI - Cranial And Spinal)] : head turning and shoulder shrug symmetric [Cranial Nerves Hypoglossal (XII)] : there was no tongue deviation with protrusion [Sensation Tactile Decrease] : light touch was intact [Sensation Pain / Temperature Decrease] : pain and temperature was intact [Limited Balance] : the patient's balance was impaired [2+] : Ankle jerk left 2+ [FreeTextEntry1] : Cannot remember dates; orofacial dyskinesia; noted for many years and worsen now.  [Place] : disoriented to place [Time] : disoriented to time [Short Term Intact] : short term memory impaired [Registration Intact] : recent registration memory impaired [Naming Objects] : difficulty naming common objects [Repeating Phrases] : difficulty repeating a phrase [Writing A Sentence] : difficulty writing a sentence [Motor Strength Upper Extremities Bilaterally] : strength was normal in both upper extremities [Motor Strength Lower Extremities Bilaterally] : strength was normal in both lower extremities [Dysdiadochokinesia Bilaterally] : not present [Coordination - Dysmetria Impaired Finger-to-Nose Bilateral] : not present [Plantar Reflex Right Only] : normal on the right [Plantar Reflex Left Only] : normal on the left

## 2021-12-06 NOTE — PHYSICAL EXAM
[General Appearance - Alert] : alert [General Appearance - In No Acute Distress] : in no acute distress [Sclera] : the sclera and conjunctiva were normal [Extraocular Movements] : extraocular movements were intact [Outer Ear] : the ears and nose were normal in appearance [Oropharynx] : the oropharynx was normal [Neck Appearance] : the appearance of the neck was normal [Jugular Venous Distention Increased] : there was no jugular-venous distention [Auscultation Breath Sounds / Voice Sounds] : lungs were clear to auscultation bilaterally [Heart Rate And Rhythm] : heart rate was normal and rhythm regular [Heart Sounds] : normal S1 and S2 [Heart Sounds Gallop] : no gallops [Murmurs] : no murmurs [Heart Sounds Pericardial Friction Rub] : no pericardial rub [Edema] : there was no peripheral edema [Bowel Sounds] : normal bowel sounds [Abdomen Soft] : soft [] : no hepato-splenomegaly [Abdomen Mass (___ Cm)] : no abdominal mass palpated [Abnormal Walk] : normal gait [Skin Color & Pigmentation] : normal skin color and pigmentation [No Focal Deficits] : no focal deficits [FreeTextEntry1] : AAox2

## 2022-01-11 ENCOUNTER — TRANSCRIPTION ENCOUNTER (OUTPATIENT)
Age: 82
End: 2022-01-11

## 2022-06-02 ENCOUNTER — INPATIENT (INPATIENT)
Facility: HOSPITAL | Age: 82
LOS: 1 days | Discharge: HOME | DRG: 305 | End: 2022-06-04
Attending: INTERNAL MEDICINE | Admitting: INTERNAL MEDICINE
Payer: COMMERCIAL

## 2022-06-02 VITALS
HEIGHT: 65 IN | RESPIRATION RATE: 16 BRPM | TEMPERATURE: 98 F | SYSTOLIC BLOOD PRESSURE: 165 MMHG | HEART RATE: 48 BPM | DIASTOLIC BLOOD PRESSURE: 76 MMHG | OXYGEN SATURATION: 98 % | WEIGHT: 143.08 LBS

## 2022-06-02 DIAGNOSIS — I16.0 HYPERTENSIVE URGENCY: ICD-10-CM

## 2022-06-02 DIAGNOSIS — R00.1 BRADYCARDIA, UNSPECIFIED: ICD-10-CM

## 2022-06-02 DIAGNOSIS — Z29.9 ENCOUNTER FOR PROPHYLACTIC MEASURES, UNSPECIFIED: ICD-10-CM
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
## 2022-06-02 DIAGNOSIS — E11.9 TYPE 2 DIABETES MELLITUS WITHOUT COMPLICATIONS: ICD-10-CM

## 2022-06-02 LAB
ALBUMIN SERPL ELPH-MCNC: 3.3 G/DL — LOW (ref 3.5–5)
ALP SERPL-CCNC: 87 U/L — SIGNIFICANT CHANGE UP (ref 40–120)
ALT FLD-CCNC: 29 U/L DA — SIGNIFICANT CHANGE UP (ref 10–60)
ANION GAP SERPL CALC-SCNC: 7 MMOL/L — SIGNIFICANT CHANGE UP (ref 5–17)
AST SERPL-CCNC: 26 U/L — SIGNIFICANT CHANGE UP (ref 10–40)
BASOPHILS # BLD AUTO: 0.08 K/UL — SIGNIFICANT CHANGE UP (ref 0–0.2)
BASOPHILS NFR BLD AUTO: 0.9 % — SIGNIFICANT CHANGE UP (ref 0–2)
BILIRUB SERPL-MCNC: 0.6 MG/DL — SIGNIFICANT CHANGE UP (ref 0.2–1.2)
BUN SERPL-MCNC: 21 MG/DL — HIGH (ref 7–18)
CALCIUM SERPL-MCNC: 8.6 MG/DL — SIGNIFICANT CHANGE UP (ref 8.4–10.5)
CHLORIDE SERPL-SCNC: 102 MMOL/L — SIGNIFICANT CHANGE UP (ref 96–108)
CK SERPL-CCNC: 85 U/L — SIGNIFICANT CHANGE UP (ref 21–215)
CO2 SERPL-SCNC: 27 MMOL/L — SIGNIFICANT CHANGE UP (ref 22–31)
CREAT SERPL-MCNC: 1.23 MG/DL — SIGNIFICANT CHANGE UP (ref 0.5–1.3)
EGFR: 44 ML/MIN/1.73M2 — LOW
EOSINOPHIL # BLD AUTO: 0.42 K/UL — SIGNIFICANT CHANGE UP (ref 0–0.5)
EOSINOPHIL NFR BLD AUTO: 4.7 % — SIGNIFICANT CHANGE UP (ref 0–6)
GLUCOSE BLDC GLUCOMTR-MCNC: 236 MG/DL — HIGH (ref 70–99)
GLUCOSE BLDC GLUCOMTR-MCNC: 256 MG/DL — HIGH (ref 70–99)
GLUCOSE SERPL-MCNC: 315 MG/DL — HIGH (ref 70–99)
HCT VFR BLD CALC: 36.9 % — SIGNIFICANT CHANGE UP (ref 34.5–45)
HGB BLD-MCNC: 12.4 G/DL — SIGNIFICANT CHANGE UP (ref 11.5–15.5)
IMM GRANULOCYTES NFR BLD AUTO: 0.3 % — SIGNIFICANT CHANGE UP (ref 0–1.5)
LYMPHOCYTES # BLD AUTO: 2.36 K/UL — SIGNIFICANT CHANGE UP (ref 1–3.3)
LYMPHOCYTES # BLD AUTO: 26.5 % — SIGNIFICANT CHANGE UP (ref 13–44)
MAGNESIUM SERPL-MCNC: 1.8 MG/DL — SIGNIFICANT CHANGE UP (ref 1.6–2.6)
MCHC RBC-ENTMCNC: 30.5 PG — SIGNIFICANT CHANGE UP (ref 27–34)
MCHC RBC-ENTMCNC: 33.6 GM/DL — SIGNIFICANT CHANGE UP (ref 32–36)
MCV RBC AUTO: 90.9 FL — SIGNIFICANT CHANGE UP (ref 80–100)
MONOCYTES # BLD AUTO: 0.54 K/UL — SIGNIFICANT CHANGE UP (ref 0–0.9)
MONOCYTES NFR BLD AUTO: 6.1 % — SIGNIFICANT CHANGE UP (ref 2–14)
NEUTROPHILS # BLD AUTO: 5.47 K/UL — SIGNIFICANT CHANGE UP (ref 1.8–7.4)
NEUTROPHILS NFR BLD AUTO: 61.5 % — SIGNIFICANT CHANGE UP (ref 43–77)
NRBC # BLD: 0 /100 WBCS — SIGNIFICANT CHANGE UP (ref 0–0)
PLATELET # BLD AUTO: 192 K/UL — SIGNIFICANT CHANGE UP (ref 150–400)
POTASSIUM SERPL-MCNC: 3.7 MMOL/L — SIGNIFICANT CHANGE UP (ref 3.5–5.3)
POTASSIUM SERPL-SCNC: 3.7 MMOL/L — SIGNIFICANT CHANGE UP (ref 3.5–5.3)
PROT SERPL-MCNC: 7.5 G/DL — SIGNIFICANT CHANGE UP (ref 6–8.3)
RBC # BLD: 4.06 M/UL — SIGNIFICANT CHANGE UP (ref 3.8–5.2)
RBC # FLD: 14.3 % — SIGNIFICANT CHANGE UP (ref 10.3–14.5)
SARS-COV-2 RNA SPEC QL NAA+PROBE: SIGNIFICANT CHANGE UP
SODIUM SERPL-SCNC: 136 MMOL/L — SIGNIFICANT CHANGE UP (ref 135–145)
TROPONIN I, HIGH SENSITIVITY RESULT: 12.3 NG/L — SIGNIFICANT CHANGE UP
WBC # BLD: 8.9 K/UL — SIGNIFICANT CHANGE UP (ref 3.8–10.5)
WBC # FLD AUTO: 8.9 K/UL — SIGNIFICANT CHANGE UP (ref 3.8–10.5)

## 2022-06-02 PROCEDURE — 72125 CT NECK SPINE W/O DYE: CPT | Mod: 26,MA

## 2022-06-02 PROCEDURE — 71250 CT THORAX DX C-: CPT | Mod: 26,MA

## 2022-06-02 PROCEDURE — 70450 CT HEAD/BRAIN W/O DYE: CPT | Mod: 26,MA

## 2022-06-02 PROCEDURE — 73110 X-RAY EXAM OF WRIST: CPT | Mod: 26,RT

## 2022-06-02 PROCEDURE — 99285 EMERGENCY DEPT VISIT HI MDM: CPT

## 2022-06-02 PROCEDURE — 73562 X-RAY EXAM OF KNEE 3: CPT | Mod: 26,RT

## 2022-06-02 PROCEDURE — 99223 1ST HOSP IP/OBS HIGH 75: CPT | Mod: GC

## 2022-06-02 RX ORDER — MORPHINE SULFATE 50 MG/1
4 CAPSULE, EXTENDED RELEASE ORAL ONCE
Refills: 0 | Status: DISCONTINUED | OUTPATIENT
Start: 2022-06-02 | End: 2022-06-02

## 2022-06-02 RX ORDER — MEMANTINE HYDROCHLORIDE 10 MG/1
5 TABLET ORAL DAILY
Refills: 0 | Status: DISCONTINUED | OUTPATIENT
Start: 2022-06-02 | End: 2022-06-04

## 2022-06-02 RX ORDER — ONDANSETRON 8 MG/1
4 TABLET, FILM COATED ORAL EVERY 8 HOURS
Refills: 0 | Status: DISCONTINUED | OUTPATIENT
Start: 2022-06-02 | End: 2022-06-04

## 2022-06-02 RX ORDER — GABAPENTIN 400 MG/1
100 CAPSULE ORAL THREE TIMES A DAY
Refills: 0 | Status: DISCONTINUED | OUTPATIENT
Start: 2022-06-02 | End: 2022-06-04

## 2022-06-02 RX ORDER — INSULIN LISPRO 100/ML
VIAL (ML) SUBCUTANEOUS
Refills: 0 | Status: DISCONTINUED | OUTPATIENT
Start: 2022-06-02 | End: 2022-06-04

## 2022-06-02 RX ORDER — FAMOTIDINE 10 MG/ML
20 INJECTION INTRAVENOUS ONCE
Refills: 0 | Status: COMPLETED | OUTPATIENT
Start: 2022-06-02 | End: 2022-06-02

## 2022-06-02 RX ORDER — LANOLIN ALCOHOL/MO/W.PET/CERES
3 CREAM (GRAM) TOPICAL AT BEDTIME
Refills: 0 | Status: DISCONTINUED | OUTPATIENT
Start: 2022-06-02 | End: 2022-06-04

## 2022-06-02 RX ORDER — LOSARTAN POTASSIUM 100 MG/1
100 TABLET, FILM COATED ORAL DAILY
Refills: 0 | Status: DISCONTINUED | OUTPATIENT
Start: 2022-06-03 | End: 2022-06-04

## 2022-06-02 RX ORDER — HALOPERIDOL DECANOATE 100 MG/ML
2 INJECTION INTRAMUSCULAR ONCE
Refills: 0 | Status: COMPLETED | OUTPATIENT
Start: 2022-06-02 | End: 2022-06-02

## 2022-06-02 RX ORDER — QUETIAPINE FUMARATE 200 MG/1
2 TABLET, FILM COATED ORAL
Qty: 0 | Refills: 0 | DISCHARGE

## 2022-06-02 RX ORDER — ENOXAPARIN SODIUM 100 MG/ML
40 INJECTION SUBCUTANEOUS EVERY 24 HOURS
Refills: 0 | Status: DISCONTINUED | OUTPATIENT
Start: 2022-06-02 | End: 2022-06-04

## 2022-06-02 RX ORDER — HYDROCHLOROTHIAZIDE 25 MG
12.5 TABLET ORAL DAILY
Refills: 0 | Status: DISCONTINUED | OUTPATIENT
Start: 2022-06-02 | End: 2022-06-04

## 2022-06-02 RX ORDER — INSULIN LISPRO 100/ML
VIAL (ML) SUBCUTANEOUS AT BEDTIME
Refills: 0 | Status: DISCONTINUED | OUTPATIENT
Start: 2022-06-02 | End: 2022-06-04

## 2022-06-02 RX ORDER — LOSARTAN POTASSIUM 100 MG/1
50 TABLET, FILM COATED ORAL DAILY
Refills: 0 | Status: DISCONTINUED | OUTPATIENT
Start: 2022-06-02 | End: 2022-06-02

## 2022-06-02 RX ORDER — INSULIN GLARGINE 100 [IU]/ML
5 INJECTION, SOLUTION SUBCUTANEOUS AT BEDTIME
Refills: 0 | Status: DISCONTINUED | OUTPATIENT
Start: 2022-06-02 | End: 2022-06-03

## 2022-06-02 RX ORDER — SITAGLIPTIN AND METFORMIN HYDROCHLORIDE 500; 50 MG/1; MG/1
1 TABLET, FILM COATED ORAL
Qty: 0 | Refills: 0 | DISCHARGE

## 2022-06-02 RX ORDER — NIFEDIPINE 30 MG
1 TABLET, EXTENDED RELEASE 24 HR ORAL
Qty: 0 | Refills: 0 | DISCHARGE

## 2022-06-02 RX ORDER — ACETAMINOPHEN 500 MG
650 TABLET ORAL EVERY 6 HOURS
Refills: 0 | Status: DISCONTINUED | OUTPATIENT
Start: 2022-06-02 | End: 2022-06-04

## 2022-06-02 RX ORDER — GLIMEPIRIDE 1 MG
1 TABLET ORAL
Qty: 0 | Refills: 0 | DISCHARGE

## 2022-06-02 RX ORDER — LOSARTAN POTASSIUM 100 MG/1
50 TABLET, FILM COATED ORAL ONCE
Refills: 0 | Status: COMPLETED | OUTPATIENT
Start: 2022-06-02 | End: 2022-06-02

## 2022-06-02 RX ORDER — DONEPEZIL HYDROCHLORIDE 10 MG/1
10 TABLET, FILM COATED ORAL AT BEDTIME
Refills: 0 | Status: DISCONTINUED | OUTPATIENT
Start: 2022-06-02 | End: 2022-06-03

## 2022-06-02 RX ADMIN — DONEPEZIL HYDROCHLORIDE 10 MILLIGRAM(S): 10 TABLET, FILM COATED ORAL at 21:17

## 2022-06-02 RX ADMIN — ENOXAPARIN SODIUM 40 MILLIGRAM(S): 100 INJECTION SUBCUTANEOUS at 16:01

## 2022-06-02 RX ADMIN — HALOPERIDOL DECANOATE 2 MILLIGRAM(S): 100 INJECTION INTRAMUSCULAR at 23:43

## 2022-06-02 RX ADMIN — Medication 30 MILLILITER(S): at 12:38

## 2022-06-02 RX ADMIN — MORPHINE SULFATE 4 MILLIGRAM(S): 50 CAPSULE, EXTENDED RELEASE ORAL at 12:38

## 2022-06-02 RX ADMIN — FAMOTIDINE 20 MILLIGRAM(S): 10 INJECTION INTRAVENOUS at 12:39

## 2022-06-02 RX ADMIN — GABAPENTIN 100 MILLIGRAM(S): 400 CAPSULE ORAL at 21:17

## 2022-06-02 RX ADMIN — Medication 12.5 MILLIGRAM(S): at 16:02

## 2022-06-02 RX ADMIN — Medication 3: at 16:30

## 2022-06-02 RX ADMIN — LOSARTAN POTASSIUM 50 MILLIGRAM(S): 100 TABLET, FILM COATED ORAL at 16:02

## 2022-06-02 RX ADMIN — MORPHINE SULFATE 4 MILLIGRAM(S): 50 CAPSULE, EXTENDED RELEASE ORAL at 15:57

## 2022-06-02 RX ADMIN — LOSARTAN POTASSIUM 50 MILLIGRAM(S): 100 TABLET, FILM COATED ORAL at 20:32

## 2022-06-02 NOTE — ED PROVIDER NOTE - CARE PLAN
1 Principal Discharge DX:	Symptomatic bradycardia  Secondary Diagnosis:	Fall  Secondary Diagnosis:	VERDUZCO (dyspnea on exertion)

## 2022-06-02 NOTE — H&P ADULT - NSICDXPASTMEDICALHX_GEN_ALL_CORE_FT
PAST MEDICAL HISTORY:  Deep vein thrombosis (DVT), right     Dementia     DM (diabetes mellitus)     HLD (hyperlipidemia)     HTN (hypertension)     Knee pain, chronic, right     Neuropathy of lower extremity     RA (rheumatoid arthritis)     Reflux

## 2022-06-02 NOTE — ED PROVIDER NOTE - CLINICAL SUMMARY MEDICAL DECISION MAKING FREE TEXT BOX
Patient with fall yesterday, unable to provide additional history. Will perform CT scan, labs, X-Ray, and reassess.

## 2022-06-02 NOTE — CHART NOTE - NSCHARTNOTEFT_GEN_A_CORE
CODE ALYSA was called as patient was agitated and disruptive to the unit.   Patient is new admission to the floor for HTN urgency and Bradycardia, PMHx HTN, DM, and Dementia. On admission patient was accompanied by Daughter, Delfina Her [phone], patient noted to be at baseline.   According to nurses on unit, patient mentation worsening as evening progressed refusing tele boxes and vitals, despite re-orientation. Patient became disruptive and agitated on the unit, yelling in the hallway. Patient refused to cooperate with staff, being verbally aggressive and combative. Notified daughter Delfina in attempt to de-escalate and reorientate patient. Patient calmed by daughter only when on the phone with daughter, then regressed to being agitated and aggressive. Notified daughter that patient will be needing a sedation for safety.  Given Haldol 2mg IVP x1 STAT.   Daughter given permission to come and stay with the patient overnight. No further events reported.   Will endorse to the primary team.

## 2022-06-02 NOTE — PATIENT PROFILE ADULT - WILL THE PATIENT ACCEPT THE PFIZER COVID-19 VACCINE IF ELIGIBLE AND IT IS AVAILABLE?
Anticoagulation Clinic - Remote Progress Note  REMOTE LAB    Indication: chronic afib  Referring Provider: Laura  Initial Warfarin Start Date:   Goal INR: 2 - 3  Current Drug Interactions: aspirin, ranitidine  CHADS-VASc:     Diet: GLV: varies, may have salad 2-3x a week or sometimes nothing for the month  Alcohol: none  Tobacco: 1 can of dip/smokeless tobacco a day  OTC Pain Medication: Ibuprofen or APAP -- advised to minimize ibuprofen use, if at all, and to use APAP whenever possible    INR History:  Date 8/23 9/26 10/24 11/22 12/19 1/23/18 2/21 3/20 4/24 5/21 6/20 7/24 8/7   Total Weekly Dose 70mg 70mg 70mg 70mg 70mg 70mg 70mg 70mg 70mg 70mg 70mg 70mg 70mg   INR 2.9 2.3 2.4 2.9 2.8 2.4 2.5 2.3 2.9 2.1 2.9 3.1 2.1   Notes  1st clinic                Date 8/21               Total Weekly Dose 70mg               INR 2.3               Notes                  Phone Interview:  Tablet Strength: 10mg tablets  Current Maintenance Dose: 10mg every day  Patient Contact Info: [phone] or [phone] (Wife, Geni)  Lab Contact Info: [facility] [phone]    Patient Findings   Negatives:   Signs/symptoms of thrombosis, Signs/symptoms of bleeding, Laboratory test error suspected, Change in health, Change in alcohol use, Change in activity, Upcoming invasive procedure, Emergency department visit, Upcoming dental procedure, Missed doses, Extra doses, Change in medications, Change in diet/appetite, Hospital admission, Bruising, Other complaints     Plan:  1. INR is therapeutic today at 2.3. Instructed patient to continue warfarin 10mg daily.  2. Repeat INR in 4 weeks, 9/18.  3. Verbal information provided over the phone to patient, who RBV dosing instructions, expresses teach back with understanding, and has no further questions at this time.     Fredrick Rosales, Zoila  8/21/2018  3:52 PM    Maricarmen MARTIN, PharmD, have reviewed the note in full and agree with the assessment and plan.  08/21/18  4:09 PM    
No

## 2022-06-02 NOTE — ED PROVIDER NOTE - EKG ADDITIONAL QUESTION - PERFORMED INDEPENDENT VISUALIZATION
Scribe Attestation (For Scribes USE Only)... Attending Attestation (For Attendings USE Only).../Scribe Attestation (For Scribes USE Only)... Yes

## 2022-06-02 NOTE — ED PROVIDER NOTE - NSICDXPASTSURGICALHX_GEN_ALL_CORE_FT
PAST SURGICAL HISTORY:  S/P appendectomy     S/P colon resection     S/P eye surgery, follow-up exam right eye    S/P partial hysterectomy     Total knee replacement status left 2004, right 2014

## 2022-06-02 NOTE — H&P ADULT - HISTORY OF PRESENT ILLNESS
81 year old F from home, ambulates independently, has past medical hx of hypertension, DM type 2, HLD not on any meds, dementia came to ED after mechanical fall yesterday while walking on the street under the rain accompanied by her daughter. Patient and daughter referred she fell on her right side and now is c/o right sided ribcage pain, denies any premonitory symptoms like dizziness, chest pain, palpitations, headache, LOC, head trauma or any other symptoms. Of note, daughter refers her mother gets mild sob but does not really affect her daily activities.

## 2022-06-02 NOTE — H&P ADULT - ASSESSMENT
81 year old F from home, ambulates independently, has past medical hx of hypertension, DM type 2, HLD not on any meds, dementia is admitted to telemetry for hypertensive urgency

## 2022-06-02 NOTE — ED PROVIDER NOTE - PROGRESS NOTE DETAILS
daughter at bedside states patient has been having VERDUZCO recently. Not on beta blockers. Denies fainting yesterday. States they were walking briskly in the rain and patient fell. unclear if mechanical or presyncope. admit for symptomatic bradycardia, prior EKG in system reveals no bradycardia. patient persistently bradycardic in Emergency Department.

## 2022-06-02 NOTE — ED PROVIDER NOTE - NSICDXPASTMEDICALHX_GEN_ALL_CORE_FT
PAST MEDICAL HISTORY:  Deep vein thrombosis (DVT), right     DM (diabetes mellitus)     HLD (hyperlipidemia)     HTN (hypertension)     Knee pain, chronic, right     Neuropathy of lower extremity     RA (rheumatoid arthritis)     Reflux       Dementia

## 2022-06-02 NOTE — H&P ADULT - ATTENDING COMMENTS
Patient is an 81 year old F from home, ambulates independently, PMHx  of hypertension, DM type 2, HLD, dementia presented to ED after mechanical fall while walking in the rain yesterday. Patient is unsure is she slipped but denies any pre-syncope, syncope, chest pain or any prodromal symptoms prior to fall. Per daughter, pt fell on her Rt side hitting her arm and chest. She c/o Rt sided chest wall discomfort which is worse with movement or breathing.  Patient also c/o occasional shortness which has been ongoing for some time.     In Ed, she was noted to be hypertensive and bradycardic to 45. Patient is an 81 year old F from home, ambulates independently, PMHx  of hypertension, DM type 2, HLD, dementia presented to ED after mechanical fall while walking in the rain yesterday. Patient is unsure is she slipped but denies any pre-syncope, syncope, chest pain or any prodromal symptoms prior to fall. Per daughter, pt fell on her Rt side hitting her arm and chest. She c/o Rt sided chest wall discomfort which is worse with movement or breathing.  Patient also c/o occasional shortness which has been ongoing for some time.     In Ed, she was noted to be hypertensive and bradycardic to 45, though asymptomatic.     Labs reviewed    PE as above     A/P:  #Hypertensive Urgency  #Sinus Bradycardia  #Mechanical fall  #Uncontrolled Diabetes   #Dementia  #HLD    Plan:  -Patient with hx of HTN, BP noted to be 195/61. On Losartan- HCTz at home, reports that medication was adjusted by out-patient physician recently as she runs high. Target reduction in MAP no more than 25-30% in the first 1-4 hours. Would resume home medications, can up-titrate  losartan to 100 mg if remains elevated.   -She p/w mechanical fall, no syncopal event reported. EKG showed- Sinus bradycardia. No events on tele so far except Sinus thaddeus with no associated symptoms. No fractures noted on radiographic imaging. C/w pain control. In light of recent fall and bradycardia, can monitor on tele for 24 hrs.   -Pt has hx of DM, on Janumet and Glimepiride at home. RBS in 300, C/w HSS. Start Lantus 5 units   -C/w donepezil

## 2022-06-02 NOTE — ED ADULT TRIAGE NOTE - CHIEF COMPLAINT QUOTE
s/p fall when walking to the car yesterday falling on right side injuring right knee, right wrist and right ribs. Denies hitting head, no LOC. Hx Dementia.

## 2022-06-02 NOTE — H&P ADULT - NSHPPHYSICALEXAM_GEN_ALL_CORE
ICU Vital Signs Last 24 Hrs  T(C): 36.8 (02 Jun 2022 15:13), Max: 37.1 (02 Jun 2022 14:51)  T(F): 98.3 (02 Jun 2022 15:13), Max: 98.7 (02 Jun 2022 14:51)  HR: 74 (02 Jun 2022 15:13) (46 - 74)  BP: 187/50 (02 Jun 2022 15:23) (150/69 - 195/61)  RR: 19 (02 Jun 2022 15:13) (16 - 19)  SpO2: 96% (02 Jun 2022 15:13) (96% - 99%)    GENERAL: NAD, average weight, sat well on RA  HEAD:  Atraumatic, Normocephalic  EYES:  conjunctiva and sclera clear  NECK: Supple, No JVD, Normal thyroid  CHEST/LUNG: Clear to auscultation. Clear to percussion bilaterally; No rales, rhonchi, wheezing, or rubs, no deformities  HEART: Regular rate and rhythm; No murmurs, rubs, or gallops  ABDOMEN: Soft, Nontender, Nondistended; Bowel sounds present, no pain or masses on palpation   NERVOUS SYSTEM:  Alert & Oriented X3, forgetful   EXTREMITIES:  2+ Peripheral Pulses, No clubbing, cyanosis, or edema  : voiding well   SKIN: warm, dry

## 2022-06-02 NOTE — ED PROVIDER NOTE - OBJECTIVE STATEMENT
81 year old female with dementia presents to ED complaining of rib and wrist pain after a fall yesterday. Patient does not remember falling. NKDA.

## 2022-06-02 NOTE — PHARMACOTHERAPY INTERVENTION NOTE - COMMENTS
Patient's home pharmacy ([facility] [address] [facility] [phone]) on record was contacted and the Outside Medication Record was updated based on the pharmacy prescription history.

## 2022-06-02 NOTE — H&P ADULT - PROBLEM SELECTOR PLAN 1
- On admission, patients /55 most likely 2/2 pain  - On Losartan HCTZ at home   - Target reduction in MAP no more than 25-30% in the first 1-4 hours then BP < =160/100    - Resumed home meds  - Monitor BP and adjust meds as needed  - F/u echo, trop

## 2022-06-02 NOTE — H&P ADULT - PROBLEM SELECTOR PLAN 4
- Patient has hx of DM on Janumet, glimepiride at home   - Started on HSS  - Fingerstick before meals and at bedtime  - DASH diet with consistent carb  - Target -180  - F/u A1c

## 2022-06-02 NOTE — H&P ADULT - PROBLEM SELECTOR PLAN 3
- Patient with mechanical falls came to ED s/p fall yesterday   - CTH, CT C-S and CT chest noted   - NO fractures or acute pathology on imaging, needs outpatient follow up for lung nodules and thyroid findings   - Fall precautions  - PT consulted

## 2022-06-02 NOTE — PATIENT PROFILE ADULT - FALL HARM RISK - HARM RISK INTERVENTIONS
Assistance with ambulation/Assistance OOB with selected safe patient handling equipment/Communicate Risk of Fall with Harm to all staff/Monitor for mental status changes/Move patient closer to nurses' station/Reinforce activity limits and safety measures with patient and family/Reorient to person, place and time as needed/Tailored Fall Risk Interventions/Toileting schedule using arm’s reach rule for commode and bathroom/Use of alarms - bed, chair and/or voice tab/Visual Cue: Yellow wristband and red socks/Bed in lowest position, wheels locked, appropriate side rails in place/Call bell, personal items and telephone in reach/Instruct patient to call for assistance before getting out of bed or chair/Non-slip footwear when patient is out of bed/[place] to call system/Physically safe environment - no spills, clutter or unnecessary equipment/Purposeful Proactive Rounding/Room/bathroom lighting operational, light cord in reach

## 2022-06-02 NOTE — H&P ADULT - NSHPREVIEWOFSYSTEMS_GEN_ALL_CORE
Constitutional- no fever, chills, weight loss, weight gain or generalized weakness  Eyes – no vision loss or changes, no pain, no redness  ENT  – No hearing changes, no tinnitus, no discharge, no pain  - No runny nose, no congestion, no pain  - No sore throat, no hoarseness, no mouth sores, no voice change  CVS – No chest pain/no palpitations, or SOB  Respiratory - no cough, hemoptysis, wheezing, or SOB  GI – no dysphagia, heartburn, nausea, anorexia, emesis, diarrhea, abd pain, or change in bowel habits   – no frequency, urgency, hesitancy, nocturia, discharge, or weak stream  Skin – no rashes, lumps, or pruritus  ROZINA – no joint pain, stiffness, back pain, redness or swelling in joints, right sided ribcage pain  Psych – no confusion, depression , anxiety, or insomnia  Neuro – no headache dizziness, syncope, seizures, tremors, numbness, amnesia, or falls  Endocrine – no cold, heat intolerance, sweating, excessive hunger or thirst

## 2022-06-02 NOTE — ED PROVIDER NOTE - MUSCULOSKELETAL, MLM
Right wrist tenderness to palpation, no visible deformity. Right anterior upper and lower rib tenderness to palpation. No bruising, no crepitus. No back or abdomen tenderness to palpation. No visible trauma to head. Right wrist tenderness to palpation, right knee tenderness to palpation no visible deformity. Right anterior upper and lower rib tenderness to palpation. No bruising, no crepitus. No back or abdomen tenderness to palpation. No visible trauma to head.

## 2022-06-02 NOTE — H&P ADULT - PROBLEM SELECTOR PLAN 2
- Patient with sinus bradycardia not on BB at home  - Asymptomatic, denies chest pain or dizziness   - -F/u echo  - Monitor on tele

## 2022-06-03 LAB
GLUCOSE BLDC GLUCOMTR-MCNC: 204 MG/DL — HIGH (ref 70–99)
GLUCOSE BLDC GLUCOMTR-MCNC: 205 MG/DL — HIGH (ref 70–99)
GLUCOSE BLDC GLUCOMTR-MCNC: 248 MG/DL — HIGH (ref 70–99)
TSH SERPL-MCNC: 1.76 UU/ML — SIGNIFICANT CHANGE UP (ref 0.34–4.82)

## 2022-06-03 PROCEDURE — 99233 SBSQ HOSP IP/OBS HIGH 50: CPT | Mod: GC

## 2022-06-03 RX ORDER — SODIUM CHLORIDE 9 MG/ML
1000 INJECTION, SOLUTION INTRAVENOUS
Refills: 0 | Status: DISCONTINUED | OUTPATIENT
Start: 2022-06-03 | End: 2022-06-04

## 2022-06-03 RX ORDER — INSULIN GLARGINE 100 [IU]/ML
15 INJECTION, SOLUTION SUBCUTANEOUS AT BEDTIME
Refills: 0 | Status: DISCONTINUED | OUTPATIENT
Start: 2022-06-03 | End: 2022-06-04

## 2022-06-03 RX ORDER — HALOPERIDOL DECANOATE 100 MG/ML
1 INJECTION INTRAMUSCULAR AT BEDTIME
Refills: 0 | Status: DISCONTINUED | OUTPATIENT
Start: 2022-06-03 | End: 2022-06-04

## 2022-06-03 RX ADMIN — ENOXAPARIN SODIUM 40 MILLIGRAM(S): 100 INJECTION SUBCUTANEOUS at 17:29

## 2022-06-03 RX ADMIN — Medication 12.5 MILLIGRAM(S): at 06:07

## 2022-06-03 RX ADMIN — HALOPERIDOL DECANOATE 1 MILLIGRAM(S): 100 INJECTION INTRAMUSCULAR at 21:34

## 2022-06-03 RX ADMIN — MEMANTINE HYDROCHLORIDE 5 MILLIGRAM(S): 10 TABLET ORAL at 11:56

## 2022-06-03 RX ADMIN — Medication 2: at 17:29

## 2022-06-03 RX ADMIN — GABAPENTIN 100 MILLIGRAM(S): 400 CAPSULE ORAL at 06:08

## 2022-06-03 RX ADMIN — Medication 3 MILLIGRAM(S): at 21:33

## 2022-06-03 RX ADMIN — GABAPENTIN 100 MILLIGRAM(S): 400 CAPSULE ORAL at 21:33

## 2022-06-03 RX ADMIN — LOSARTAN POTASSIUM 100 MILLIGRAM(S): 100 TABLET, FILM COATED ORAL at 06:12

## 2022-06-03 RX ADMIN — GABAPENTIN 100 MILLIGRAM(S): 400 CAPSULE ORAL at 14:36

## 2022-06-03 RX ADMIN — INSULIN GLARGINE 15 UNIT(S): 100 INJECTION, SOLUTION SUBCUTANEOUS at 21:34

## 2022-06-03 RX ADMIN — Medication 2: at 11:56

## 2022-06-03 NOTE — PHYSICAL THERAPY INITIAL EVALUATION ADULT - GENERAL OBSERVATIONS, REHAB EVAL
Pt. received sitting in chair, NAD, on telemetry. Pt. cooperative and motivated during eval.  Pt. A&O x 2 with periods of confusion and forgetfulness

## 2022-06-03 NOTE — PHYSICAL THERAPY INITIAL EVALUATION ADULT - CRITERIA FOR SKILLED THERAPEUTIC INTERVENTIONS
Pt. presents without gross impairment and is at her prior level of functional mobility. Skilled therapeutic PT intervention not indicated at this time. Pt. will not be placed on PT program./anticipated discharge recommendation

## 2022-06-03 NOTE — PROGRESS NOTE ADULT - PROBLEM SELECTOR PLAN 2
- Patient with sinus bradycardia not on BB at home  - Asymptomatic, denies chest pain or dizziness   - F/u echo  - Monitor on tele  - TSH WNL  - D/c Donepezil d/t bradycardia SE  - Started on IVF

## 2022-06-03 NOTE — PROGRESS NOTE ADULT - ATTENDING COMMENTS
Patient seen and examined     81 year old F from home, ambulates independently, PMHx  of hypertension, DM type 2, HLD, dementia presented to ED after mechanical fall while walking in the rain yesterday. Patient is unsure is she slipped but denies any pre-syncope, syncope, chest pain or any prodromal symptoms prior to fall. Per daughter, pt fell on her Rt side hitting her arm and chest. She c/o Rt sided chest wall discomfort which is worse with movement or breathing.  Patient also c/o occasional shortness which has been ongoing for some time.     In Ed, she was noted to be hypertensive and bradycardic to 45, though asymptomatic.     Labs reviewed    PE as above     A/P:  #Hypertensive Urgency  #Sinus Bradycardia  #Mechanical fall  #Uncontrolled Diabetes   #Dementia  #HLD    Plan:  -Patient with hx of HTN, BP noted to be 195/61. On Losartan- HCTz at home, reports that medication was adjusted by out-patient physician recently as she runs high. Target reduction in MAP no more than 25-30% in the first 1-4 hours. Would resume home medications, can up-titrate  losartan to 100 mg if remains elevated.   -She p/w mechanical fall, no syncopal event reported. EKG showed- Sinus bradycardia. No events on tele so far except Sinus thaddeus with no associated symptoms. No fractures noted on radiographic imaging. C/w pain control. In light of recent fall and bradycardia, can monitor on tele for 24 hrs.   -Pt has hx of DM, on Janumet and Glimepiride at home. RBS in 300, C/w HSS. Start Lantus 5 units   -C/w donepezil. Patient seen and examined     81 year old F from home, ambulates independently, PMHx  of hypertension, DM type 2, HLD, dementia presented to ED after mechanical fall while walking in the rain yesterday. Patient is unsure is she slipped but denies any pre-syncope, syncope, chest pain or any prodromal symptoms prior to fall. Per daughter, pt fell on her Rt side hitting her arm and chest. She c/o Rt sided chest wall discomfort which is worse with movement or breathing.  Patient also c/o occasional shortness which has been ongoing for some time.     In Ed, she was noted to be hypertensive and bradycardic to 45, though asymptomatic.     Vital Signs Last 24 Hrs  T(C): 36.7 (03 Jun 2022 19:34), Max: 36.8 (03 Jun 2022 11:15)  T(F): 98 (03 Jun 2022 19:34), Max: 98.2 (03 Jun 2022 11:15)  HR: 59 (03 Jun 2022 19:34) (53 - 71)  BP: 173/61 (03 Jun 2022 19:34) (151/67 - 188/66)  BP(mean): 89 (03 Jun 2022 19:34) (89 - 107)  RR: 18 (03 Jun 2022 19:34) (18 - 19)  SpO2: 97% (03 Jun 2022 19:34) (94% - 98%)    P/E:  Psych: AAO x2; Pleaseantly demented  Neuro: No gross focal deficits; Power and sensation intact  CVS: S1S2 present, regular, no edema  Resp: BLAE+, No wheeze or Rhonchi  GI: Soft, BS+, Non tender, non distended  Extr: No  calf tenderness B/L Lower extremities  Skin: Warm and moist without any rashes      Labs reviewed    PE as above     A/P:  #Hypertensive Urgency  #Sinus Bradycardia  #Mechanical fall  #Uncontrolled Diabetes   #Dementia  #HLD    Plan:  -Patient with hx of HTN, BP noted to be 195/61. On Losartan- HCTz at home, reports that medication was adjusted by out-patient physician recently as she runs high. Target reduction in MAP no more than 25-30% in the first 1-4 hours. Would resume home medications, can up-titrate  losartan to 100 mg if remains elevated.   D/C Donepezil can contribute to bradycardia and no clear indication as patiwent has modertae dementyia at least  -She p/w mechanical fall, no syncopal event reported. EKG showed- Sinus bradycardia. No events on tele so far except Sinus thaddeus with no associated symptoms. No fractures noted on radiographic imaging. C/w pain control. In light of recent fall and bradycardia, can monitor on tele for 24 hrs.   -Pt has hx of DM, on Janumet and Glimepiride at home. RBS in 300, C/w HSS. Increase Lantus to 15 units HS; ISS Patient seen and examined     81 year old F from home, ambulates independently, PMHx  of hypertension, DM type 2, HLD, dementia presented to ED after mechanical fall while walking in the rain yesterday. Patient is unsure is she slipped but denies any pre-syncope, syncope, chest pain or any prodromal symptoms prior to fall. Per daughter, pt fell on her Rt side hitting her arm and chest. She c/o Rt sided chest wall discomfort which is worse with movement or breathing.  Patient also c/o occasional shortness which has been ongoing for some time.     In Ed, she was noted to be hypertensive and bradycardic to 45, though asymptomatic.     Vital Signs Last 24 Hrs  T(C): 36.7 (03 Jun 2022 19:34), Max: 36.8 (03 Jun 2022 11:15)  T(F): 98 (03 Jun 2022 19:34), Max: 98.2 (03 Jun 2022 11:15)  HR: 59 (03 Jun 2022 19:34) (53 - 71)  BP: 173/61 (03 Jun 2022 19:34) (151/67 - 188/66)  BP(mean): 89 (03 Jun 2022 19:34) (89 - 107)  RR: 18 (03 Jun 2022 19:34) (18 - 19)  SpO2: 97% (03 Jun 2022 19:34) (94% - 98%)    P/E:  Psych: AAO x2; Pleaseantly demented  Neuro: No gross focal deficits; Power and sensation intact  CVS: S1S2 present, regular, no edema  Resp: BLAE+, No wheeze or Rhonchi  GI: Soft, BS+, Non tender, non distended  Extr: No  calf tenderness B/L Lower extremities  Skin: Warm and moist without any rashes      Labs reviewed    PE as above     A/P:  #Hypertensive Urgency improved  #Sinus Bradycardia resolved  #Mechanical fall likely mechanical  #Uncontrolled Diabetes likely dietary non compliance  #Dementia modertae with intermittent behavoiral disturbance  #HLD    Plan:  Continue Losartan; if BP elevated will consider increase HCTZ  D/C Donepezil can contribute to bradycardia and no clear indication as patient has moderate dementia at least  In light of recent fall and bradycardia, can monitor on tele for 24 hrs more  Echo patient refused unable to perform due to refusal due to dementia  -Pt has hx of DM, on Janumet and Glimepiride at home. RBS in 300, C/w HSS. Increase Lantus to 15 units HS; ISS  Resume home Janumet and Glipeiride; F/U Endo as outpatient  As per Daughter they give medications and patient has HHA for few hours,  Few hrs duroing 8AM to 1PM she may be alone and has wandered off but neighbors aware of her and calls family  PATIENT WOULD BENEFIT FORM INCREASED HOME HEALTH AIDE HOURS AS OUTPATIENT  Discussed with daughter Delfina [phone] at bedside in the evening; advised D/C Home AM  Discussed with Housestaandreas

## 2022-06-03 NOTE — PHYSICAL THERAPY INITIAL EVALUATION ADULT - PERTINENT HX OF CURRENT PROBLEM, REHAB EVAL
Pt. admitted due to mechanical fall yesterday while walking on the street under the rain accompanied by her daughter. Imaging revealed no acute fractures.

## 2022-06-04 ENCOUNTER — TRANSCRIPTION ENCOUNTER (OUTPATIENT)
Age: 82
End: 2022-06-04

## 2022-06-04 VITALS
TEMPERATURE: 99 F | HEART RATE: 61 BPM | DIASTOLIC BLOOD PRESSURE: 70 MMHG | OXYGEN SATURATION: 95 % | SYSTOLIC BLOOD PRESSURE: 159 MMHG | RESPIRATION RATE: 17 BRPM

## 2022-06-04 LAB
GLUCOSE BLDC GLUCOMTR-MCNC: 223 MG/DL — HIGH (ref 70–99)
GLUCOSE BLDC GLUCOMTR-MCNC: 229 MG/DL — HIGH (ref 70–99)

## 2022-06-04 PROCEDURE — 83735 ASSAY OF MAGNESIUM: CPT

## 2022-06-04 PROCEDURE — 99239 HOSP IP/OBS DSCHRG MGMT >30: CPT | Mod: GC

## 2022-06-04 PROCEDURE — 97162 PT EVAL MOD COMPLEX 30 MIN: CPT

## 2022-06-04 PROCEDURE — 96374 THER/PROPH/DIAG INJ IV PUSH: CPT

## 2022-06-04 PROCEDURE — 84484 ASSAY OF TROPONIN QUANT: CPT

## 2022-06-04 PROCEDURE — 93306 TTE W/DOPPLER COMPLETE: CPT

## 2022-06-04 PROCEDURE — 96375 TX/PRO/DX INJ NEW DRUG ADDON: CPT

## 2022-06-04 PROCEDURE — 99285 EMERGENCY DEPT VISIT HI MDM: CPT

## 2022-06-04 PROCEDURE — 80053 COMPREHEN METABOLIC PANEL: CPT

## 2022-06-04 PROCEDURE — 93005 ELECTROCARDIOGRAM TRACING: CPT

## 2022-06-04 PROCEDURE — 82962 GLUCOSE BLOOD TEST: CPT

## 2022-06-04 PROCEDURE — 85025 COMPLETE CBC W/AUTO DIFF WBC: CPT

## 2022-06-04 PROCEDURE — 84443 ASSAY THYROID STIM HORMONE: CPT

## 2022-06-04 PROCEDURE — 93306 TTE W/DOPPLER COMPLETE: CPT | Mod: 26

## 2022-06-04 PROCEDURE — 73562 X-RAY EXAM OF KNEE 3: CPT

## 2022-06-04 PROCEDURE — 73110 X-RAY EXAM OF WRIST: CPT

## 2022-06-04 PROCEDURE — 36415 COLL VENOUS BLD VENIPUNCTURE: CPT

## 2022-06-04 PROCEDURE — 82550 ASSAY OF CK (CPK): CPT

## 2022-06-04 PROCEDURE — 87635 SARS-COV-2 COVID-19 AMP PRB: CPT

## 2022-06-04 PROCEDURE — 72125 CT NECK SPINE W/O DYE: CPT | Mod: MA

## 2022-06-04 PROCEDURE — 70450 CT HEAD/BRAIN W/O DYE: CPT | Mod: MA

## 2022-06-04 PROCEDURE — 71250 CT THORAX DX C-: CPT | Mod: MA

## 2022-06-04 RX ORDER — DONEPEZIL HYDROCHLORIDE 10 MG/1
1 TABLET, FILM COATED ORAL
Qty: 0 | Refills: 0 | DISCHARGE

## 2022-06-04 RX ORDER — LOSARTAN/HYDROCHLOROTHIAZIDE 100MG-25MG
1 TABLET ORAL
Qty: 0 | Refills: 0 | DISCHARGE

## 2022-06-04 RX ORDER — HYDROCHLOROTHIAZIDE 25 MG
1 TABLET ORAL
Qty: 30 | Refills: 0
Start: 2022-06-04 | End: 2022-07-03

## 2022-06-04 RX ORDER — MEMANTINE HYDROCHLORIDE 10 MG/1
1 TABLET ORAL
Qty: 0 | Refills: 0 | DISCHARGE

## 2022-06-04 RX ORDER — METFORMIN HYDROCHLORIDE 850 MG/1
1000 TABLET ORAL
Refills: 0 | Status: DISCONTINUED | OUTPATIENT
Start: 2022-06-04 | End: 2022-06-04

## 2022-06-04 RX ORDER — HYDROCHLOROTHIAZIDE 25 MG
12.5 TABLET ORAL ONCE
Refills: 0 | Status: COMPLETED | OUTPATIENT
Start: 2022-06-04 | End: 2022-06-04

## 2022-06-04 RX ORDER — LOSARTAN POTASSIUM 100 MG/1
1 TABLET, FILM COATED ORAL
Qty: 30 | Refills: 0
Start: 2022-06-04 | End: 2022-07-03

## 2022-06-04 RX ADMIN — LOSARTAN POTASSIUM 100 MILLIGRAM(S): 100 TABLET, FILM COATED ORAL at 06:55

## 2022-06-04 RX ADMIN — Medication 2: at 12:03

## 2022-06-04 RX ADMIN — Medication 2: at 08:24

## 2022-06-04 RX ADMIN — MEMANTINE HYDROCHLORIDE 5 MILLIGRAM(S): 10 TABLET ORAL at 11:10

## 2022-06-04 RX ADMIN — GABAPENTIN 100 MILLIGRAM(S): 400 CAPSULE ORAL at 06:55

## 2022-06-04 RX ADMIN — METFORMIN HYDROCHLORIDE 1000 MILLIGRAM(S): 850 TABLET ORAL at 10:23

## 2022-06-04 RX ADMIN — GABAPENTIN 100 MILLIGRAM(S): 400 CAPSULE ORAL at 13:04

## 2022-06-04 RX ADMIN — Medication 12.5 MILLIGRAM(S): at 11:10

## 2022-06-04 RX ADMIN — Medication 12.5 MILLIGRAM(S): at 06:55

## 2022-06-04 NOTE — DISCHARGE NOTE NURSING/CASE MANAGEMENT/SOCIAL WORK - NSDCPEFALRISK_GEN_ALL_CORE
For information on Fall & Injury Prevention, visit: [email or web address] OR  [email or web address] OR  https://www.cdc.gov/steadi/patient.html

## 2022-06-04 NOTE — DISCHARGE NOTE PROVIDER - ATTENDING DISCHARGE PHYSICAL EXAMINATION:
Psych: AAO x2; Dementia; stable mood this morning  Neuro: No gross focal deficits; Power and sensation intact  Gait: balanced  CVS: S1S2 present, regular, no edema  Resp: BLAE+, No wheeze or Rhonchi  GI: Soft, BS+, Non tender, non distended  Extr: No  calf tenderness B/L Lower extremities  Skin: Warm and moist without any rashes

## 2022-06-04 NOTE — DISCHARGE NOTE PROVIDER - NSDCFUSCHEDAPPT_GEN_ALL_CORE_FT
Laxmi Moss  [facility]  Neuro [address] [place] [address]  Scheduled Appointment: 08/10/2022

## 2022-06-04 NOTE — PROGRESS NOTE ADULT - PROBLEM SELECTOR PLAN 4
- Patient has hx of DM on Janumet, glimepiride at home   - Cont HSS  - started lantus 15U  - Fingerstick before meals and at bedtime  - DASH diet with consistent carb  - Target -180
- Patient has hx of DM on Janumet, glimepiride at home   - Started on HSS  - started lantus 15U  - Fingerstick before meals and at bedtime  - DASH diet with consistent carb  - Target -180  - F/u A1c

## 2022-06-04 NOTE — DISCHARGE NOTE PROVIDER - CARE PROVIDER_API CALL
Nic Humphries)  Internal Medicine  [address] [facility], [address]th [address] [place] [address]  Phone: [phone]  Fax: [phone]  Follow Up Time: 1 week    Laxmi Moss)  Clinical Neurophysiology; Neurology; Sleep Medicine  [address] [facility] 2nd [address]  [place] [id number]  Phone: [phone]  Fax: [phone]  Follow Up Time: 1 month

## 2022-06-04 NOTE — PROGRESS NOTE ADULT - PROBLEM SELECTOR PLAN 1
- On admission, patients /55 most likely 2/2 pain  - Target reduction in MAP no more than 25-30% in the first 1-4 hours then BP < =160/100    - Monitor BP and adjust meds as needed  - d/c on losartan 100mg and HCTZ 25mg  - trop negative
- On admission, patients /55 most likely 2/2 pain  - cont Losartan HCTZ at home   - Target reduction in MAP no more than 25-30% in the first 1-4 hours then BP < =160/100    - Monitor BP and adjust meds as needed  - F/u echo,   - trop negative

## 2022-06-04 NOTE — DISCHARGE NOTE PROVIDER - NSDCDCMDCOMP_GEN_ALL_CORE
This document is complete and the patient is ready for discharge.
Stable/when stable per anesthesia protocol accompany with family

## 2022-06-04 NOTE — PROGRESS NOTE ADULT - ASSESSMENT
81 year old F from home, ambulates independently, has past medical hx of hypertension, DM type 2, HLD not on any meds, dementia is admitted to telemetry for hypertensive urgency 
81 year old F from home, ambulates independently, has past medical hx of hypertension, DM type 2, HLD not on any meds, dementia is admitted to telemetry for hypertensive urgency

## 2022-06-04 NOTE — DISCHARGE NOTE PROVIDER - HOSPITAL COURSE
81 year old F from home, ambulates independently, has past medical hx of hypertension, DM type 2, HLD not on any meds, dementia came to ED after mechanical fall yesterday while walking on the street under the rain accompanied by her daughter. Patient and daughter referred she fell on her right side and now is c/o right sided ribcage pain, denies any premonitory symptoms like dizziness, chest pain, palpitations, headache, LOC, head trauma or any other symptoms. Of note, daughter refers her mother gets mild sob but does not really affect her daily activities.     Hypertensive urgency. On admission, patients /55 most likely 2/2 pain. started on losartan 100mg. Target reduction in MAP no more than 25-30% in the first 1-4 hours then BP < =160/100. Pt refused echo. Trop negative.    Sinus bradycardia. Patient with sinus bradycardia not on BB at home. Asymptomatic, denies chest pain or dizziness. Monitor on tele. TSH WNL. D/c Donepezil d/t bradycardia SE. Started on IVF.    Fall. Patient with mechanical falls came to ED s/p fall yesterday. CTH, CT C-S and CT chest noted. NO fractures or acute pathology on imaging, needs outpatient follow up for lung nodules and thyroid findings. Fall precautions. PT consulted.    Diabetes mellitus. Patient has hx of DM on Janumet, glimepiride at home. Started on HSS. Started lantus 15U. Fingerstick before meals and at bedtime. DASH diet with consistent carb.

## 2022-06-04 NOTE — DISCHARGE NOTE NURSING/CASE MANAGEMENT/SOCIAL WORK - PATIENT PORTAL LINK FT
You can access the FollowMyHealth Patient Portal offered by [facility] by registering at the following website: [email or web address]. By joining [employer]’s FollowMyHealth portal, you will also be able to view your health information using other applications (apps) compatible with our system.

## 2022-06-04 NOTE — PROGRESS NOTE ADULT - PROBLEM SELECTOR PLAN 5
IMPROVE VTE Individual Risk Assessment  RISK                                                                Points  [  ] Previous VTE                                                  3  [  ] Thrombophilia                                               2  [  ] Lower limb paralysis                                      2        (unable to hold up >15 seconds)    [  ] Current Cancer                                              2         (within 6 months)  [x  ] Immobilization > 24 hrs                                1  [  ] ICU/CCU stay > 24 hours                              1  [ x ] Age > 60                                                      1  IMPROVE VTE Score ____2_____    PPI for GI prophylaxis  Lovenox for DVT PPX
IMPROVE VTE Individual Risk Assessment  RISK                                                                Points  [  ] Previous VTE                                                  3  [  ] Thrombophilia                                               2  [  ] Lower limb paralysis                                      2        (unable to hold up >15 seconds)    [  ] Current Cancer                                              2         (within 6 months)  [  ] Immobilization > 24 hrs                                1  [  ] ICU/CCU stay > 24 hours                              1  [  ] Age > 60                                                      1  IMPROVE VTE Score _________    PPI for GI prophylaxis  Lovenox for DVT PPX

## 2022-06-04 NOTE — PROGRESS NOTE ADULT - PROBLEM SELECTOR PLAN 2
- Patient with sinus bradycardia not on BB at home  - Asymptomatic, denies chest pain or dizziness   - Monitor on tele  - TSH WNL  - D/c Donepezil d/t bradycardia SE

## 2022-06-04 NOTE — DISCHARGE NOTE PROVIDER - NSDCMRMEDTOKEN_GEN_ALL_CORE_FT
gabapentin 100 mg oral capsule: 2 cap(s) orally 3 times a day  glimepiride 2 mg oral tablet: 1 tab(s) orally once a day (in the morning)  hydroCHLOROthiazide 25 mg oral tablet: 1 tab(s) orally once a day   Janumet 50 mg-1000 mg oral tablet: 1 tab(s) orally 2 times a day  losartan 100 mg oral tablet: 1 tab(s) orally once a day   losartan-hydrochlorothiazide 50mg-12.5mg oral tablet: 1 tab(s) orally once a day  memantine 5 mg oral tablet: 1 tab(s) orally once a day   gabapentin 100 mg oral capsule: 2 cap(s) orally 3 times a day  glimepiride 2 mg oral tablet: 1 tab(s) orally once a day (in the morning)  hydroCHLOROthiazide 25 mg oral tablet: 1 tab(s) orally once a day   Janumet 50 mg-1000 mg oral tablet: 1 tab(s) orally 2 times a day  losartan 100 mg oral tablet: 1 tab(s) orally once a day   memantine 5 mg oral tablet: 1 tab(s) orally once a day   gabapentin 100 mg oral capsule: 2 cap(s) orally 3 times a day  glimepiride 2 mg oral tablet: 1 tab(s) orally once a day (in the morning)  hydroCHLOROthiazide 25 mg oral tablet: 1 tab(s) orally once a day   Janumet 50 mg-1000 mg oral tablet: 1 tab(s) orally 2 times a day  losartan 100 mg oral tablet: 1 tab(s) orally once a day   memantine 5 mg oral tablet: 1 tab(s) orally 2 times a day

## 2022-06-04 NOTE — PROGRESS NOTE ADULT - PROBLEM SELECTOR PLAN 3
- Patient with mechanical falls came to ED s/p fall yesterday   - CTH, CT C-S and CT chest noted   - NO fractures or acute pathology on imaging, needs outpatient follow up for lung nodules and thyroid findings   - Fall precautions  - PT consulted
- Patient with mechanical falls came to ED s/p fall yesterday   - CTH, CT C-S and CT chest noted   - NO fractures or acute pathology on imaging, needs outpatient follow up for lung nodules and thyroid findings   - Fall precautions  - PT consulted  - xray neg

## 2022-06-04 NOTE — PROGRESS NOTE ADULT - SUBJECTIVE AND OBJECTIVE BOX
PGY-1 Progress Note discussed with attending    PAGER #: [[phone]] TILL 5:00 PM  PLEASE CONTACT ON CALL TEAM:  - On Call Team (Please refer to Vicky) FROM 5:00 PM - 8:30PM  - Nightfloat Team FROM 8:30 -7:30 AM    INTERVAL HPI/OVERNIGHT EVENTS:   - Patient denies all symptoms.     REVIEW OF SYSTEMS:  as stated above    MEDICATIONS  (STANDING):  enoxaparin Injectable 40 milliGRAM(s) SubCutaneous every 24 hours  gabapentin 100 milliGRAM(s) Oral three times a day  hydrochlorothiazide 12.5 milliGRAM(s) Oral daily  insulin glargine Injectable (LANTUS) 15 Unit(s) SubCutaneous at bedtime  insulin lispro (ADMELOG) corrective regimen sliding scale   SubCutaneous three times a day before meals  insulin lispro (ADMELOG) corrective regimen sliding scale   SubCutaneous at bedtime  losartan 100 milliGRAM(s) Oral daily  memantine 5 milliGRAM(s) Oral daily  sodium chloride 0.45%. 1000 milliLiter(s) (75 mL/Hr) IV Continuous <Continuous>    MEDICATIONS  (PRN):  acetaminophen     Tablet .. 650 milliGRAM(s) Oral every 6 hours PRN Temp greater or equal to 38C (100.4F), Mild Pain (1 - 3)  melatonin 3 milliGRAM(s) Oral at bedtime PRN Insomnia  ondansetron Injectable 4 milliGRAM(s) IV Push every 8 hours PRN Nausea and/or Vomiting      Vital Signs Last 24 Hrs  T(C): 36.8 (03 Jun 2022 11:15), Max: 36.8 (02 Jun 2022 15:13)  T(F): 98.2 (03 Jun 2022 11:15), Max: 98.3 (02 Jun 2022 15:13)  HR: 62 (03 Jun 2022 11:15) (50 - 74)  BP: 162/77 (03 Jun 2022 11:15) (152/62 - 195/61)  BP(mean): 92 (03 Jun 2022 09:49) (86 - 107)  RR: 18 (03 Jun 2022 11:15) (18 - 19)  SpO2: 98% (03 Jun 2022 11:15) (94% - 100%)    PHYSICAL EXAMINATION:  GENERAL: NAD, obese  HEAD: AT/NC  EYES: conjunctiva and sclera clear  NECK: supple, No JVD noted, Normal thyroid  SKIN: warm dry                          12.4   8.90  )-----------( 192      ( 02 Jun 2022 10:23 )             36.9     06-02    136  |  102  |  21<H>  ----------------------------<  315<H>  3.7   |  27  |  1.23    Ca    8.6      02 Jun 2022 10:23  Mg     1.8     06-02    TPro  7.5  /  Alb  3.3<L>  /  TBili  0.6  /  DBili  x   /  AST  26  /  ALT  29  /  AlkPhos  87  06-02    LIVER FUNCTIONS - ( 02 Jun 2022 10:23 )  Alb: 3.3 g/dL / Pro: 7.5 g/dL / ALK PHOS: 87 U/L / ALT: 29 U/L DA / AST: 26 U/L / GGT: x           CARDIAC MARKERS ( 02 Jun 2022 10:23 )  x     / x     / 85 U/L / x     / x            COVID-19 PCR: NotDetec (02 Jun 2022 12:56)      CAPILLARY BLOOD GLUCOSE      POCT Blood Glucose.: 248 mg/dL (03 Jun 2022 11:32)  POCT Blood Glucose.: 236 mg/dL (02 Jun 2022 21:28)  POCT Blood Glucose.: 256 mg/dL (02 Jun 2022 16:03)      RADIOLOGY & ADDITIONAL TESTS:                  
PGY-1 Progress Note discussed with attending    PAGER #: [[phone]] TILL 5:00 PM  PLEASE CONTACT ON CALL TEAM:  - On Call Team (Please refer to Vicky) FROM 5:00 PM - 8:30PM  - Nightfloat Team FROM 8:30 -7:30 AM    INTERVAL HPI/OVERNIGHT EVENTS:   - Patient denies all symptoms.      REVIEW OF SYSTEMS:  as stated above    MEDICATIONS  (STANDING):  enoxaparin Injectable 40 milliGRAM(s) SubCutaneous every 24 hours  gabapentin 100 milliGRAM(s) Oral three times a day  haloperidol     Tablet 1 milliGRAM(s) Oral at bedtime  insulin glargine Injectable (LANTUS) 15 Unit(s) SubCutaneous at bedtime  insulin lispro (ADMELOG) corrective regimen sliding scale   SubCutaneous three times a day before meals  insulin lispro (ADMELOG) corrective regimen sliding scale   SubCutaneous at bedtime  losartan 100 milliGRAM(s) Oral daily  memantine 5 milliGRAM(s) Oral daily  metFORMIN 1000 milliGRAM(s) Oral two times a day    MEDICATIONS  (PRN):  acetaminophen     Tablet .. 650 milliGRAM(s) Oral every 6 hours PRN Temp greater or equal to 38C (100.4F), Mild Pain (1 - 3)  melatonin 3 milliGRAM(s) Oral at bedtime PRN Insomnia  ondansetron Injectable 4 milliGRAM(s) IV Push every 8 hours PRN Nausea and/or Vomiting      Vital Signs Last 24 Hrs  T(C): 37.1 (04 Jun 2022 10:47), Max: 37.1 (04 Jun 2022 10:47)  T(F): 98.7 (04 Jun 2022 10:47), Max: 98.7 (04 Jun 2022 10:47)  HR: 61 (04 Jun 2022 10:47) (57 - 64)  BP: 159/70 (04 Jun 2022 10:47) (147/71 - 173/61)  BP(mean): 89 (03 Jun 2022 19:34) (89 - 89)  RR: 17 (04 Jun 2022 10:47) (17 - 18)  SpO2: 95% (04 Jun 2022 10:47) (95% - 98%)    PHYSICAL EXAMINATION:  GENERAL: NAD,   HEAD: AT/NC  EYES: conjunctiva and sclera clear  NECK: supple, No JVD noted, Normal thyroid  CHEST/LUNG: CTABL; no rales, rhonchi, wheezing, or rubs  HEART: regular rate and rhythm; no murmurs, rubs, or gallops  ABDOMEN: soft, nontender, nondistended; Bowel sounds present  EXTREMITIES:  2+ Peripheral Pulses, No clubbing, cyanosis, or edema  SKIN: warm dry                    COVID-19 PCR: NotDetec (02 Jun 2022 12:56)      CAPILLARY BLOOD GLUCOSE      POCT Blood Glucose.: 223 mg/dL (04 Jun 2022 11:35)  POCT Blood Glucose.: 229 mg/dL (04 Jun 2022 07:46)  POCT Blood Glucose.: 204 mg/dL (03 Jun 2022 20:50)  POCT Blood Glucose.: 205 mg/dL (03 Jun 2022 17:05)      RADIOLOGY & ADDITIONAL TESTS:

## 2022-06-04 NOTE — DISCHARGE NOTE PROVIDER - NSDCCAREPROVSEEN_GEN_ALL_CORE_FT
Kwame, Beatriz Pandya, Lalita Cox, Rosio Craig, Ishmael Schmidt, Tesha Mcdermott, Shanna Serrato, Sharona Gomes, Sabra Kwame, Beatriz Pandya, Lalita Cox, Rosio Craig, Ishmael Schmidt, Tesha Mcdermott, Shanna Serrato, Sharona Gomes, Sabra Craig, Ishmael QUILES

## 2022-06-04 NOTE — DISCHARGE NOTE PROVIDER - PROVIDER TOKENS
PROVIDER:[TOKEN:[[id number]],FOLLOWUP:[1 week]],PROVIDER:[TOKEN:[[id number]],FOLLOWUP:[1 month]]

## 2022-08-09 PROBLEM — F03.90 UNSPECIFIED DEMENTIA WITHOUT BEHAVIORAL DISTURBANCE: Chronic | Status: ACTIVE | Noted: 2022-06-02

## 2022-08-10 ENCOUNTER — APPOINTMENT (OUTPATIENT)
Dept: NEUROLOGY | Facility: CLINIC | Age: 82
End: 2022-08-10

## 2022-08-16 ENCOUNTER — APPOINTMENT (OUTPATIENT)
Dept: NEUROLOGY | Facility: CLINIC | Age: 82
End: 2022-08-16

## 2022-08-16 VITALS
TEMPERATURE: 96.9 F | SYSTOLIC BLOOD PRESSURE: 145 MMHG | OXYGEN SATURATION: 99 % | WEIGHT: 152 LBS | HEIGHT: 61 IN | DIASTOLIC BLOOD PRESSURE: 67 MMHG | HEART RATE: 58 BPM | BODY MASS INDEX: 28.7 KG/M2

## 2022-08-16 PROCEDURE — 99214 OFFICE O/P EST MOD 30 MIN: CPT

## 2022-09-18 NOTE — HISTORY OF PRESENT ILLNESS
[FreeTextEntry1] : Fell when visiting her sister in [place]. Attributed to quetiapine but she was given two tablets and was allowed to walk.  When in [place] she has not suffered a fall despite using the medications. Also gabapentin has not changed her irritability. \par \par Today presents with daughter for follow-up of dementia symptoms. Daughter states memory loss has been stable, including forgetting words, names. Reports patient gets more confused in evening hours, forgetting who she is or where she is.  States patient has not had any behavioral outbursts. Patient lives with family in the home and nearby and cameras to observe her at home. Patient likes to go on walks with daughter, and denies any falls.

## 2022-09-18 NOTE — DISCUSSION/SUMMARY
[FreeTextEntry1] : Stable /deterioration slightly; continue current medications; no outstanding home needs at this time; follow up after 6 months

## 2022-09-18 NOTE — PHYSICAL EXAM
[FreeTextEntry1] : Cannot remember dates; orofacial dyskinesia; noted for many years and worsen now.  [Person] : oriented to person [Place] : disoriented to place [Time] : disoriented to time [Short Term Intact] : short term memory impaired [Registration Intact] : recent registration memory impaired [Concentration Intact] : normal concentrating ability [Visual Intact] : visual attention was ~T not ~L decreased [Naming Objects] : difficulty naming common objects [Repeating Phrases] : difficulty repeating a phrase [Writing A Sentence] : difficulty writing a sentence [Fluency] : fluency intact [Comprehension] : comprehension intact [Reading] : reading intact [Cranial Nerves Optic (II)] : visual acuity intact bilaterally,  visual fields full to confrontation, pupils equal round and reactive to light [Cranial Nerves Oculomotor (III)] : extraocular motion intact [Cranial Nerves Trigeminal (V)] : facial sensation intact symmetrically [Cranial Nerves Facial (VII)] : face symmetrical [Cranial Nerves Vestibulocochlear (VIII)] : hearing was intact bilaterally [Cranial Nerves Glossopharyngeal (IX)] : tongue and palate midline [Cranial Nerves Accessory (XI - Cranial And Spinal)] : head turning and shoulder shrug symmetric [Cranial Nerves Hypoglossal (XII)] : there was no tongue deviation with protrusion [Motor Strength Upper Extremities Bilaterally] : strength was normal in both upper extremities [Motor Strength Lower Extremities Bilaterally] : strength was normal in both lower extremities [Sensation Tactile Decrease] : light touch was intact [Sensation Pain / Temperature Decrease] : pain and temperature was intact [Limited Balance] : the patient's balance was impaired [Dysdiadochokinesia Bilaterally] : not present [Coordination - Dysmetria Impaired Finger-to-Nose Bilateral] : not present [2+] : Ankle jerk left 2+ [Plantar Reflex Right Only] : normal on the right [Plantar Reflex Left Only] : normal on the left

## 2022-09-25 ENCOUNTER — EMERGENCY (EMERGENCY)
Facility: HOSPITAL | Age: 82
LOS: 1 days | Discharge: HOME | End: 2022-09-25
Attending: EMERGENCY MEDICINE
Payer: COMMERCIAL

## 2022-09-25 VITALS
WEIGHT: 153 LBS | OXYGEN SATURATION: 97 % | HEIGHT: 65 IN | TEMPERATURE: 98 F | RESPIRATION RATE: 16 BRPM | SYSTOLIC BLOOD PRESSURE: 214 MMHG | DIASTOLIC BLOOD PRESSURE: 72 MMHG | HEART RATE: 63 BPM

## 2022-09-25 VITALS
SYSTOLIC BLOOD PRESSURE: 185 MMHG | HEART RATE: 60 BPM | OXYGEN SATURATION: 96 % | RESPIRATION RATE: 16 BRPM | DIASTOLIC BLOOD PRESSURE: 82 MMHG | TEMPERATURE: 98 F

## 2022-09-25 PROCEDURE — 73502 X-RAY EXAM HIP UNI 2-3 VIEWS: CPT

## 2022-09-25 PROCEDURE — 99284 EMERGENCY DEPT VISIT MOD MDM: CPT

## 2022-09-25 PROCEDURE — 99283 EMERGENCY DEPT VISIT LOW MDM: CPT | Mod: 25

## 2022-09-25 PROCEDURE — 73502 X-RAY EXAM HIP UNI 2-3 VIEWS: CPT | Mod: 26,LT
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
## 2022-09-25 NOTE — ED ADULT TRIAGE NOTE - CHIEF COMPLAINT QUOTE
she has a left hip pain for 6 days, she may have a UTI she has a left hip pain for 6 days, she may have a UTI  pt has a difficulty walking due to left hip pain

## 2022-09-25 NOTE — ED PROVIDER NOTE - PHYSICAL EXAMINATION
Right hip: No bony deformities.  no bony deformities, no leg length discrepancy, femoral and pedal pulses intact, cap refill  < 2 secs.

## 2022-09-25 NOTE — ED PROVIDER NOTE - OBJECTIVE STATEMENT
82-year-old female history of dementia presents with daughter.  Daughter states patient complaining of left hip tenderness for past week.  No reported falls noted.  Patient is able to ambulate.  No chest pain, no shortness of breath, no abdominal pain, no reported fever.  Meds; Janumet, glipizide, gabapentin, Aricept.

## 2022-09-25 NOTE — ED PROVIDER NOTE - PATIENT PORTAL LINK FT
You can access the FollowMyHealth Patient Portal offered by [facility] by registering at the following website: [email or web address]. By joining [employer]’s FollowMyHealth portal, you will also be able to view your health information using other applications (apps) compatible with our system.

## 2022-09-25 NOTE — ED ADULT NURSE NOTE - CHIEF COMPLAINT QUOTE
she has a left hip pain for 6 days, she may have a UTI  pt has a difficulty walking due to left hip pain

## 2022-09-25 NOTE — ED PROVIDER NOTE - NSFOLLOWUPINSTRUCTIONS_ED_ALL_ED_FT
Take tyleno l650 every 4 hour for pain as needed.   Hip pain can be caused by a number of conditions, such as bursitis, arthritis, or muscle or tendon strain. You may have swelling in the fluid-filled sacs that protect your muscles and tendons. Hip pain can also be caused by a lower back problem. Hip pain may be caused by trauma, playing sports, or running. Pain may start in your hip and go to your thigh, buttock, or groin.  Hip and Pelvis         DISCHARGE INSTRUCTIONS:    Medicines:   •NSAIDs, such as ibuprofen, help decrease swelling, pain, and fever. This medicine is available with or without a doctor's order. NSAIDs can cause stomach bleeding or kidney problems in certain people. If you take blood thinner medicine, always ask your healthcare provider if NSAIDs are safe for you. Always read the medicine label and follow directions.      •Take your medicine as directed. Contact your healthcare provider if you think your medicine is not helping or if you have side effects. Tell your provider if you are allergic to any medicine. Keep a list of the medicines, vitamins, and herbs you take. Include the amounts, and when and why you take them. Bring the list or the pill bottles to follow-up visits. Carry your medicine list with you in case of an emergency.      Return to the emergency department if:   •Your pain gets worse.      •You have numbness in your leg or toes.      •You cannot put any weight on or move your hip.      Contact your healthcare provider if:   •You have a fever.      •Your pain does not decrease, even after treatment.      •You have questions or concerns about your condition or care.      Follow up with your healthcare provider as directed: You may need physical therapy, an injection, or more testing. You may need to see an orthopedic specialist. Write down your questions so you remember to ask them during your visits.    Manage your hip pain:   •Rest your injured hip so that it can heal. You may need to avoid putting any weight on your hip for at least 48 hours. Return to normal activities as directed.      •Ice the injury for 20 minutes every 4 hours, or as directed. Use an ice pack, or put crushed ice in a plastic bag. Cover it with a towel to protect your skin. Ice helps prevent tissue damage and decreases swelling and pain.      •Elevate your injured hip above the level of your heart as often as you can. This will help decrease swelling and pain. If possible, prop your hip and leg on pillows or blankets to keep the area elevated comfortably.       •Maintain a healthy weight. Extra body weight can cause pressure and pain in your hip, knee, and ankle joints. Ask your healthcare provider how much you should weigh. Ask him or her to help you create a weight loss plan if you are overweight.      •Use assistive devices as directed. You may need to use a cane or crutches. Assistive devices help decrease pain and pressure on your hip when you walk. Ask your healthcare provider for more information about assistive devices and how to use them correctly.

## 2022-09-25 NOTE — ED PROVIDER NOTE - CLINICAL SUMMARY MEDICAL DECISION MAKING FREE TEXT BOX
Pt is in no distress and able to bear wt and ambulate.  Cane provided for stability.  Pt is well appearing, has no new complaints and able to walk with normal gait. Pt is stable for discharge and follow up with medical doctor. Pt educated on care and need for follow up. Discussed anticipatory guidance and return precautions. Questions answered. I had a detailed discussion with the patient and/or guardian regarding the historical points, exam findings, and any diagnostic results supporting the discharge diagnosis..  Daughter will f/u with ortho.    11:40p- /820 daughter will give BP med at home.

## 2022-09-25 NOTE — ED PROVIDER NOTE - NSFOLLOWUPCLINICS_GEN_ALL_ED_FT
[facility]  Orthopedics  [address] [place] [address]  Phone: [phone]  Fax: [phone]

## 2022-11-15 ENCOUNTER — EMERGENCY (EMERGENCY)
Facility: HOSPITAL | Age: 82
LOS: 1 days | Discharge: HOME | End: 2022-11-15
Attending: EMERGENCY MEDICINE
Payer: COMMERCIAL

## 2022-11-15 VITALS
HEART RATE: 62 BPM | DIASTOLIC BLOOD PRESSURE: 82 MMHG | WEIGHT: 154.1 LBS | OXYGEN SATURATION: 98 % | SYSTOLIC BLOOD PRESSURE: 224 MMHG | TEMPERATURE: 99 F | HEIGHT: 65 IN | RESPIRATION RATE: 20 BRPM

## 2022-11-15 LAB
ALBUMIN SERPL ELPH-MCNC: 3.4 G/DL — LOW (ref 3.5–5)
ALP SERPL-CCNC: 84 U/L — SIGNIFICANT CHANGE UP (ref 40–120)
ALT FLD-CCNC: 19 U/L DA — SIGNIFICANT CHANGE UP (ref 10–60)
ANION GAP SERPL CALC-SCNC: 6 MMOL/L — SIGNIFICANT CHANGE UP (ref 5–17)
AST SERPL-CCNC: 16 U/L — SIGNIFICANT CHANGE UP (ref 10–40)
BASOPHILS # BLD AUTO: 0.05 K/UL — SIGNIFICANT CHANGE UP (ref 0–0.2)
BASOPHILS NFR BLD AUTO: 0.6 % — SIGNIFICANT CHANGE UP (ref 0–2)
BILIRUB SERPL-MCNC: 0.3 MG/DL — SIGNIFICANT CHANGE UP (ref 0.2–1.2)
BUN SERPL-MCNC: 19 MG/DL — HIGH (ref 7–18)
CALCIUM SERPL-MCNC: 9 MG/DL — SIGNIFICANT CHANGE UP (ref 8.4–10.5)
CHLORIDE SERPL-SCNC: 105 MMOL/L — SIGNIFICANT CHANGE UP (ref 96–108)
CO2 SERPL-SCNC: 28 MMOL/L — SIGNIFICANT CHANGE UP (ref 22–31)
CREAT SERPL-MCNC: 1.27 MG/DL — SIGNIFICANT CHANGE UP (ref 0.5–1.3)
D DIMER BLD IA.RAPID-MCNC: 495 NG/ML DDU — HIGH
EGFR: 42 ML/MIN/1.73M2 — LOW
EOSINOPHIL # BLD AUTO: 0.37 K/UL — SIGNIFICANT CHANGE UP (ref 0–0.5)
EOSINOPHIL NFR BLD AUTO: 4.4 % — SIGNIFICANT CHANGE UP (ref 0–6)
GLUCOSE SERPL-MCNC: 165 MG/DL — HIGH (ref 70–99)
HCT VFR BLD CALC: 38.5 % — SIGNIFICANT CHANGE UP (ref 34.5–45)
HGB BLD-MCNC: 12.4 G/DL — SIGNIFICANT CHANGE UP (ref 11.5–15.5)
IMM GRANULOCYTES NFR BLD AUTO: 0.4 % — SIGNIFICANT CHANGE UP (ref 0–0.9)
LYMPHOCYTES # BLD AUTO: 2.59 K/UL — SIGNIFICANT CHANGE UP (ref 1–3.3)
LYMPHOCYTES # BLD AUTO: 30.5 % — SIGNIFICANT CHANGE UP (ref 13–44)
MCHC RBC-ENTMCNC: 30.2 PG — SIGNIFICANT CHANGE UP (ref 27–34)
MCHC RBC-ENTMCNC: 32.2 GM/DL — SIGNIFICANT CHANGE UP (ref 32–36)
MCV RBC AUTO: 93.7 FL — SIGNIFICANT CHANGE UP (ref 80–100)
MONOCYTES # BLD AUTO: 0.48 K/UL — SIGNIFICANT CHANGE UP (ref 0–0.9)
MONOCYTES NFR BLD AUTO: 5.7 % — SIGNIFICANT CHANGE UP (ref 2–14)
NEUTROPHILS # BLD AUTO: 4.96 K/UL — SIGNIFICANT CHANGE UP (ref 1.8–7.4)
NEUTROPHILS NFR BLD AUTO: 58.4 % — SIGNIFICANT CHANGE UP (ref 43–77)
NRBC # BLD: 0 /100 WBCS — SIGNIFICANT CHANGE UP (ref 0–0)
NT-PROBNP SERPL-SCNC: 217 PG/ML — SIGNIFICANT CHANGE UP (ref 0–450)
PLATELET # BLD AUTO: 201 K/UL — SIGNIFICANT CHANGE UP (ref 150–400)
POTASSIUM SERPL-MCNC: 4 MMOL/L — SIGNIFICANT CHANGE UP (ref 3.5–5.3)
POTASSIUM SERPL-SCNC: 4 MMOL/L — SIGNIFICANT CHANGE UP (ref 3.5–5.3)
PROT SERPL-MCNC: 7.3 G/DL — SIGNIFICANT CHANGE UP (ref 6–8.3)
RBC # BLD: 4.11 M/UL — SIGNIFICANT CHANGE UP (ref 3.8–5.2)
RBC # FLD: 13.8 % — SIGNIFICANT CHANGE UP (ref 10.3–14.5)
SARS-COV-2 RNA SPEC QL NAA+PROBE: SIGNIFICANT CHANGE UP
SODIUM SERPL-SCNC: 139 MMOL/L — SIGNIFICANT CHANGE UP (ref 135–145)
TROPONIN I, HIGH SENSITIVITY RESULT: 13.2 NG/L — SIGNIFICANT CHANGE UP
WBC # BLD: 8.48 K/UL — SIGNIFICANT CHANGE UP (ref 3.8–10.5)
WBC # FLD AUTO: 8.48 K/UL — SIGNIFICANT CHANGE UP (ref 3.8–10.5)

## 2022-11-15 PROCEDURE — 83880 ASSAY OF NATRIURETIC PEPTIDE: CPT

## 2022-11-15 PROCEDURE — 36415 COLL VENOUS BLD VENIPUNCTURE: CPT

## 2022-11-15 PROCEDURE — 85379 FIBRIN DEGRADATION QUANT: CPT

## 2022-11-15 PROCEDURE — 71046 X-RAY EXAM CHEST 2 VIEWS: CPT

## 2022-11-15 PROCEDURE — 85025 COMPLETE CBC W/AUTO DIFF WBC: CPT

## 2022-11-15 PROCEDURE — 84484 ASSAY OF TROPONIN QUANT: CPT

## 2022-11-15 PROCEDURE — 80053 COMPREHEN METABOLIC PANEL: CPT

## 2022-11-15 PROCEDURE — 87635 SARS-COV-2 COVID-19 AMP PRB: CPT

## 2022-11-15 PROCEDURE — 99285 EMERGENCY DEPT VISIT HI MDM: CPT | Mod: 25

## 2022-11-15 PROCEDURE — 93005 ELECTROCARDIOGRAM TRACING: CPT

## 2022-11-15 PROCEDURE — 71046 X-RAY EXAM CHEST 2 VIEWS: CPT | Mod: 26

## 2022-11-15 PROCEDURE — 99284 EMERGENCY DEPT VISIT MOD MDM: CPT

## 2022-11-15 RX ORDER — NIFEDIPINE 30 MG
30 TABLET, EXTENDED RELEASE 24 HR ORAL ONCE
Refills: 0 | Status: COMPLETED | OUTPATIENT
Start: 2022-11-15 | End: 2022-11-15

## 2022-11-15 RX ADMIN — Medication 30 MILLIGRAM(S): at 22:10

## 2022-11-15 NOTE — ED PROVIDER NOTE - PATIENT PORTAL LINK FT
You can access the FollowMyHealth Patient Portal offered by [facility] by registering at the following website: [email or web address]. By joining [facility] [facility] [facility]’s FollowMyHealth portal, you will also be able to view your health information using other applications (apps) compatible with our system.

## 2022-11-15 NOTE — ED PROVIDER NOTE - CARDIAC, MLM
Normal rate, regular rhythm.  Heart sounds S1, S2.  No murmurs, rubs or gallops. Equal radial pulses.

## 2022-11-15 NOTE — ED PROVIDER NOTE - OBJECTIVE STATEMENT
82 year old female with a PMHx dementia, HTN, HLD, DM, DVT of the right leg, hysterectomy presents to the ED with complaints of acute onset of anterior chest pain and shortness of breath at 6:00 PM today. Patient denies radiation. Also denies nausea, vomiting, fever, significant cough. Reports she is an ex-smoker.

## 2022-11-15 NOTE — ED PROVIDER NOTE - NSFOLLOWUPINSTRUCTIONS_ED_ALL_ED_FT
[facility]  General Internal Medicine  [address] [place] [address]  Phone: [phone]  Fax:     [facility]  Internal Medicine  [address] [place] [address]  Phone: [phone]  Fax: [phone]    [facility]  Cardiology  [address] [place] [address]  Phone: [phone]    Chest Pain  WHAT YOU NEED TO KNOW:  Chest pain can be caused by a range of conditions, from not serious to life-threatening. Chest pain can be a symptom of a digestive problem, such as acid reflux or a stomach ulcer. An anxiety attack or a strong emotion, such as anger, can also cause chest pain. Infection, inflammation, or a fracture in the bones or cartilage in your chest can cause pain or discomfort. Sometimes chest pain or pressure is caused by poor blood flow to your heart (angina). Chest pain may also be caused by life-threatening conditions such as a heart attack or blood clot in your lungs.   DISCHARGE INSTRUCTIONS:  Call 911 if:   •You have any of the following signs of a heart attack: ?Squeezing, pressure, or pain in your chest  ?You may also have any of the following: ?Discomfort or pain in your back, neck, jaw, stomach, or arm  ?Shortness of breath  ?Nausea or vomiting  ?Lightheadedness or a sudden cold sweat  Seek care immediately if:   •You have chest discomfort that gets worse, even with medicine.  •You cough or vomit blood.   •Your bowel movements are black or bloody.   •You cannot stop vomiting, or it hurts to swallow.   Contact your healthcare provider if:   •You have questions or concerns about your condition or care.  Medicines:   •Medicines may be given to treat the cause of your chest pain. Examples include pain medicine, anxiety medicine, or medicines to increase blood flow to your heart.   •Do not take certain medicines without asking your healthcare provider first. These include NSAIDs, herbal or vitamin supplements, or hormones (estrogen or progestin).   •Take your medicine as directed. Contact your healthcare provider if you think your medicine is not helping or if you have side effects. Tell him or her if you are allergic to any medicine. Keep a list of the medicines, vitamins, and herbs you take. Include the amounts, and when and why you take them. Bring the list or the pill bottles to follow-up visits. Carry your medicine list with you in case of an emergency.  Follow up with your healthcare provider within 72 hours, or as directed: You may need to return for more tests to find the cause of your chest pain. You may be referred to a specialist, such as a cardiologist or gastroenterologist. Write down your questions so you remember to ask them during your visits.   Healthy living tips: The following are general healthy guidelines. If your chest pain is caused by a heart problem, your healthcare provider will give you specific guidelines to follow.  •Do not smoke. Nicotine and other chemicals in cigarettes and cigars can cause lung and heart damage. Ask your healthcare provider for information if you currently smoke and need help to quit. E-cigarettes or smokeless tobacco still contain nicotine. Talk to your healthcare provider before you use these products.   •Eat a variety of healthy, low-fat, low-salt foods. Healthy foods include fruits, vegetables, whole-grain breads, low-fat dairy products, beans, lean meats, and fish. Ask for more information about a heart healthy diet.  •Drink plenty of water every day. Your body is made of mostly water. Water helps your body to control temperature and blood pressure. Ask your healthcare provider how much water you should drink every day.  •Ask about activity. Your healthcare provider will tell you which activities to limit or avoid. Ask when you can drive, return to work, and have sex. Ask about the best exercise plan for you.  •Maintain a healthy weight. Ask your healthcare provider how much you should weigh. Ask him or her to help you create a weight loss plan if you are overweight.   If you have a stent:   •Carry your stent card with you at all times.   •Let all healthcare providers know that you have a stent.     [facility] Internal [facility]  General Internal Medicine  [address] [place] [address]  Phone: [phone]  Fax:     [facility]  Internal Medicine  [address] [place] [address]  Phone: [phone]  Fax: [phone]    [facility]  Cardiology  [address] [place] [address]  Phone: [phone]    Chest Pain  WHAT YOU NEED TO KNOW:  Chest pain can be caused by a range of conditions, from not serious to life-threatening. Chest pain can be a symptom of a digestive problem, such as acid reflux or a stomach ulcer. An anxiety attack or a strong emotion, such as anger, can also cause chest pain. Infection, inflammation, or a fracture in the bones or cartilage in your chest can cause pain or discomfort. Sometimes chest pain or pressure is caused by poor blood flow to your heart (angina). Chest pain may also be caused by life-threatening conditions such as a heart attack or blood clot in your lungs.   DISCHARGE INSTRUCTIONS:  Call 911 if:   •You have any of the following signs of a heart attack: ?Squeezing, pressure, or pain in your chest  ?You may also have any of the following: ?Discomfort or pain in your back, neck, jaw, stomach, or arm  ?Shortness of breath  ?Nausea or vomiting  ?Lightheadedness or a sudden cold sweat  Seek care immediately if:   •You have chest discomfort that gets worse, even with medicine.  •You cough or vomit blood.   •Your bowel movements are black or bloody.   •You cannot stop vomiting, or it hurts to swallow.   Contact your healthcare provider if:   •You have questions or concerns about your condition or care.  Medicines:   •Medicines may be given to treat the cause of your chest pain. Examples include pain medicine, anxiety medicine, or medicines to increase blood flow to your heart.   •Do not take certain medicines without asking your healthcare provider first. These include NSAIDs, herbal or vitamin supplements, or hormones (estrogen or progestin).   •Take your medicine as directed. Contact your healthcare provider if you think your medicine is not helping or if you have side effects. Tell him or her if you are allergic to any medicine. Keep a list of the medicines, vitamins, and herbs you take. Include the amounts, and when and why you take them. Bring the list or the pill bottles to follow-up visits. Carry your medicine list with you in case of an emergency.  Follow up with your healthcare provider within 72 hours, or as directed: You may need to return for more tests to find the cause of your chest pain. You may be referred to a specialist, such as a cardiologist or gastroenterologist. Write down your questions so you remember to ask them during your visits.   Healthy living tips: The following are general healthy guidelines. If your chest pain is caused by a heart problem, your healthcare provider will give you specific guidelines to follow.  •Do not smoke. Nicotine and other chemicals in cigarettes and cigars can cause lung and heart damage. Ask your healthcare provider for information if you currently smoke and need help to quit. E-cigarettes or smokeless tobacco still contain nicotine. Talk to your healthcare provider before you use these products.   •Eat a variety of healthy, low-fat, low-salt foods. Healthy foods include fruits, vegetables, whole-grain breads, low-fat dairy products, beans, lean meats, and fish. Ask for more information about a heart healthy diet.  •Drink plenty of water every day. Your body is made of mostly water. Water helps your body to control temperature and blood pressure. Ask your healthcare provider how much water you should drink every day.  •Ask about activity. Your healthcare provider will tell you which activities to limit or avoid. Ask when you can drive, return to work, and have sex. Ask about the best exercise plan for you.  •Maintain a healthy weight. Ask your healthcare provider how much you should weigh. Ask him or her to help you create a weight loss plan if you are overweight.   If you have a stent:   •Carry your stent card with you at all times.   •Let all healthcare providers know that you have a stent.     Dolor de pecho    LO QUE NECESITA SABER:    El dolor en el pecho puede ser provocado por yousif variedad de condiciones, algunas no tan serias y otras que son de peligro mortal. El dolor de pecho también puede ser un síntoma de un problema digestivo, gianfranco la acidez o yousif úlcera estomacal. Un ataque de ansiedad o yousif emoción hola, gianfranco el enojo, también pueden provocar dolor de pecho. Yousif infección, inflamación o fractura en un hueso o cartílago en el pecho podría provocar dolor o molestia. En ocasiones el dolor torácico o la presión en el pecho pueden ser el resultado de magan circulación de la kehinde al corazón (angina). El dolor de pecho también puede ser causado por trastornos potencialmente mortales gianfranco un ataque al corazón o un coágulo de kehinde en los pulmones.    INSTRUCCIONES SOBRE EL [place] HOSPITALARIA:    Llame al número local de emergencias (911 en los [place]) o pídale a alguien que llame si:    Tiene alguno de los siguientes signos de un ataque cardíaco:  Estrujamiento, presión o tensión en tao pecho    Usted también podría presentar alguno de los siguientes:  Malestar o dolor en tao espalda, omar, mandíbula, abdomen, o brazo    Falta de aliento    Náuseas o vómitos    Desvanecimiento o sudor frío repentino    Busque atención médica de inmediato si:    La inflamación en tao pecho empeora, aun con tratamiento.    Usted tose o vomita kehinde.    Priti heces son negras o tienen kehinde.    Usted no puede dejar de vomitar o le duele al tragar.  Llame a tao médico si:    Usted tiene preguntas o inquietudes acerca de tao condición o cuidado.    Medicamentos:    Los medicamentospueden administrarse para tratar la causa del dolor de pecho. Por ejemplo, analgésicos, medicamentos para la ansiedad o medicamentos para aumentar el flujo de kehinde al corazón.    No tome ciertos medicamentos sin antes preguntarle a tao médico.Estos incluyen JACK, suplementos vitamínicos o a base de hierbas u hormonas (estrógeno o progestágeno).    [place] priti medicamentos gianfranco se le haya indicado.Consulte con tao médico si usted kandace que tao medicamento no le está ayudando o si presenta efectos secundarios. Infórmele si es alérgico a cualquier medicamento. Mantenga yousif lista actualizada de los medicamentos, las vitaminas y los productos herbales que cynthia. Incluya los siguientes datos de los medicamentos: cantidad, frecuencia y motivo de administración. Traiga con usted la lista o los envases de las píldoras a priti citas de seguimiento. Lleve la lista de los medicamentos con usted en christy de yousif emergencia.  Consejos para vivir saludable:Los siguientes son consejos generales de brittany. Si se conoce la causa de tao dolor de pecho, tao médico le dará pautas específicas a seguir.    No fume.La nicotina y otros químicos contenidos en los cigarrillos y cigarros pueden causar daño a priti pulmones y el corazón. Pida información a tao médico si usted actualmente fuma y necesita ayuda para dejar de fumar. Los cigarrillos electrónicos o el tabaco sin humo igualmente contienen nicotina. Consulte con tao médico antes de utilizar estos productos.    Elija yousif variedad de alimentos saludables tan a menudo gianfranco sea posible.Incluya frutas y verduras frescas, congeladas o enlatadas. También incluya productos lácteos bajos en grasa, pescado, phi (sin piel) y darrin magras. Tao médico o dietista pueden ayudarlo a crear planes de alimentos. Es posible que tenga que evitar ciertos alimentos o bebidas si el dolor es causado por un problema de digestión.  Alimentos saludables      Reduzca el consumo de sodio (sal).Limite el consumo de alimentos altos en sodio, gianfranco comidas enlatadas, bocadillos salados y embutidos. Si añade christopher cuando cocina la comida, no añada más en la rabago. Elija alimentos enlatados bajos en sodio tanto gianfranco sea posible.        Consuma abundante agua al día.El agua ayuda al cuerpo a controlar la temperatura y la presión arterial. Pregunte a tao médico cuál es la cantidad de agua que debería consumir cada día.    Pregunte acerca de la actividad.Tao médico le dirá cuáles actividades limitar y cuáles evitar. Pregunte cuándo puede manejar, regresar a tao trabajo y tener relaciones sexuales. Pida más información acerca de un plan de ejercicio adecuado para usted.    Mantenga un peso saludable.Pregúntele a tao médico cuál es el peso ideal para usted. Pídale que lo ayude a crear un plan seguro para bajar de peso si tiene sobrepeso.    Pregunte sobre las vacunas que pudiera necesitar.Vacúnese contra la influenza (gripe) todos los años tan pronto gianfranco se recomiende, normalmente en septiembre u octubre. Usted también podría necesitar la vacuna antineumocócica para evitar la neumonía. La vacuna se administra generalmente cada 5 años, a partir de los 65 años. Tao médico puede indicarle si debe recibir otras vacunas, y cuándo aplicárselas.  Programe yousif zarina con tao médico dentro de 72 horas o gianfranco se le indique:Es posible que deba regresar para hacerse más pruebas para encontrar la causa del dolor de pecho. Es probable que lo refieran a un especialista, gianfranco un cardiólogo o un gastroenterólogo. Anote priti preguntas para que se acuerde de hacerlas valentin priti visitas.

## 2022-11-15 NOTE — ED ADULT TRIAGE NOTE - WAS YOUR LAST COVID-19 VACCINE GREATER THAN OR EQUAL TO TWO MONTHS AGO?
Eugenie came in for Nurse visit to obtain BP check.  Manual- 138/78  HR 70  5 minutes later  Machine from home   142/94  HR 65    Patient was started on Valsartan 80 mg daliy.        Please advise any changes in POC    Informed patient to obtain Fasting Labs prior to her next apt on 03/13/17 with Dr Bishop.  She will have them done on Saturday at main clinic.  Shy VILLALTAR to assist in scheduling FLB apt.    FYI:  Patient states she has not had her menstrual period for at least 8 months.  No s/s of menopause.  No night sweats, no mood swings/ changes.      Yes

## 2022-11-15 NOTE — ED PROVIDER NOTE - CONSTITUTIONAL, MLM
Anxious, awake, alert, oriented to person, place, time/situation and in no apparent distress. normal...

## 2022-11-15 NOTE — ED PROVIDER NOTE - PROGRESS NOTE DETAILS
Mancera: Bp improved prior to procardia. normally Bp 150/110 per family.  work up no acute findings, d-dimer neg based on age. cxr no sign of dissection findings. will rpt trop at 1130p. s/o to dr solomon Lucks-DO: pt received at sign-out, seen and evaluated at bedside.  Pt sitting comfortably in NAD. Signout pending repeat troponin. Rpt troponin neg, discussed strict return precautions, pt understood and agreeable with plan.

## 2022-11-16 VITALS
OXYGEN SATURATION: 95 % | RESPIRATION RATE: 18 BRPM | HEART RATE: 56 BPM | SYSTOLIC BLOOD PRESSURE: 141 MMHG | DIASTOLIC BLOOD PRESSURE: 69 MMHG | TEMPERATURE: 98 F

## 2022-11-16 LAB — TROPONIN I, HIGH SENSITIVITY RESULT: 15.2 NG/L — SIGNIFICANT CHANGE UP

## 2022-12-09 NOTE — ED PROVIDER NOTE - CPE EDP NEURO NORM
12/09/22                            Leah Block  [address] [facility] [address]    To Whom It May Concern:    This is to certify Leah Block was evaluated with Niya Pace CNP on 12/09/22 and can return to school on 12/12/2022.     RESTRICTIONS: None            Electronically signed by:  Niya Pace CNP  [facility] [address]  [address] [facility] [address]  Dept Phone: [phone]     
normal...

## 2023-06-12 NOTE — H&P ADULT - ASSESSMENT
75 yo F who comes to ED with son, PMHx of DVT right leg, currently on Coumadin (for one year) HTN, DMT2, RA, diverticulosis s/p colectomy, HLD, GERD c/o shortness of breath and non-productive cough for the past 4-5 days.
done

## 2023-08-04 ENCOUNTER — APPOINTMENT (OUTPATIENT)
Dept: NEUROLOGY | Facility: CLINIC | Age: 83
End: 2023-08-04

## 2023-08-08 NOTE — H&P ADULT - NSHPREVIEWOFSYSTEMS_GEN_ALL_CORE
CONSTITUTIONAL: Fever of 102  EYES/ENT: No visual changes;  No vertigo or throat pain   NECK: No pain or stiffness  RESPIRATORY: Dry cough  CARDIOVASCULAR: No chest pain or palpitations  GASTROINTESTINAL: No abdominal or epigastric pain. No nausea, vomiting, or hematemesis  GENITOURINARY: No dysuria, frequency or hematuria  NEUROLOGICAL: No numbness or weakness  SKIN: No itching, burning, rashes, or lesions   All other review of systems is negative unless indicated above. This is a surgical and/or non-medical patient.

## 2023-10-18 ENCOUNTER — APPOINTMENT (OUTPATIENT)
Dept: NEUROLOGY | Facility: CLINIC | Age: 83
End: 2023-10-18
Payer: MEDICARE

## 2023-10-18 VITALS
HEIGHT: 61 IN | BODY MASS INDEX: 28.89 KG/M2 | SYSTOLIC BLOOD PRESSURE: 166 MMHG | HEART RATE: 59 BPM | WEIGHT: 153 LBS | DIASTOLIC BLOOD PRESSURE: 66 MMHG

## 2023-10-18 PROCEDURE — 99215 OFFICE O/P EST HI 40 MIN: CPT

## 2023-10-18 RX ORDER — VANCOMYCIN HYDROCHLORIDE 125 MG/1
125 CAPSULE ORAL 4 TIMES DAILY
Qty: 56 | Refills: 0 | Status: DISCONTINUED | COMMUNITY
Start: 2021-10-11 | End: 2023-10-18

## 2023-10-18 RX ORDER — PREDNISONE 10 MG/1
10 TABLET ORAL
Qty: 125 | Refills: 0 | Status: DISCONTINUED | COMMUNITY
Start: 2020-11-02 | End: 2023-10-18

## 2023-10-18 RX ORDER — DONEPEZIL HYDROCHLORIDE 10 MG/1
10 TABLET ORAL DAILY
Qty: 90 | Refills: 3 | Status: DISCONTINUED | COMMUNITY
Start: 2020-08-10 | End: 2023-10-18

## 2023-10-18 RX ORDER — MEMANTINE HYDROCHLORIDE 5 MG/1
5 TABLET, FILM COATED ORAL DAILY
Qty: 90 | Refills: 3 | Status: DISCONTINUED | COMMUNITY
Start: 2021-04-30 | End: 2023-10-18

## 2023-10-18 RX ORDER — QUETIAPINE FUMARATE 25 MG/1
25 TABLET ORAL
Qty: 180 | Refills: 3 | Status: DISCONTINUED | COMMUNITY
Start: 2020-11-02 | End: 2023-10-18

## 2023-10-18 RX ORDER — LORAZEPAM 1 MG/1
1 TABLET ORAL
Qty: 4 | Refills: 0 | Status: DISCONTINUED | COMMUNITY
Start: 2020-04-08 | End: 2023-10-18

## 2023-10-18 RX ORDER — MEMANTINE HYDROCHLORIDE 10 MG/1
10 TABLET, FILM COATED ORAL TWICE DAILY
Qty: 180 | Refills: 3 | Status: DISCONTINUED | COMMUNITY
Start: 2021-04-30 | End: 2023-10-18

## 2023-10-18 RX ORDER — MEMANTINE HYDROCHLORIDE 10 MG/1
10 TABLET, FILM COATED ORAL TWICE DAILY
Qty: 180 | Refills: 3 | Status: ACTIVE | COMMUNITY
Start: 2022-08-16

## 2023-10-18 RX ORDER — MEMANTINE HYDROCHLORIDE 5 MG/1
5 TABLET, FILM COATED ORAL
Qty: 60 | Refills: 0 | Status: DISCONTINUED | COMMUNITY
Start: 2022-08-16 | End: 2023-10-18

## 2023-10-18 RX ORDER — ASPIRIN 81 MG/1
81 TABLET, CHEWABLE ORAL DAILY
Qty: 100 | Refills: 3 | Status: DISCONTINUED | COMMUNITY
Start: 2020-08-10 | End: 2023-10-18

## 2023-10-24 ENCOUNTER — TRANSCRIPTION ENCOUNTER (OUTPATIENT)
Age: 83
End: 2023-10-24

## 2023-10-24 ENCOUNTER — NON-APPOINTMENT (OUTPATIENT)
Age: 83
End: 2023-10-24

## 2023-10-31 LAB
ALBUMIN SERPL ELPH-MCNC: 4.5 G/DL
ALP BLD-CCNC: 94 U/L
ALT SERPL-CCNC: 9 U/L
ANION GAP SERPL CALC-SCNC: 14 MMOL/L
AST SERPL-CCNC: 15 U/L
B BURGDOR AB SER-IMP: NEGATIVE
B BURGDOR IGG+IGM SER QL: 0.24 INDEX
BILIRUB SERPL-MCNC: 0.6 MG/DL
BUN SERPL-MCNC: 23 MG/DL
CALCIUM SERPL-MCNC: 10.3 MG/DL
CHLORIDE SERPL-SCNC: 95 MMOL/L
CHOLEST SERPL-MCNC: 181 MG/DL
CO2 SERPL-SCNC: 26 MMOL/L
CREAT SERPL-MCNC: 1.1 MG/DL
EGFR: 49 ML/MIN/1.73M2
FOLATE SERPL-MCNC: 18 NG/ML
GLUCOSE SERPL-MCNC: 432 MG/DL
HCYS SERPL-MCNC: 18.7 UMOL/L
HDLC SERPL-MCNC: 36 MG/DL
INR PPP: 0.95 RATIO
LDLC SERPL CALC-MCNC: 91 MG/DL
METHYLMALONATE SERPL-SCNC: 280 NMOL/L
NONHDLC SERPL-MCNC: 145 MG/DL
POTASSIUM SERPL-SCNC: 4.6 MMOL/L
PROT SERPL-MCNC: 7.4 G/DL
PT BLD: 10.9 SEC
SODIUM SERPL-SCNC: 135 MMOL/L
T PALLIDUM AB SER QL IA: NEGATIVE
T3FREE SERPL-MCNC: 2.8 PG/ML
T4 FREE SERPL-MCNC: 1 NG/DL
TRIGL SERPL-MCNC: 329 MG/DL
TSH SERPL-ACNC: 1.77 UIU/ML
VIT B1 SERPL-MCNC: 101.8 NMOL/L
VIT B12 SERPL-MCNC: 250 PG/ML

## 2023-11-13 ENCOUNTER — RX RENEWAL (OUTPATIENT)
Age: 83
End: 2023-11-13

## 2023-11-15 ENCOUNTER — NON-APPOINTMENT (OUTPATIENT)
Age: 83
End: 2023-11-15

## 2023-12-13 ENCOUNTER — RX RENEWAL (OUTPATIENT)
Age: 83
End: 2023-12-13

## 2024-01-23 ENCOUNTER — NON-APPOINTMENT (OUTPATIENT)
Age: 84
End: 2024-01-23

## 2024-01-23 ENCOUNTER — APPOINTMENT (OUTPATIENT)
Dept: GERIATRICS | Facility: CLINIC | Age: 84
End: 2024-01-23
Payer: MEDICARE

## 2024-01-23 VITALS
SYSTOLIC BLOOD PRESSURE: 193 MMHG | OXYGEN SATURATION: 99 % | DIASTOLIC BLOOD PRESSURE: 75 MMHG | BODY MASS INDEX: 26.81 KG/M2 | HEART RATE: 58 BPM | TEMPERATURE: 97.5 F | WEIGHT: 142 LBS | HEIGHT: 61 IN

## 2024-01-23 DIAGNOSIS — Z78.9 OTHER REDUCED MOBILITY: ICD-10-CM

## 2024-01-23 DIAGNOSIS — Z87.898 PERSONAL HISTORY OF OTHER SPECIFIED CONDITIONS: ICD-10-CM

## 2024-01-23 DIAGNOSIS — K80.50 CALCULUS OF BILE DUCT W/OUT CHOLANGITIS OR CHOLECYSTITIS W/OUT OBSTRUCTION: ICD-10-CM

## 2024-01-23 DIAGNOSIS — R44.1 VISUAL HALLUCINATIONS: ICD-10-CM

## 2024-01-23 DIAGNOSIS — U07.1 COVID-19: ICD-10-CM

## 2024-01-23 DIAGNOSIS — M54.50 LOW BACK PAIN, UNSPECIFIED: ICD-10-CM

## 2024-01-23 DIAGNOSIS — F01.50 VASCULAR DEMENTIA W/OUT BEHAVIORAL DISTURBANCE: ICD-10-CM

## 2024-01-23 DIAGNOSIS — R26.9 UNSPECIFIED ABNORMALITIES OF GAIT AND MOBILITY: ICD-10-CM

## 2024-01-23 DIAGNOSIS — F02.80 PICK'S DISEASE: ICD-10-CM

## 2024-01-23 DIAGNOSIS — Z74.09 OTHER REDUCED MOBILITY: ICD-10-CM

## 2024-01-23 DIAGNOSIS — R13.10 DYSPHAGIA, UNSPECIFIED: ICD-10-CM

## 2024-01-23 DIAGNOSIS — Z01.818 ENCOUNTER FOR OTHER PREPROCEDURAL EXAMINATION: ICD-10-CM

## 2024-01-23 DIAGNOSIS — G31.01 PICK'S DISEASE: ICD-10-CM

## 2024-01-23 DIAGNOSIS — R19.7 DIARRHEA, UNSPECIFIED: ICD-10-CM

## 2024-01-23 PROCEDURE — 93000 ELECTROCARDIOGRAM COMPLETE: CPT | Mod: 59

## 2024-01-23 PROCEDURE — G0008: CPT | Mod: 59

## 2024-01-23 PROCEDURE — 99205 OFFICE O/P NEW HI 60 MIN: CPT | Mod: 25

## 2024-01-23 PROCEDURE — 90662 IIV NO PRSV INCREASED AG IM: CPT | Mod: 59

## 2024-01-23 RX ORDER — GABAPENTIN 100 MG/1
100 CAPSULE ORAL 3 TIMES DAILY
Qty: 540 | Refills: 0 | Status: DISCONTINUED | COMMUNITY
Start: 2021-10-25 | End: 2024-01-23

## 2024-01-23 RX ORDER — ACETAMINOPHEN 500 MG/1
500 TABLET, COATED ORAL EVERY 8 HOURS
Refills: 0 | Status: ACTIVE | COMMUNITY
Start: 2024-01-23

## 2024-01-23 RX ORDER — GLIPIZIDE 2.5 MG/1
2.5 TABLET, FILM COATED, EXTENDED RELEASE ORAL
Refills: 0 | Status: DISCONTINUED | COMMUNITY
Start: 2023-10-18 | End: 2024-01-23

## 2024-01-23 RX ORDER — GLIMEPIRIDE 2 MG/1
2 TABLET ORAL DAILY
Refills: 0 | Status: ACTIVE | COMMUNITY
Start: 2024-01-23

## 2024-01-23 RX ORDER — CHOLESTYRAMINE 4 G/9G
4 POWDER, FOR SUSPENSION ORAL TWICE DAILY
Qty: 60 | Refills: 5 | Status: DISCONTINUED | COMMUNITY
Start: 2021-10-01 | End: 2024-01-23

## 2024-01-23 RX ORDER — CALCIUM CARBONATE 500 MG/1
500 TABLET, CHEWABLE ORAL
Refills: 0 | Status: ACTIVE | COMMUNITY
Start: 2024-01-23

## 2024-01-26 LAB
25(OH)D3 SERPL-MCNC: 17.8 NG/ML
ALBUMIN SERPL ELPH-MCNC: 4.2 G/DL
ALP BLD-CCNC: 101 U/L
ALT SERPL-CCNC: 11 U/L
ANION GAP SERPL CALC-SCNC: 11 MMOL/L
AST SERPL-CCNC: 17 U/L
BASOPHILS # BLD AUTO: 0 K/UL
BASOPHILS NFR BLD AUTO: 0 %
BILIRUB SERPL-MCNC: 0.3 MG/DL
BUN SERPL-MCNC: 14 MG/DL
CALCIUM SERPL-MCNC: 9.6 MG/DL
CHLORIDE SERPL-SCNC: 97 MMOL/L
CHOLEST SERPL-MCNC: 200 MG/DL
CO2 SERPL-SCNC: 27 MMOL/L
CREAT SERPL-MCNC: 0.99 MG/DL
EGFR: 56 ML/MIN/1.73M2
EOSINOPHIL # BLD AUTO: 0.1 K/UL
EOSINOPHIL NFR BLD AUTO: 2.2 %
ESTIMATED AVERAGE GLUCOSE: 306 MG/DL
FOLATE SERPL-MCNC: 19.8 NG/ML
GLUCOSE SERPL-MCNC: 359 MG/DL
HBA1C MFR BLD HPLC: 12.3 %
HCT VFR BLD CALC: 42.1 %
HDLC SERPL-MCNC: 32 MG/DL
HGB BLD-MCNC: 12.8 G/DL
IMM GRANULOCYTES NFR BLD AUTO: 0.2 %
LDLC SERPL CALC-MCNC: 102 MG/DL
LYMPHOCYTES # BLD AUTO: 1.68 K/UL
LYMPHOCYTES NFR BLD AUTO: 37.5 %
MAN DIFF?: NORMAL
MCHC RBC-ENTMCNC: 29.4 PG
MCHC RBC-ENTMCNC: 30.4 GM/DL
MCV RBC AUTO: 96.6 FL
MONOCYTES # BLD AUTO: 0.34 K/UL
MONOCYTES NFR BLD AUTO: 7.6 %
NEUTROPHILS # BLD AUTO: 2.35 K/UL
NEUTROPHILS NFR BLD AUTO: 52.5 %
NONHDLC SERPL-MCNC: 168 MG/DL
PLATELET # BLD AUTO: 231 K/UL
POTASSIUM SERPL-SCNC: 4.6 MMOL/L
PROT SERPL-MCNC: 7.5 G/DL
RBC # BLD: 4.36 M/UL
RBC # FLD: 13.5 %
SODIUM SERPL-SCNC: 136 MMOL/L
TRIGL SERPL-MCNC: 391 MG/DL
VIT B12 SERPL-MCNC: 494 PG/ML
WBC # FLD AUTO: 4.48 K/UL

## 2024-01-26 RX ORDER — BLOOD-GLUCOSE METER
W/DEVICE KIT MISCELLANEOUS
Qty: 1 | Refills: 0 | Status: ACTIVE | COMMUNITY
Start: 2024-01-26 | End: 1900-01-01

## 2024-02-05 ENCOUNTER — RX RENEWAL (OUTPATIENT)
Age: 84
End: 2024-02-05

## 2024-02-05 ENCOUNTER — APPOINTMENT (OUTPATIENT)
Dept: GERIATRICS | Facility: CLINIC | Age: 84
End: 2024-02-05
Payer: MEDICARE

## 2024-02-05 VITALS
DIASTOLIC BLOOD PRESSURE: 80 MMHG | TEMPERATURE: 97.6 F | SYSTOLIC BLOOD PRESSURE: 146 MMHG | BODY MASS INDEX: 26.64 KG/M2 | HEART RATE: 80 BPM | OXYGEN SATURATION: 99 % | WEIGHT: 141 LBS | RESPIRATION RATE: 15 BRPM

## 2024-02-05 DIAGNOSIS — Z71.89 OTHER SPECIFIED COUNSELING: ICD-10-CM

## 2024-02-05 DIAGNOSIS — R63.4 ABNORMAL WEIGHT LOSS: ICD-10-CM

## 2024-02-05 PROCEDURE — 99483 ASSMT & CARE PLN PT COG IMP: CPT

## 2024-02-05 RX ORDER — LANCETS 30 GAUGE
EACH MISCELLANEOUS DAILY
Qty: 1 | Refills: 0 | Status: ACTIVE | COMMUNITY
Start: 2024-02-05 | End: 1900-01-01

## 2024-02-05 RX ORDER — SITAGLIPTIN 50 MG/1
50 TABLET, FILM COATED ORAL
Qty: 90 | Refills: 0 | Status: ACTIVE | COMMUNITY
Start: 2024-02-05 | End: 1900-01-01

## 2024-02-05 RX ORDER — OLANZAPINE 5 MG/1
5 TABLET, FILM COATED ORAL
Qty: 90 | Refills: 1 | Status: ACTIVE | COMMUNITY
Start: 2023-11-28 | End: 1900-01-01

## 2024-02-06 PROBLEM — Z71.89 ACP (ADVANCE CARE PLANNING): Status: ACTIVE | Noted: 2024-01-23

## 2024-02-06 PROBLEM — R63.4 WEIGHT LOSS, UNINTENTIONAL: Status: ACTIVE | Noted: 2024-01-23

## 2024-02-06 LAB
CREAT SPEC-SCNC: 58 MG/DL
MICROALBUMIN 24H UR DL<=1MG/L-MCNC: <1.2 MG/DL
MICROALBUMIN/CREAT 24H UR-RTO: NORMAL MG/G

## 2024-02-06 RX ORDER — ATORVASTATIN CALCIUM 10 MG/1
10 TABLET, FILM COATED ORAL
Qty: 90 | Refills: 1 | Status: ACTIVE | COMMUNITY
Start: 2024-02-06 | End: 1900-01-01

## 2024-02-06 RX ORDER — GLUCOSAMINE HCL/CHONDROITIN SU 500-400 MG
3 CAPSULE ORAL
Qty: 90 | Refills: 3 | Status: ACTIVE | COMMUNITY
Start: 2024-02-06 | End: 1900-01-01

## 2024-02-06 RX ORDER — 70%ISOPROPYL ALCOHOL 0.7 ML/ML
70 SWAB TOPICAL
Qty: 300 | Refills: 0 | Status: DISCONTINUED | COMMUNITY
Start: 2024-02-05 | End: 2024-02-06

## 2024-02-06 RX ORDER — SITAGLIPTIN AND METFORMIN HYDROCHLORIDE 50; 1000 MG/1; MG/1
50-1000 TABLET, FILM COATED ORAL TWICE DAILY
Qty: 120 | Refills: 1 | Status: DISCONTINUED | COMMUNITY
Start: 1900-01-01 | End: 2024-02-06

## 2024-02-06 NOTE — HISTORY OF PRESENT ILLNESS
[Full assistance needed] : Assistance needed managing medications [] : Assistance needed managing finances. [Smoke Detector] : smoke detector [Carbon Monoxide Detector] : carbon monoxide detector [Wears Seat Belt] : wears seat belt [0] : 2) Feeling down, depressed, or hopeless: Not at all (0) [PHQ-2 Negative - No further assessment needed] : PHQ-2 Negative - No further assessment needed [Designated Healthcare Proxy] : Designated healthcare proxy [Two or more falls in past year] : Patient reported two or more falls in the past year [FAST Score: ____] : Functional Assessment Scale (FAST) Score: [unfilled] [FreeTextEntry1] : PMD Dr. Jenkins @ [facility] - wishes to switch primary care   Dx w/ Dementia by neuro Dr. Moss in 2020. Then seen by neuro NP Felipa - was told has advance dementia.  Forgetful, confusion.  Independent before COVID.  Functional decline after got COVID in 2020.   Used to go to senior center before pandemic. Then stopped after got sick with COVID.   - Mood/behavior: Anxiety. Sundowning, VH - kids in the bed, "worries about the kids", thinks that the cats in the home are kids, wandering, agitation, was physically aggressive before started on Olanzapine  - Started on Olanzapine in 11/23 - on 2.5 in AM and 5mg QHS   - Motor Syx: wandering out of home, ambulates unassisted when alone, assisted when family is around.  Rt eye poor vision d/t cataract/eye injury.  Fall in 12/23 - while trying to sit on a chair. Three falls in 2023.  Now has GPS watch w/ phone calling option.   - Sleep: gets up frequently   - Appetite: some days doesn't want to eat, family gives supplement Ensure/Nutriment, sometimes forgets that she ate  Lost 5lbs in past 5 mo  - Toileting:  UI and FI, wears pullups    - Neuro NP Felipa - Seen 10/23 - visit note appreciated in EMR: MMSE 11/30, tapered off gabapentin, rec consider taper Namenda in future   - Brain MRI 04/11/2020: No acute hemorrhage or infarct is identified. Age-appropriate involutional changes and mild microvascular ischemic changes are present.   T2DM   - Problem swallowing Janumet - sometimes refuses to take it  - On Glimepiride - Glucose monitoring system broken -needs new equipment  HTN / HLD  - On statin, Losartan/HCTZ, Nifedipine   [Grab Bars] : no grab bars [Shower Chair] : no shower chair [Driving Concerns] : not driving or driving without noted concerns [[facility]] : >12  [FreeTextEntry8] : UI and FI  [de-identified] : gets confused with phone/remote control  [de-identified] : not driving  [de-identified] : PHQ2 of 0 on 1/23/24  [ESQ6Xylbl] : 0 [AdvancecareDate] : 1/23/24 [FreeTextEntry4] : HCP: ceci Rogel is primary, and denice Mathis is alternate - f/u for further ACP discussion

## 2024-02-06 NOTE — CURRENT MEDS
[] : missed dose(s) of medications. Reason(s): [Yes] : Reviewed medication list for presence of high-risk medications. [Patient/caregiver able to verbalize understanding of medications, including indications and side effects] : Patient/caregiver able to verbalize understanding of medications, including indications and side effects [de-identified] : refuses sometimes

## 2024-02-06 NOTE — REASON FOR VISIT
[Initial Evaluation] : an initial evaluation [Family Member] : family member [FreeTextEntry1] : Dementia  [FreeTextEntry2] : who assists with history due to patient with baseline cognitive impairment [FreeTextEntry3] : dtgildardo Delfina , ceci Rogel

## 2024-02-06 NOTE — SOCIAL HISTORY
[With family] : lives with family [House] : in a house [FreeTextEntry1] : Family provides help via [employer]

## 2024-02-06 NOTE — DATA REVIEWED
[FreeTextEntry1] : see hpi   EXAM:  MR ANGIO BRAIN   EXAM:  MR BRAIN   PROCEDURE DATE:  04/11/2020    INTERPRETATION:  Noncontrast MRI of the brain and MRA of the [place] CLINICAL INDICATION: Vascular disease, dementia TECHNIQUE: Sagittal T1 FLAIR, axial spoiled gradient, gradient echo, T2, and T2 FLAIR, as well as diffusion-weighted MR images of the brain, and 3-D time-of-flight images of the [place] were obtained without the intravenous administration of contrast.  An ADC map was generated.  3-D time-of-flight images were reformatted using MIP protocol targeted over the head.  COMPARISON: CT head dated 12/18/2016 FINDINGS:  Brain MRI 04/11/2020: No acute hemorrhage or infarct is identified. Age-appropriate involutional changes and mild microvascular ischemic changes are present.    No hydrocephalus midline shift or extra-axial collections are identified.  Major flow voids at the skull base are within normal limits.  The orbits are not remarkable in appearance.  The visualized paranasal sinuses and tympanomastoid cavities are free of acute disease.  Brain MRA:  There is mild focal narrowing of the cavernous segment of the left internal carotid artery. The anterior and middle cerebral arteries are patent. Other vertebral basilar confluence and basilar artery are normal. The posterior cerebral arteries are well visualized. The anterior communicating artery is not well visualized.  Impression:  Brain MRI: Age-appropriate involutional changes and mild microvascular ischemic change.  Brain MRA: Mild focal narrowing of the cavernous segment of the left internal carotid artery otherwise unremarkable brain MRA. ESTHELA ROGERS M.D., ATTENDING RADIOLOGIST  This document has been electronically signed. Apr 11 2020 12:56PM

## 2024-02-06 NOTE — PHYSICAL EXAM
[No Clubbing, Cyanosis] : no clubbing or cyanosis of the fingernails [Normal] : normal skin color and pigmentation [Normal Gait] : abnormal gait [Normal Insight/Judgment] : insight and judgment were not intact [FreeTextEntry1] : Rt eye ptosis [de-identified] : gait instability, confused [de-identified] : cooperative, irritable

## 2024-02-06 NOTE — ASSESSMENT
[FreeTextEntry1] : EKG done and reviewed today: Sinus thaddeus @ 57bpm, occasional vent beat, QTC wnl Lab work ordered - f/u results  - discussed r/b/a of influenza vaccine and wishes to get today.  Pt/family to request medical records from PMD including - last visit note - Blood work results - Brain imaging if previously done  - Cognitive assessment test - EKG - Immunization records - Prior screening test results - last mammogram, PAP smear results, colonoscopy, BMD scan  ADC program discussed and reading material provided  Will consider add Melatonin for sleep  CDPAS forms completion requested by family - will complete  Will consult pharmacy team to assist with med review - pt unable to swallow Januvia   Podiatry referral given   - Plan for formal cognitive/mood/behavior assessment at next visit  f/u within 1 mo, unless earlier PRN AWV

## 2024-03-12 ENCOUNTER — APPOINTMENT (OUTPATIENT)
Dept: HOME HEALTH SERVICES | Facility: HOME HEALTH | Age: 84
End: 2024-03-12
Payer: MEDICARE

## 2024-03-12 VITALS
SYSTOLIC BLOOD PRESSURE: 145 MMHG | OXYGEN SATURATION: 90 % | RESPIRATION RATE: 16 BRPM | HEART RATE: 79 BPM | DIASTOLIC BLOOD PRESSURE: 80 MMHG

## 2024-03-12 DIAGNOSIS — Z13.21 ENCOUNTER FOR SCREENING FOR NUTRITIONAL DISORDER: ICD-10-CM

## 2024-03-12 DIAGNOSIS — E78.5 HYPERLIPIDEMIA, UNSPECIFIED: ICD-10-CM

## 2024-03-12 DIAGNOSIS — A04.72 ENTEROCOLITIS DUE TO CLOSTRIDIUM DIFFICILE, NOT SPECIFIED AS RECURRENT: ICD-10-CM

## 2024-03-12 DIAGNOSIS — Z92.29 PERSONAL HISTORY OF OTHER DRUG THERAPY: ICD-10-CM

## 2024-03-12 DIAGNOSIS — E55.9 VITAMIN D DEFICIENCY, UNSPECIFIED: ICD-10-CM

## 2024-03-12 DIAGNOSIS — Z13.820 ENCOUNTER FOR SCREENING FOR OSTEOPOROSIS: ICD-10-CM

## 2024-03-12 DIAGNOSIS — F03.918 UNSPECIFIED DEMENTIA, UNSPECIFIED SEVERITY, WITH OTHER BEHAVIORAL DISTURBANCE: ICD-10-CM

## 2024-03-12 DIAGNOSIS — F99 INSOMNIA DUE TO OTHER MENTAL DISORDER: ICD-10-CM

## 2024-03-12 DIAGNOSIS — K21.9 GASTRO-ESOPHAGEAL REFLUX DISEASE W/OUT ESOPHAGITIS: ICD-10-CM

## 2024-03-12 DIAGNOSIS — Z79.4 TYPE 2 DIABETES MELLITUS WITH OTHER DIABETIC NEUROLOGICAL COMPLICATION: ICD-10-CM

## 2024-03-12 DIAGNOSIS — I10 ESSENTIAL (PRIMARY) HYPERTENSION: ICD-10-CM

## 2024-03-12 DIAGNOSIS — F51.05 INSOMNIA DUE TO OTHER MENTAL DISORDER: ICD-10-CM

## 2024-03-12 DIAGNOSIS — Z12.83 ENCOUNTER FOR SCREENING FOR MALIGNANT NEOPLASM OF SKIN: ICD-10-CM

## 2024-03-12 DIAGNOSIS — E11.49 TYPE 2 DIABETES MELLITUS WITH OTHER DIABETIC NEUROLOGICAL COMPLICATION: ICD-10-CM

## 2024-03-12 PROCEDURE — 99344 HOME/RES VST NEW MOD MDM 60: CPT

## 2024-03-12 RX ORDER — NIFEDIPINE 30 MG/1
30 TABLET, FILM COATED, EXTENDED RELEASE ORAL
Qty: 90 | Refills: 1 | Status: COMPLETED | COMMUNITY
Start: 2024-02-06 | End: 2024-03-12

## 2024-03-12 RX ORDER — LOSARTAN POTASSIUM AND HYDROCHLOROTHIAZIDE 12.5; 5 MG/1; MG/1
50-12.5 TABLET ORAL DAILY
Qty: 90 | Refills: 3 | Status: ACTIVE | COMMUNITY
Start: 2020-06-10 | End: 1900-01-01

## 2024-03-12 RX ORDER — GABAPENTIN 300 MG/1
300 CAPSULE ORAL
Qty: 30 | Refills: 1 | Status: COMPLETED | COMMUNITY
Start: 2024-02-06 | End: 2024-03-12

## 2024-03-12 RX ORDER — COLD-HOT PACK
125 MCG EACH MISCELLANEOUS
Qty: 90 | Refills: 3 | Status: COMPLETED | COMMUNITY
Start: 2024-01-26 | End: 2024-03-12

## 2024-03-12 RX ORDER — METFORMIN HYDROCHLORIDE 1000 MG/1
1000 TABLET, COATED ORAL TWICE DAILY
Qty: 180 | Refills: 1 | Status: COMPLETED | COMMUNITY
Start: 2024-02-05 | End: 2024-03-12

## 2024-03-12 RX ORDER — OLANZAPINE 2.5 MG/1
2.5 TABLET, FILM COATED ORAL
Qty: 90 | Refills: 3 | Status: ACTIVE | COMMUNITY
Start: 2023-10-18

## 2024-03-12 RX ORDER — SITAGLIPTIN AND METFORMIN HYDROCHLORIDE 50; 1000 MG/1; MG/1
50-1000 TABLET, FILM COATED ORAL
Qty: 180 | Refills: 3 | Status: COMPLETED | COMMUNITY
Start: 2024-03-12 | End: 2024-03-12

## 2024-03-12 RX ORDER — CHOLECALCIFEROL (VITAMIN D3) 1250 MCG
1.25 MG CAPSULE ORAL
Qty: 12 | Refills: 0 | Status: ACTIVE | COMMUNITY
Start: 2024-03-12 | End: 1900-01-01

## 2024-03-12 NOTE — PHYSICAL EXAM
[No Acute Distress] : no acute distress [Normal Voice/Communication] : normal voice communication [No JVD] : no jugular venous distention [No Respiratory Distress] : no respiratory distress [Clear to Auscultation] : lungs were clear to auscultation bilaterally [Normal Rate] : heart rate was normal  [Regular Rhythm] : with a regular rhythm [Normal S1, S2] : normal S1 and S2 [Normal Affect] : the affect was normal [No Motor Deficits] : the motor exam was normal [de-identified] : cerumen impaction [de-identified] : no murmur, no edema [de-identified] : unsteady gait

## 2024-03-12 NOTE — REASON FOR VISIT
[Initial Evaluation] : an initial evaluation [Pre-Visit Preparation] : pre-visit preparation was done [Family Member] : family member [FreeTextEntry2] : reviewed chart [FreeTextEntry1] : dementia

## 2024-03-12 NOTE — CURRENT MEDS
[Medication and Allergies Reconciled] : medication and allergies reconciled [Reviewed patient reported medication adherence from Comprehensive Assessment] : Reviewed patient reported medication adherence from comprehensive assessment

## 2024-03-12 NOTE — HISTORY OF PRESENT ILLNESS
[In-Place] : has aide services in-place [FreeTextEntry1] : [employer] [FreeTextEntry2] : 84 yo with dementia, HTN, DM  Dementia- unpredictable appetite. some coughing with swallowing. Wanders, has a locked door to prevent leaving the house. Sometimes gets agitated. on olanzapine 2.5 am/ 5mg at night which are helpful. Incontinent.  Has been losing weight. taking glucerna supplement when she doesn't eat well.  Of note, dtr who is primary cargiver recently had a brain aneurism. Unclear what her level of function she will return to. Son and grandson have been filling in the meantime.  c/o heartburn- takes tums HTN - on losartan hct 50/12.5 DM- janumet stopped 2/2 difficulty swallowing large pills. Does not have rx for januvia. Taking glimepiride 2 mg QD. Last A12=12. Refuses to check BS.    HHA 35 hours/week.

## 2024-03-12 NOTE — COMPREHENSIVE ASSESSMENT
Anesthesia Pre Eval Note    Anesthesia ROS/Med Hx          End/Other Review:    Positive for obesity class I - 30.00 - 34.99      Relevant Problems   Anesthesia Problems   (+) MARCELL on CPAP     Past Medical History:   Diagnosis Date    Arthritis     BILAT KNEE    CAD (coronary artery disease)     Chronic kidney disease     stones    Colon polyp 07/20/2017    Tubular Adenoma, Recall 2022. Dr. Subramanian     Colon polyp 10/05/2022    dr subramanian, 1 hyperplastic polyp, recall 5 years    Diverticulosis of colon 10/05/2022    dr. subramanian    Dyslipidemia     Essential (primary) hypertension     Graves disease 01/01/2004    Heart attack (CMD) 2004    Heart disease     HEART ATTACK AT AGE 32    Hyperprolactinemia (CMD)     Hyperthyroidism     s/p ablation    Hypogonadism in male     Hypothyroid     Internal hemorrhoids 10/05/2022    dr. subramanian    Kidney stone 02/2024    bilateral    MI (myocardial infarction) (CMD) 01/01/2001    Obesity     MARCELL on CPAP     Pituitary adenoma (CMD) 02/11/2014    Pneumonia 2010    Rotator cuff tear 12/05/2013    Sprain of carpometacarpal (joint) of hand 10/16/2012    Wears eyeglasses      Past Surgical History:   Procedure Laterality Date    Cardiac catherization      Carpal tunnel release Bilateral     Colonoscopy w/ polypectomy  07/20/2017    5 yr recall, tubular adenoma / Dr. Subramanian    Colonoscopy w/ polypectomy  10/05/2022    dr subramanian    Cystoscopy,ureteroscopy,lithotripsy Right 02/29/2024    Dr Papo Joseph    Hb cath right heart      Knee arthroscopy w/ meniscal repair Bilateral 01/01/2000    Knee surgery  2005    both knees     Nasal septum surgery      Thyroid ablation      Ureteral stent placement Left 02/29/2024    Dr Papo Joseph    Ureteroscopy Right 11/19/2020    Dr Papo Joseph: RIGHT STENT PLACEMENT         Physical Exam     Airway   Mallampati: II  TM Distance: >3 FB  Neck ROM: Full  Neck: Patient has a beard  TMJ Mobility: Good    Cardiovascular  Cardiovascular exam normal    Dental  Exam  Dental exam normal    Pulmonary Exam  Pulmonary exam normal    Abdominal Exam    Patient Demonstrates:  Obese      Anesthesia Plan:    ASA Status: 3  Anesthesia Type: MAC    Induction: Intravenous  Preferred Airway Type: Mask  Patient does not have a difficult airway or is not at risk of aspiration.   Maintenance: TIVA  Premedication: None    Patient does not have an implantable electronic device requiring post procedure programming.     Post-op Pain Management: Per Surgeon  Postoperative analgesia plan does NOT include opiods    Checklist  Reviewed: Lab Results, Past Med History, Anesthesia Record, NPO Status, Patient Summary, Allergies, EKG, Medications and Problem list  Consent/Risks Discussed Statement:  The proposed anesthetic plan, including its risks and benefits, have been discussed with the Patient along with the risks and benefits of alternatives. Questions were encouraged and answered and the patient and/or representative understands and agrees to proceed.    I have discussed elements of the patient's history or examination, as noted above and/or as follows, that place the patient at higher risk of complications; liver disease.    I discussed with the patient (and/or patient's legal representative) the risks and benefits of the proposed anesthesia plan, MAC, which may include services performed by other anesthesia providers.    Alternative anesthesia plans, if available, were reviewed with the patient (and/or patient's legal representative). Discussion has been held with the patient (and/or patient's legal representative) regarding risks of anesthesia, which include aspiration, conversion to general anesthesia, dental injury, depressed breathing, eye injury, memory loss, intra-operative awareness, vomiting, sore throat, oral injury and nausea and emergent situations that may require change in anesthesia plan.    The patient (and/or patient's legal representative) has indicated understanding, his/her  questions have been answered, and he/she wishes to proceed with the planned anesthetic.    Informed Consent for Blood: Consented  Blood Products: Not Anticipated     [Comprehensive Assessment Reviewed] : Comprehensive assessment reviewed [No Action Needed] : No action needed

## 2024-03-12 NOTE — HEALTH RISK ASSESSMENT
[HRA Reviewed] : Health risk assessment reviewed [Independent] : feeding [Some assistance needed] : transferring [One fall no injury in past year] : Patient reported one fall in the past year without injury [Full assistance needed] : managing finances [TimeGetUpGo] : 15 [No] : The patient does not have visual impairment

## 2024-03-12 NOTE — COUNSELING
[Normal Weight - ( BMI  <25 )] : normal weight - ( BMI  <25 ) [Smoke/CO Detectors] : smoke/CO detectors [Remove clutter and unsafe carpeting to avoid falls] : remove clutter and unsafe carpeting to avoid falls [Non - Smoker] : non-smoker

## 2024-03-21 ENCOUNTER — APPOINTMENT (OUTPATIENT)
Dept: GERIATRICS | Facility: CLINIC | Age: 84
End: 2024-03-21

## 2024-03-25 ENCOUNTER — APPOINTMENT (OUTPATIENT)
Dept: GERIATRICS | Facility: CLINIC | Age: 84
End: 2024-03-25

## 2024-04-02 ENCOUNTER — APPOINTMENT (OUTPATIENT)
Dept: GERIATRICS | Facility: CLINIC | Age: 84
End: 2024-04-02

## 2024-04-05 ENCOUNTER — TRANSCRIPTION ENCOUNTER (OUTPATIENT)
Age: 84
End: 2024-04-05

## 2024-04-05 ENCOUNTER — NON-APPOINTMENT (OUTPATIENT)
Age: 84
End: 2024-04-05

## 2024-04-05 ENCOUNTER — APPOINTMENT (OUTPATIENT)
Dept: HOME HEALTH SERVICES | Facility: HOME HEALTH | Age: 84
End: 2024-04-05
Payer: MEDICARE

## 2024-04-05 DIAGNOSIS — R73.9 HYPERGLYCEMIA, UNSPECIFIED: ICD-10-CM

## 2024-04-05 DIAGNOSIS — R82.90 UNSPECIFIED ABNORMAL FINDINGS IN URINE: ICD-10-CM

## 2024-04-05 PROCEDURE — 99348 HOME/RES VST EST LOW MDM 30: CPT

## 2024-04-05 RX ORDER — BLOOD-GLUCOSE METER
W/DEVICE KIT MISCELLANEOUS
Qty: 1 | Refills: 0 | Status: ACTIVE | COMMUNITY
Start: 2024-04-05 | End: 1900-01-01

## 2024-04-05 RX ORDER — METFORMIN HYDROCHLORIDE 500 MG/1
500 TABLET, COATED ORAL
Qty: 360 | Refills: 3 | Status: ACTIVE | COMMUNITY
Start: 2024-04-05 | End: 1900-01-01

## 2024-04-05 RX ORDER — AMOXICILLIN 400 MG/5ML
400 FOR SUSPENSION ORAL
Qty: 1 | Refills: 0 | Status: ACTIVE | COMMUNITY
Start: 2024-04-05 | End: 1900-01-01

## 2024-04-05 RX ORDER — LANCETS 33 GAUGE
EACH MISCELLANEOUS
Qty: 120 | Refills: 11 | Status: ACTIVE | COMMUNITY
Start: 2024-04-05 | End: 1900-01-01

## 2024-04-06 ENCOUNTER — INPATIENT (INPATIENT)
Facility: HOSPITAL | Age: 84
LOS: 4 days | Discharge: NURSING, REHAB OR LONG-TERM CARE | End: 2024-04-11
Attending: STUDENT IN AN ORGANIZED HEALTH CARE EDUCATION/TRAINING PROGRAM | Admitting: STUDENT IN AN ORGANIZED HEALTH CARE EDUCATION/TRAINING PROGRAM
Payer: MEDICARE

## 2024-04-06 ENCOUNTER — TRANSCRIPTION ENCOUNTER (OUTPATIENT)
Age: 84
End: 2024-04-06

## 2024-04-06 VITALS
SYSTOLIC BLOOD PRESSURE: 182 MMHG | OXYGEN SATURATION: 98 % | HEIGHT: 63 IN | RESPIRATION RATE: 16 BRPM | DIASTOLIC BLOOD PRESSURE: 96 MMHG | TEMPERATURE: 98 F | WEIGHT: 153 LBS | HEART RATE: 86 BPM

## 2024-04-06 DIAGNOSIS — R29.6 REPEATED FALLS: ICD-10-CM

## 2024-04-06 DIAGNOSIS — E87.20 ACIDOSIS, UNSPECIFIED: ICD-10-CM

## 2024-04-06 DIAGNOSIS — F03.90 UNSPECIFIED DEMENTIA, UNSPECIFIED SEVERITY, WITHOUT BEHAVIORAL DISTURBANCE, PSYCHOTIC DISTURBANCE, MOOD DISTURBANCE, AND ANXIETY: ICD-10-CM

## 2024-04-06 DIAGNOSIS — E83.42 HYPOMAGNESEMIA: ICD-10-CM

## 2024-04-06 DIAGNOSIS — N39.0 URINARY TRACT INFECTION, SITE NOT SPECIFIED: ICD-10-CM

## 2024-04-06 DIAGNOSIS — E04.9 NONTOXIC GOITER, UNSPECIFIED: ICD-10-CM

## 2024-04-06 DIAGNOSIS — E78.5 HYPERLIPIDEMIA, UNSPECIFIED: ICD-10-CM

## 2024-04-06 DIAGNOSIS — I63.9 CEREBRAL INFARCTION, UNSPECIFIED: ICD-10-CM

## 2024-04-06 DIAGNOSIS — I10 ESSENTIAL (PRIMARY) HYPERTENSION: ICD-10-CM

## 2024-04-06 DIAGNOSIS — E11.65 TYPE 2 DIABETES MELLITUS WITH HYPERGLYCEMIA: ICD-10-CM

## 2024-04-06 LAB
ALBUMIN SERPL ELPH-MCNC: 3.8 G/DL — SIGNIFICANT CHANGE UP (ref 3.3–5)
ALP SERPL-CCNC: 128 U/L — HIGH (ref 40–120)
ALT FLD-CCNC: 24 U/L — SIGNIFICANT CHANGE UP (ref 12–78)
ANION GAP SERPL CALC-SCNC: 7 MMOL/L — SIGNIFICANT CHANGE UP (ref 5–17)
APPEARANCE UR: CLEAR — SIGNIFICANT CHANGE UP
APTT BLD: 32.1 SEC — SIGNIFICANT CHANGE UP (ref 24.5–35.6)
AST SERPL-CCNC: 23 U/L — SIGNIFICANT CHANGE UP (ref 15–37)
BACTERIA # UR AUTO: ABNORMAL /HPF
BASOPHILS # BLD AUTO: 0.01 K/UL — SIGNIFICANT CHANGE UP (ref 0–0.2)
BASOPHILS NFR BLD AUTO: 0.1 % — SIGNIFICANT CHANGE UP (ref 0–2)
BILIRUB SERPL-MCNC: 0.7 MG/DL — SIGNIFICANT CHANGE UP (ref 0.2–1.2)
BILIRUB UR-MCNC: NEGATIVE — SIGNIFICANT CHANGE UP
BLD GP AB SCN SERPL QL: SIGNIFICANT CHANGE UP
BUN SERPL-MCNC: 17 MG/DL — SIGNIFICANT CHANGE UP (ref 7–23)
CALCIUM SERPL-MCNC: 9.4 MG/DL — SIGNIFICANT CHANGE UP (ref 8.5–10.1)
CHLORIDE SERPL-SCNC: 101 MMOL/L — SIGNIFICANT CHANGE UP (ref 96–108)
CO2 SERPL-SCNC: 28 MMOL/L — SIGNIFICANT CHANGE UP (ref 22–31)
COLOR SPEC: YELLOW — SIGNIFICANT CHANGE UP
CREAT SERPL-MCNC: 1.18 MG/DL — SIGNIFICANT CHANGE UP (ref 0.5–1.3)
DIFF PNL FLD: NEGATIVE — SIGNIFICANT CHANGE UP
EGFR: 45 ML/MIN/1.73M2 — LOW
EOSINOPHIL # BLD AUTO: 0 K/UL — SIGNIFICANT CHANGE UP (ref 0–0.5)
EOSINOPHIL NFR BLD AUTO: 0 % — SIGNIFICANT CHANGE UP (ref 0–6)
GLUCOSE BLDC GLUCOMTR-MCNC: 268 MG/DL — HIGH (ref 70–99)
GLUCOSE BLDC GLUCOMTR-MCNC: 335 MG/DL — HIGH (ref 70–99)
GLUCOSE SERPL-MCNC: 343 MG/DL — HIGH (ref 70–99)
GLUCOSE UR QL: 250 MG/DL
HCT VFR BLD CALC: 38.1 % — SIGNIFICANT CHANGE UP (ref 34.5–45)
HGB BLD-MCNC: 12.2 G/DL — SIGNIFICANT CHANGE UP (ref 11.5–15.5)
IMM GRANULOCYTES NFR BLD AUTO: 0.3 % — SIGNIFICANT CHANGE UP (ref 0–0.9)
INR BLD: 0.88 RATIO — SIGNIFICANT CHANGE UP (ref 0.85–1.18)
KETONES UR-MCNC: NEGATIVE MG/DL — SIGNIFICANT CHANGE UP
LACTATE SERPL-SCNC: 2.4 MMOL/L — HIGH (ref 0.7–2)
LACTATE SERPL-SCNC: 2.4 MMOL/L — HIGH (ref 0.7–2)
LACTATE SERPL-SCNC: 2.7 MMOL/L — HIGH (ref 0.7–2)
LEUKOCYTE ESTERASE UR-ACNC: ABNORMAL
LYMPHOCYTES # BLD AUTO: 2.34 K/UL — SIGNIFICANT CHANGE UP (ref 1–3.3)
LYMPHOCYTES # BLD AUTO: 30.3 % — SIGNIFICANT CHANGE UP (ref 13–44)
MAGNESIUM SERPL-MCNC: 1.5 MG/DL — LOW (ref 1.6–2.6)
MCHC RBC-ENTMCNC: 28.6 PG — SIGNIFICANT CHANGE UP (ref 27–34)
MCHC RBC-ENTMCNC: 32 G/DL — SIGNIFICANT CHANGE UP (ref 32–36)
MCV RBC AUTO: 89.4 FL — SIGNIFICANT CHANGE UP (ref 80–100)
MONOCYTES # BLD AUTO: 0.58 K/UL — SIGNIFICANT CHANGE UP (ref 0–0.9)
MONOCYTES NFR BLD AUTO: 7.5 % — SIGNIFICANT CHANGE UP (ref 2–14)
NEUTROPHILS # BLD AUTO: 4.78 K/UL — SIGNIFICANT CHANGE UP (ref 1.8–7.4)
NEUTROPHILS NFR BLD AUTO: 61.8 % — SIGNIFICANT CHANGE UP (ref 43–77)
NITRITE UR-MCNC: POSITIVE
NRBC # BLD: 0 /100 WBCS — SIGNIFICANT CHANGE UP (ref 0–0)
PH UR: 7.5 — SIGNIFICANT CHANGE UP (ref 5–8)
PLATELET # BLD AUTO: 190 K/UL — SIGNIFICANT CHANGE UP (ref 150–400)
POTASSIUM SERPL-MCNC: 4.3 MMOL/L — SIGNIFICANT CHANGE UP (ref 3.5–5.3)
POTASSIUM SERPL-SCNC: 4.3 MMOL/L — SIGNIFICANT CHANGE UP (ref 3.5–5.3)
PROT SERPL-MCNC: 8.3 GM/DL — SIGNIFICANT CHANGE UP (ref 6–8.3)
PROT UR-MCNC: NEGATIVE MG/DL — SIGNIFICANT CHANGE UP
PROTHROM AB SERPL-ACNC: 10.5 SEC — SIGNIFICANT CHANGE UP (ref 9.5–13)
RBC # BLD: 4.26 M/UL — SIGNIFICANT CHANGE UP (ref 3.8–5.2)
RBC # FLD: 14.6 % — HIGH (ref 10.3–14.5)
RBC CASTS # UR COMP ASSIST: 2 /HPF — SIGNIFICANT CHANGE UP (ref 0–4)
SODIUM SERPL-SCNC: 136 MMOL/L — SIGNIFICANT CHANGE UP (ref 135–145)
SP GR SPEC: >1.03 — HIGH (ref 1–1.03)
TROPONIN I, HIGH SENSITIVITY RESULT: 17.5 NG/L — SIGNIFICANT CHANGE UP
UROBILINOGEN FLD QL: 1 MG/DL — SIGNIFICANT CHANGE UP (ref 0.2–1)
WBC # BLD: 7.73 K/UL — SIGNIFICANT CHANGE UP (ref 3.8–10.5)
WBC # FLD AUTO: 7.73 K/UL — SIGNIFICANT CHANGE UP (ref 3.8–10.5)
WBC UR QL: 10 /HPF — HIGH (ref 0–5)

## 2024-04-06 PROCEDURE — 99285 EMERGENCY DEPT VISIT HI MDM: CPT

## 2024-04-06 PROCEDURE — 70496 CT ANGIOGRAPHY HEAD: CPT | Mod: 26,MC

## 2024-04-06 PROCEDURE — 70486 CT MAXILLOFACIAL W/O DYE: CPT | Mod: 26,MC

## 2024-04-06 PROCEDURE — 70498 CT ANGIOGRAPHY NECK: CPT | Mod: 26,MC

## 2024-04-06 PROCEDURE — 71045 X-RAY EXAM CHEST 1 VIEW: CPT | Mod: 26

## 2024-04-06 PROCEDURE — 70450 CT HEAD/BRAIN W/O DYE: CPT | Mod: 26,XU,MC

## 2024-04-06 PROCEDURE — 99223 1ST HOSP IP/OBS HIGH 75: CPT

## 2024-04-06 PROCEDURE — 73502 X-RAY EXAM HIP UNI 2-3 VIEWS: CPT | Mod: 26,RT

## 2024-04-06 PROCEDURE — 93010 ELECTROCARDIOGRAM REPORT: CPT

## 2024-04-06 RX ORDER — SODIUM CHLORIDE 9 MG/ML
500 INJECTION INTRAMUSCULAR; INTRAVENOUS; SUBCUTANEOUS ONCE
Refills: 0 | Status: COMPLETED | OUTPATIENT
Start: 2024-04-06 | End: 2024-04-06

## 2024-04-06 RX ORDER — CEFTRIAXONE 500 MG/1
1000 INJECTION, POWDER, FOR SOLUTION INTRAMUSCULAR; INTRAVENOUS EVERY 24 HOURS
Refills: 0 | Status: COMPLETED | OUTPATIENT
Start: 2024-04-06 | End: 2024-04-08

## 2024-04-06 RX ORDER — ATORVASTATIN CALCIUM 80 MG/1
40 TABLET, FILM COATED ORAL AT BEDTIME
Refills: 0 | Status: DISCONTINUED | OUTPATIENT
Start: 2024-04-06 | End: 2024-04-11

## 2024-04-06 RX ORDER — SODIUM CHLORIDE 9 MG/ML
1000 INJECTION, SOLUTION INTRAVENOUS
Refills: 0 | Status: DISCONTINUED | OUTPATIENT
Start: 2024-04-06 | End: 2024-04-11

## 2024-04-06 RX ORDER — GABAPENTIN 400 MG/1
2 CAPSULE ORAL
Qty: 0 | Refills: 0 | DISCHARGE

## 2024-04-06 RX ORDER — GLIMEPIRIDE 1 MG
1 TABLET ORAL
Qty: 0 | Refills: 0 | DISCHARGE

## 2024-04-06 RX ORDER — DEXTROSE 50 % IN WATER 50 %
12.5 SYRINGE (ML) INTRAVENOUS ONCE
Refills: 0 | Status: DISCONTINUED | OUTPATIENT
Start: 2024-04-06 | End: 2024-04-11

## 2024-04-06 RX ORDER — INSULIN HUMAN 100 [IU]/ML
5 INJECTION, SOLUTION SUBCUTANEOUS ONCE
Refills: 0 | Status: COMPLETED | OUTPATIENT
Start: 2024-04-06 | End: 2024-04-06

## 2024-04-06 RX ORDER — DEXTROSE 50 % IN WATER 50 %
25 SYRINGE (ML) INTRAVENOUS ONCE
Refills: 0 | Status: DISCONTINUED | OUTPATIENT
Start: 2024-04-06 | End: 2024-04-11

## 2024-04-06 RX ORDER — OLANZAPINE 15 MG/1
5 TABLET, FILM COATED ORAL AT BEDTIME
Refills: 0 | Status: DISCONTINUED | OUTPATIENT
Start: 2024-04-06 | End: 2024-04-09

## 2024-04-06 RX ORDER — INSULIN LISPRO 100/ML
VIAL (ML) SUBCUTANEOUS
Refills: 0 | Status: DISCONTINUED | OUTPATIENT
Start: 2024-04-06 | End: 2024-04-11

## 2024-04-06 RX ORDER — MEMANTINE HYDROCHLORIDE 10 MG/1
1 TABLET ORAL
Qty: 0 | Refills: 0 | DISCHARGE

## 2024-04-06 RX ORDER — INSULIN LISPRO 100/ML
VIAL (ML) SUBCUTANEOUS AT BEDTIME
Refills: 0 | Status: DISCONTINUED | OUTPATIENT
Start: 2024-04-06 | End: 2024-04-11

## 2024-04-06 RX ORDER — DEXTROSE 10 % IN WATER 10 %
125 INTRAVENOUS SOLUTION INTRAVENOUS ONCE
Refills: 0 | Status: DISCONTINUED | OUTPATIENT
Start: 2024-04-06 | End: 2024-04-11

## 2024-04-06 RX ORDER — GLUCAGON INJECTION, SOLUTION 0.5 MG/.1ML
1 INJECTION, SOLUTION SUBCUTANEOUS ONCE
Refills: 0 | Status: DISCONTINUED | OUTPATIENT
Start: 2024-04-06 | End: 2024-04-11

## 2024-04-06 RX ORDER — ASPIRIN/CALCIUM CARB/MAGNESIUM 324 MG
324 TABLET ORAL ONCE
Refills: 0 | Status: COMPLETED | OUTPATIENT
Start: 2024-04-06 | End: 2024-04-06

## 2024-04-06 RX ORDER — NIFEDIPINE 30 MG
1 TABLET, EXTENDED RELEASE 24 HR ORAL
Refills: 0 | DISCHARGE

## 2024-04-06 RX ORDER — MAGNESIUM SULFATE 500 MG/ML
2 VIAL (ML) INJECTION ONCE
Refills: 0 | Status: COMPLETED | OUTPATIENT
Start: 2024-04-06 | End: 2024-04-06

## 2024-04-06 RX ORDER — ACETAMINOPHEN 500 MG
650 TABLET ORAL EVERY 6 HOURS
Refills: 0 | Status: DISCONTINUED | OUTPATIENT
Start: 2024-04-06 | End: 2024-04-11

## 2024-04-06 RX ORDER — GABAPENTIN 400 MG/1
300 CAPSULE ORAL AT BEDTIME
Refills: 0 | Status: DISCONTINUED | OUTPATIENT
Start: 2024-04-06 | End: 2024-04-11

## 2024-04-06 RX ORDER — ASPIRIN/CALCIUM CARB/MAGNESIUM 324 MG
81 TABLET ORAL DAILY
Refills: 0 | Status: DISCONTINUED | OUTPATIENT
Start: 2024-04-07 | End: 2024-04-11

## 2024-04-06 RX ORDER — ONDANSETRON 8 MG/1
4 TABLET, FILM COATED ORAL EVERY 8 HOURS
Refills: 0 | Status: DISCONTINUED | OUTPATIENT
Start: 2024-04-06 | End: 2024-04-11

## 2024-04-06 RX ORDER — OLANZAPINE 15 MG/1
2.5 TABLET, FILM COATED ORAL DAILY
Refills: 0 | Status: DISCONTINUED | OUTPATIENT
Start: 2024-04-07 | End: 2024-04-11

## 2024-04-06 RX ORDER — DEXTROSE 50 % IN WATER 50 %
15 SYRINGE (ML) INTRAVENOUS ONCE
Refills: 0 | Status: DISCONTINUED | OUTPATIENT
Start: 2024-04-06 | End: 2024-04-11

## 2024-04-06 RX ORDER — LANOLIN ALCOHOL/MO/W.PET/CERES
3 CREAM (GRAM) TOPICAL AT BEDTIME
Refills: 0 | Status: DISCONTINUED | OUTPATIENT
Start: 2024-04-06 | End: 2024-04-11

## 2024-04-06 RX ORDER — SODIUM CHLORIDE 9 MG/ML
500 INJECTION INTRAMUSCULAR; INTRAVENOUS; SUBCUTANEOUS ONCE
Refills: 0 | Status: DISCONTINUED | OUTPATIENT
Start: 2024-04-06 | End: 2024-04-11

## 2024-04-06 RX ADMIN — Medication 25 GRAM(S): at 19:50

## 2024-04-06 RX ADMIN — CEFTRIAXONE 100 MILLIGRAM(S): 500 INJECTION, POWDER, FOR SOLUTION INTRAMUSCULAR; INTRAVENOUS at 21:50

## 2024-04-06 RX ADMIN — Medication 4: at 21:54

## 2024-04-06 RX ADMIN — INSULIN HUMAN 5 UNIT(S): 100 INJECTION, SOLUTION SUBCUTANEOUS at 19:50

## 2024-04-06 RX ADMIN — ATORVASTATIN CALCIUM 40 MILLIGRAM(S): 80 TABLET, FILM COATED ORAL at 19:57

## 2024-04-06 RX ADMIN — GABAPENTIN 300 MILLIGRAM(S): 400 CAPSULE ORAL at 23:19

## 2024-04-06 RX ADMIN — Medication 2: at 22:58

## 2024-04-06 RX ADMIN — SODIUM CHLORIDE 500 MILLILITER(S): 9 INJECTION INTRAMUSCULAR; INTRAVENOUS; SUBCUTANEOUS at 22:03

## 2024-04-06 RX ADMIN — OLANZAPINE 5 MILLIGRAM(S): 15 TABLET, FILM COATED ORAL at 23:28

## 2024-04-06 RX ADMIN — SODIUM CHLORIDE 500 MILLILITER(S): 9 INJECTION INTRAMUSCULAR; INTRAVENOUS; SUBCUTANEOUS at 18:12

## 2024-04-06 RX ADMIN — Medication 324 MILLIGRAM(S): at 18:01

## 2024-04-06 RX ADMIN — Medication 3 MILLIGRAM(S): at 23:19

## 2024-04-06 NOTE — ED PROVIDER NOTE - PHYSICAL EXAMINATION
General: Well appearing female in no acute distress  HEENT: Normocephalic, atraumatic. Moist mucous membranes. Oropharynx clear. No lymphadenopathy.  Eyes: No scleral icterus. EOMI. RAHEEL.  Neck:. Soft and supple. Full ROM without pain. No midline tenderness  Cardiac: Regular rate and regular rhythm. No murmurs, rubs, gallops. Peripheral pulses 2+ and symmetric. No LE edema.  Resp: Lungs CTAB. Speaking in full sentences. No wheezes, rales or rhonchi.  Abd: Soft, non-tender, non-distended. No guarding or rebound. No scars, masses, or lesions.  Back: Spine midline and non-tender. No CVA tenderness.    Skin: No rashes, abrasions, or lacerations.  Neuro: AO x 1. Moves all extremities symmetrically. Motor strength and sensation grossly intact.

## 2024-04-06 NOTE — ED PROVIDER NOTE - OBJECTIVE STATEMENT
83-year-old female with history of hypertension, diabetes, TIA, glaucoma of the right eye, dementia presents with right-sided weakness for the past 1 day.  Last known well was 1 PM 1 day ago per daughter at the bedside.  States she was acting normal for him.  Endorsing multiple episodes of falls over the past 1 day.  Daughter also states she was diagnosed with a UTI 1 day ago.  Normally ambulates with no equipment..  Facial trauma to the right side of the face on arrival.  Not on blood thinners.

## 2024-04-06 NOTE — ED ADULT NURSE NOTE - DRUG PRE-SCREENING (DAST -1)
OBESITY     Obesity is defined as a body mass index (BMI) which is greater than 30  Your Body mass index is 30 47 kg/m²       The risks of obesity include  many health problems, such as injuries or physical disability  You may need tests to check for the following:  · Diabetes     · High blood pressure or high cholesterol     · Heart disease     · Gallbladder or liver disease     · Cancer of the colon, breast, prostate, liver, or kidney     · Sleep apnea     · Arthritis or gout    Seek care immediately if:   · You have a severe headache, confusion, or difficulty speaking  · You have weakness on one side of your body  · You have chest pain, sweating, or shortness of breath  Contact your healthcare provider if:   · You have symptoms of gallbladder or liver disease, such as pain in your upper abdomen  · You have knee or hip pain and discomfort while walking  · You have symptoms of diabetes, such as intense hunger and thirst, and frequent urination  · You have symptoms of sleep apnea, such as snoring or daytime sleepiness  · You have questions or concerns about your condition or care  Treatment for obesity  focuses on helping you lose weight to improve your health  Even a small decrease in BMI can reduce the risk for many health problems  Your healthcare provider will help you set a weight-loss goal   · Lifestyle changes  are the first step in treating obesity  These include making healthy food choices and getting regular physical activity  Your healthcare provider may suggest a weight-loss program that involves coaching, education, and therapy  · Medicine  may help you lose weight when it is used with a healthy diet and physical activity  · Surgery  can help you lose weight if you are very obese and have other health problems  There are several types of weight-loss surgery  Ask your healthcare provider for more information      Be successful losing weight:   · Set small, realistic goals  An example of a small goal is to walk for 20 minutes 5 days a week  Zeina goal is to lose 5% of your body weight  · Tell friends, family members, and coworkers about your goals  and ask for their support  Ask a friend to lose weight with you, or join a weight-loss support group  · Identify foods or triggers that may cause you to overeat , and find ways to avoid them  Remove tempting high-calorie foods from your home and workplace  Place a bowl of fresh fruit on your kitchen counter  If stress causes you to eat, then find other ways to cope with stress  · Keep a diary to track what you eat and drink  Also write down how many minutes of physical activity you do each day  Weigh yourself once a week and record it in your diary  Eating changes: You will need to eat 500 to 1,000 fewer calories each day than you currently eat to lose 1 to 2 pounds a week  The following changes will help you cut calories:  · Eat smaller portions  Use small plates, no larger than 9 inches in diameter  Fill your plate half full of fruits and vegetables  Measure your food using measuring cups until you know what a serving size looks like  · Eat 3 meals and 1 or 2 snacks each day  Plan your meals in advance  [place] and eat at home most of the time  Eat slowly  · Eat fruits and vegetables at every meal   They are low in calories and high in fiber, which makes you feel full  Do not add butter, margarine, or cream sauce to vegetables  Use herbs to season steamed vegetables  · Eat less fat and fewer fried foods  Eat more baked or grilled chicken and fish  These protein sources are lower in calories and fat than red meat  Limit fast food  Dress your salads with olive oil and vinegar instead of bottled dressing  · Limit the amount of sugar you eat  Do not drink sugary beverages  Limit alcohol  Activity changes:  Physical activity is good for your body in many ways   It helps you burn calories and build strong muscles  It decreases stress and depression, and improves your mood  It can also help you sleep better  Talk to your healthcare provider before you begin an exercise program   · Exercise for at least 30 minutes 5 days a week  Start slowly  Set aside time each day for physical activity that you enjoy and that is convenient for you  It is best to do both weight training and an activity that increases your heart rate, such as walking, bicycling, or swimming  · Find ways to be more active  Do yard work and housecleaning  Walk up the stairs instead of using elevators  Spend your leisure time going to events that require walking, such as outdoor festivals or fairs  This extra physical activity can help you lose weight and keep it off  Follow up with your primary healthcare provider as directed  You may need to meet with a dietitian  Write down your questions so you remember to ask them during your visits  Statement Selected

## 2024-04-06 NOTE — H&P ADULT - HISTORY OF PRESENT ILLNESS
Estella Eckert is an 83 year old female with PMHx of HTN, HLD, NIDDM2, R. eye glaucoma, and dementia who presented to the ED on 4/6/24 for complaints of right sided weakness.    History primarily obtained from daughter at bedside as patient is unable to provide history due to dementia. Patient reports she is going to the [employer] so she cannot stay in the hospital. As per daughter at bedside, patient has had five falls at home since yesterday. Around 1:30 PM yesterday, daughter was about to leave patient's house when she noticed her having slurred speech while eating lunch. She could not hold her fork in her right hand and she noticed patient holding onto right arm with her left hand. Daughter left for five minutes and then returned. States speech was resolved and patient was no longer holding onto her right arm so she assumed symptoms were very transient. There are cameras in patient's room and she was noted to be on the floor so when daughter went to assist her off the floor, she noticed patient could not stand herself up and was attempting to hold onto furniture to get up. Baseline functional status is ambulates unassisted and independent with all ADLs. Given this, daughter brought her to the ER for further evaluation. Lives in multi-level home on the second floor with grandsons. Her son lives on the first floor of house.    In the ED, VSS except BP as elevated as 191/75. Labs grossly unremarkable except blood glucose 343, alkphos 128, mag 1.5, and lactic acid 2.7. U/A with positive nitrites, small leuks, glucosuria. CT head with lacunar infarct at left superior thalamus. CT maxillofacial without fracture. CTA head with left PCA occlusion at origin. CT neck without arterial steno-occlusive disease or evidence of dissection but with right thyroid nodular/goitrous enlargement, with tracheal shift and borderline substernal extent. Received  cc bolus and  mg.

## 2024-04-06 NOTE — H&P ADULT - NSHPLABSRESULTS_GEN_ALL_CORE
12.2   7.73  )-----------( 190      ( 06 Apr 2024 16:53 )             38.1   04-06    136  |  101  |  17  ----------------------------<  343<H>  4.3   |  28  |  1.18    Ca    9.4      06 Apr 2024 16:53  Mg     1.5     04-06    TPro  8.3  /  Alb  3.8  /  TBili  0.7  /  DBili  x   /  AST  23  /  ALT  24  /  AlkPhos  128<H>  04-06    Urinalysis Basic - ( 06 Apr 2024 20:35 )    Color: Yellow / Appearance: Clear / SG: >1.030 / pH: x  Gluc: x / Ketone: Negative mg/dL  / Bili: Negative / Urobili: 1.0 mg/dL   Blood: x / Protein: Negative mg/dL / Nitrite: Positive   Leuk Esterase: Small / RBC: 2 /HPF / WBC 10 /HPF   Sq Epi: x / Non Sq Epi: x / Bacteria: Too Numerous to count /HPF    CT head without contrast 4/6/24  IMPRESSION:  Lacunar infarct at left superior thalamus, new from June 2022 and could explain right sided weakness. Chronic left PCA infarct unchanged. No acute intracranial hemorrhage or mass effect.    Case discussed with Dr. Alejandra at 4:37 PM on 4/6/24.    CT maxillofacial without contrast 4/6/24  IMPRESSION:  No fracture, intraorbital hemorrhage or radiopaque foreign body.    CTA head with IV contrast 4/6/24  IMPRESSION:  Left PCA occlusion at origin. This is new from a 2020 MRA, but probably chronic in the setting of chronic left PCA infarct stable from 2022. No vertebrobasilar stenosis.    Anterior circulation: No occlusion, but moderate-severe sites of carotid and JOHN PAUL origin stenosis appear progressed from 2020 MRA allowing for technical differences.    CTA neck with IV contrast 4/6/24  IMPRESSION:  No arterial steno-occlusive disease or evidence of dissection.    Right thyroid nodular/goitrous enlargement, with tracheal shift and borderline substernal extent.

## 2024-04-06 NOTE — H&P ADULT - ASSESSMENT
Estella Eckert is an 83 year old female with PMHx of HTN, HLD, NIDDM2, R. eye glaucoma, and dementia who presented to the ED on 4/6/24 for complaints of right sided weakness and admitted for acute CVA.    Acute CVA  Daughter reports slurred speech and inability to hold fork with R. hand yesterday around 1:30 PM and shuffling R. leg today  NIHSS 4 as per ER physician, not TNK given out of the window for thrombolytic therapy  CT head with lacunar infarct at left superior thalamus, CT maxillofacial without fracture  CTA head with L. PCA occlusion at origin, CT neck without arterial steno-occlusive disease or evidence of dissection  Neuro checks q4, diet started given passed bedside dysphagia screen  S/p  mg PO in the ED  ASA 81 mg started, PTA atorvastatin 10 mg reordered as 40 mg  Allowing for permissive HTN up to 220/120 for first 24-48 hours  F/u lipid panel, TSH, A1c for risk stratification  F/u echocardiogram with bubble study, MRI brain  Telemetry to monitor for arrhythmias  PT/OT consulted  Consult neurology - please call in AM    Recurrent falls secondary to above  Baseline ambulates unassisted  PT/OT consulted    Asymptomatic bacteriuria vs. UTI  Unable to discern if patient has dysuria or urinary frequency  Afebrile, no leukocytosis  U/A with positive nitrites, small leuks, glucosuria  Empirically started on ceftriaxone 1 g IV given possibility UTI could be contributing to falls    Lactic acidosis, improving  Lactic acid 2.7 on admission  S/p  cc bolus in the ED  F/u serial lactates    Hypomagnesemia  Mag 1.5 on admission  Mag 2 g IV ordered  F/u repeat Mag in AM  Telemetry to monitor for arrhythmias    R. thyroid enlargement, incidental finding  As seen on CTA neck  F/u TSH, U/S thyroid      Chronic medical conditions:  HTN, uncontrolled: BP as elevated as 191/75 on admission, PTA nifedipine ER 30 mg held due to allowing for permissive HTN  HLD: PTA atorvastatin 10 mg qhs reordered as 40 mg given acute CVA  NIDDM2 with hyperglycemia: last known A1c 12.3 (on 1/23/24), blood glucose 343 on admission, insulin 5 U x 1 ordered, PTA Janumet 50 /1000 mg BID held, low dose SSI qac started, blood glucose goal < 180, f/u A1c  Dementia: PTA olanzapine 2.5 mg qam and 5 mg qhs    Medication reconciliation completed using med list provided by daughter at bedside.    Plan of care discussed with daughter at bedside.

## 2024-04-06 NOTE — H&P ADULT - TIME BILLING
coordination of care with ER physician, ER PA, and ER RN, obtaining history from daughter, performing a physical examination, reviewing and interpreting labs and imaging, ordering further studies and tests, explaining the diagnosis and treatment plan to daughter, completing medication reconciliation, and documentation as above.

## 2024-04-06 NOTE — PATIENT PROFILE ADULT - FALL HARM RISK - HARM RISK INTERVENTIONS
Assistance with ambulation/Assistance OOB with selected safe patient handling equipment/Communicate Risk of Fall with Harm to all staff/Monitor for mental status changes/Move patient closer to nurses' station/Reinforce activity limits and safety measures with patient and family/Reorient to person, place and time as needed/Tailored Fall Risk Interventions/Toileting schedule using arm’s reach rule for commode and bathroom/Use of alarms - bed, chair and/or voice tab/Visual Cue: Yellow wristband and red socks/Bed in lowest position, wheels locked, appropriate side rails in place/Call bell, personal items and telephone in reach/Instruct patient to call for assistance before getting out of bed or chair/Non-slip footwear when patient is out of bed/[place] to call system/Physically safe environment - no spills, clutter or unnecessary equipment/Purposeful Proactive Rounding/Room/bathroom lighting operational, light cord in reach

## 2024-04-06 NOTE — ED ADULT TRIAGE NOTE - CHIEF COMPLAINT QUOTE
brought by ems for rt sided weakness with deficit and s/p fall yesterday times three  and today and she hit her face against the dresser and have ecchymosis to the rt side of the eye . h/o TIA's, HTN, Dm, dementia, neuropathy

## 2024-04-06 NOTE — H&P ADULT - NSHPPHYSICALEXAM_GEN_ALL_CORE
T(C): 36.9 (04-06-24 @ 20:19), Max: 36.9 (04-06-24 @ 20:19)  HR: 82 (04-06-24 @ 20:19) (65 - 86)  BP: 189/100 (04-06-24 @ 20:19) (182/96 - 191/75)  RR: 17 (04-06-24 @ 20:19) (12 - 17)  SpO2: 97% (04-06-24 @ 20:19) (97% - 98%)    CONSTITUTIONAL: Well groomed, no apparent distress  EYES: ecchymosis present on medial aspect of R. eye  ENMT: Oral mucosa with moist membranes  RESP: No respiratory distress, no use of accessory muscles; CTA b/l  CV: RRR  GI: Soft, NT, ND  NEURO: alert and oriented to person only, RLE muscle strength 4/5, LLE muscle strength 5/5. unable to assess cranial nerves or UE muscle strength

## 2024-04-06 NOTE — H&P ADULT - NSICDXPASTMEDICALHX_GEN_ALL_CORE_FT
PAST MEDICAL HISTORY:  Dementia     Diabetes mellitus type II, non insulin dependent     HLD (hyperlipidemia)     HTN (hypertension)     Neuropathy of lower extremity

## 2024-04-06 NOTE — ED PROVIDER NOTE - CLINICAL SUMMARY MEDICAL DECISION MAKING FREE TEXT BOX
83-year-old female with history of hypertension, diabetes, TIA, glaucoma of the right eye, dementia presents with right-sided weakness for the past 1 day.  Last known well was 1 PM 1 day ago per daughter at the bedside.  States she was acting normal for him.  Endorsing multiple episodes of falls over the past 1 day.  Daughter also states she was diagnosed with a UTI 1 day ago.  Normally ambulates with no equipment..  Facial trauma to the right side of the face on arrival.  Not on blood thinners.  Patient with no obvious drift on arrival to the emergency room.  Patient is outside stroke 24-hour window.  Will get CT, CTA given trauma and multiple falls in last 24 hours concerning for possible stroke like picture.  Outside tPA window and outside thrombolytic window as well.  Will check urine, labs for metabolic/infectious etiology.  EKG was performed normal sinus rhythm, no acute ischemia.  NIHSS 4 on arrival, unable to perform finger to nose test with R hand. 83-year-old female with history of hypertension, diabetes, TIA, glaucoma of the right eye, dementia presents with right-sided weakness for the past 1 day.  Last known well was 1 PM 1 day ago per daughter at the bedside.  States she was acting normal for him.  Endorsing multiple episodes of falls over the past 1 day.  Daughter also states she was diagnosed with a UTI 1 day ago.  Normally ambulates with no equipment..  Facial trauma to the right side of the face on arrival.  Not on blood thinners.  Patient with no obvious drift on arrival to the emergency room.  Patient is outside stroke 24-hour window.  Will get CT, CTA given trauma and multiple falls in last 24 hours concerning for possible stroke like picture.  Outside tPA window and outside thrombolytic window as well.  Will check urine, labs for metabolic/infectious etiology.  EKG was performed normal sinus rhythm, no acute ischemia.  NIHSS 4 on arrival, unable to perform finger to nose test with R hand.    neurology dr. curry made aware. admit to dr. putnam, medicine service. permissive hypertension up to 220/120.

## 2024-04-07 ENCOUNTER — NON-APPOINTMENT (OUTPATIENT)
Age: 84
End: 2024-04-07

## 2024-04-07 LAB
A1C WITH ESTIMATED AVERAGE GLUCOSE RESULT: 11.5 % — HIGH (ref 4–5.6)
ANION GAP SERPL CALC-SCNC: 8 MMOL/L — SIGNIFICANT CHANGE UP (ref 5–17)
BUN SERPL-MCNC: 17 MG/DL — SIGNIFICANT CHANGE UP (ref 7–23)
CALCIUM SERPL-MCNC: 8.9 MG/DL — SIGNIFICANT CHANGE UP (ref 8.5–10.1)
CHLORIDE SERPL-SCNC: 108 MMOL/L — SIGNIFICANT CHANGE UP (ref 96–108)
CHOLEST SERPL-MCNC: 142 MG/DL — SIGNIFICANT CHANGE UP
CO2 SERPL-SCNC: 25 MMOL/L — SIGNIFICANT CHANGE UP (ref 22–31)
CREAT SERPL-MCNC: 1.1 MG/DL — SIGNIFICANT CHANGE UP (ref 0.5–1.3)
EGFR: 49 ML/MIN/1.73M2 — LOW
ESTIMATED AVERAGE GLUCOSE: 283 MG/DL — HIGH (ref 68–114)
GLUCOSE BLDC GLUCOMTR-MCNC: 127 MG/DL — HIGH (ref 70–99)
GLUCOSE BLDC GLUCOMTR-MCNC: 138 MG/DL — HIGH (ref 70–99)
GLUCOSE BLDC GLUCOMTR-MCNC: 155 MG/DL — HIGH (ref 70–99)
GLUCOSE BLDC GLUCOMTR-MCNC: 264 MG/DL — HIGH (ref 70–99)
GLUCOSE SERPL-MCNC: 150 MG/DL — HIGH (ref 70–99)
HCT VFR BLD CALC: 37.2 % — SIGNIFICANT CHANGE UP (ref 34.5–45)
HDLC SERPL-MCNC: 36 MG/DL — LOW
HGB BLD-MCNC: 12 G/DL — SIGNIFICANT CHANGE UP (ref 11.5–15.5)
LACTATE SERPL-SCNC: 2.1 MMOL/L — HIGH (ref 0.7–2)
LIPID PNL WITH DIRECT LDL SERPL: 82 MG/DL — SIGNIFICANT CHANGE UP
MAGNESIUM SERPL-MCNC: 2 MG/DL — SIGNIFICANT CHANGE UP (ref 1.6–2.6)
MCHC RBC-ENTMCNC: 29.1 PG — SIGNIFICANT CHANGE UP (ref 27–34)
MCHC RBC-ENTMCNC: 32.3 G/DL — SIGNIFICANT CHANGE UP (ref 32–36)
MCV RBC AUTO: 90.1 FL — SIGNIFICANT CHANGE UP (ref 80–100)
NON HDL CHOLESTEROL: 106 MG/DL — SIGNIFICANT CHANGE UP
NRBC # BLD: 0 /100 WBCS — SIGNIFICANT CHANGE UP (ref 0–0)
PLATELET # BLD AUTO: 151 K/UL — SIGNIFICANT CHANGE UP (ref 150–400)
POTASSIUM SERPL-MCNC: 4.5 MMOL/L — SIGNIFICANT CHANGE UP (ref 3.5–5.3)
POTASSIUM SERPL-SCNC: 4.5 MMOL/L — SIGNIFICANT CHANGE UP (ref 3.5–5.3)
RBC # BLD: 4.13 M/UL — SIGNIFICANT CHANGE UP (ref 3.8–5.2)
RBC # FLD: 15 % — HIGH (ref 10.3–14.5)
SODIUM SERPL-SCNC: 141 MMOL/L — SIGNIFICANT CHANGE UP (ref 135–145)
TRIGL SERPL-MCNC: 133 MG/DL — SIGNIFICANT CHANGE UP
TSH SERPL-MCNC: 2.17 UIU/ML — SIGNIFICANT CHANGE UP (ref 0.36–3.74)
WBC # BLD: 6.35 K/UL — SIGNIFICANT CHANGE UP (ref 3.8–10.5)
WBC # FLD AUTO: 6.35 K/UL — SIGNIFICANT CHANGE UP (ref 3.8–10.5)

## 2024-04-07 PROCEDURE — 99232 SBSQ HOSP IP/OBS MODERATE 35: CPT

## 2024-04-07 PROCEDURE — 70551 MRI BRAIN STEM W/O DYE: CPT | Mod: 26

## 2024-04-07 RX ORDER — OLANZAPINE 15 MG/1
2.5 TABLET, FILM COATED ORAL ONCE
Refills: 0 | Status: COMPLETED | OUTPATIENT
Start: 2024-04-07 | End: 2024-04-07

## 2024-04-07 RX ADMIN — Medication 2: at 22:12

## 2024-04-07 RX ADMIN — Medication 81 MILLIGRAM(S): at 12:30

## 2024-04-07 RX ADMIN — GABAPENTIN 300 MILLIGRAM(S): 400 CAPSULE ORAL at 22:11

## 2024-04-07 RX ADMIN — Medication 650 MILLIGRAM(S): at 16:36

## 2024-04-07 RX ADMIN — Medication 650 MILLIGRAM(S): at 17:56

## 2024-04-07 RX ADMIN — OLANZAPINE 2.5 MILLIGRAM(S): 15 TABLET, FILM COATED ORAL at 23:50

## 2024-04-07 RX ADMIN — ATORVASTATIN CALCIUM 40 MILLIGRAM(S): 80 TABLET, FILM COATED ORAL at 22:11

## 2024-04-07 RX ADMIN — Medication 1: at 16:37

## 2024-04-07 RX ADMIN — CEFTRIAXONE 100 MILLIGRAM(S): 500 INJECTION, POWDER, FOR SOLUTION INTRAMUSCULAR; INTRAVENOUS at 22:10

## 2024-04-07 RX ADMIN — OLANZAPINE 2.5 MILLIGRAM(S): 15 TABLET, FILM COATED ORAL at 20:12

## 2024-04-07 RX ADMIN — OLANZAPINE 2.5 MILLIGRAM(S): 15 TABLET, FILM COATED ORAL at 13:28

## 2024-04-07 NOTE — CONSULT NOTE ADULT - SUBJECTIVE AND OBJECTIVE BOX
Neurology consult    ESTELLA YANEZ83yFemale     Patient is a 83y old  Female who presents with a chief complaint of Acute CVA (06 Apr 2024 21:17)      HPI:  Estella Yanez is an 83 year old female with PMHx of HTN, HLD, NIDDM2, R. eye glaucoma, and dementia who presented to the ED on 4/6/24 for complaints of right sided weakness.    History primarily obtained from daughter at bedside as patient is unable to provide history due to dementia. Patient reports she is going to the [facility] so she cannot stay in the hospital. As per daughter at bedside, patient has had five falls at home since yesterday. Around 1:30 PM yesterday, daughter was about to leave patient's house when she noticed her having slurred speech while eating lunch. She could not hold her fork in her right hand and she noticed patient holding onto right arm with her left hand. Daughter left for five minutes and then returned. States speech was resolved and patient was no longer holding onto her right arm so she assumed symptoms were very transient. There are cameras in patient's room and she was noted to be on the floor so when daughter went to assist her off the floor, she noticed patient could not stand herself up and was attempting to hold onto furniture to get up. Baseline functional status is ambulates unassisted and independent with all ADLs. Given this, daughter brought her to the ER for further evaluation. Lives in multi-level home on the second floor with grandsons. Her son lives on the first floor of house.    In the ED, VSS except BP as elevated as 191/75. Labs grossly unremarkable except blood glucose 343, alkphos 128, mag 1.5, and lactic acid 2.7. U/A with positive nitrites, small leuks, glucosuria. CT head with lacunar infarct at left superior thalamus. CT maxillofacial without fracture. CTA head with left PCA occlusion at origin. CT neck without arterial steno-occlusive disease or evidence of dissection but with right thyroid nodular/goitrous enlargement, with tracheal shift and borderline substernal extent. Received  cc bolus and  mg. (06 Apr 2024 21:17)          MEDICATIONS    acetaminophen     Tablet .. 650 milliGRAM(s) Oral every 6 hours PRN  aluminum hydroxide/magnesium hydroxide/simethicone Suspension 30 milliLiter(s) Oral every 4 hours PRN  aspirin  chewable 81 milliGRAM(s) Oral daily  atorvastatin 40 milliGRAM(s) Oral at bedtime  cefTRIAXone   IVPB 1000 milliGRAM(s) IV Intermittent every 24 hours  dextrose 10% Bolus 125 milliLiter(s) IV Bolus once  dextrose 5%. 1000 milliLiter(s) IV Continuous <Continuous>  dextrose 5%. 1000 milliLiter(s) IV Continuous <Continuous>  dextrose 50% Injectable 12.5 Gram(s) IV Push once  dextrose 50% Injectable 25 Gram(s) IV Push once  dextrose Oral Gel 15 Gram(s) Oral once PRN  gabapentin 300 milliGRAM(s) Oral at bedtime  glucagon  Injectable 1 milliGRAM(s) IntraMuscular once  insulin lispro (ADMELOG) corrective regimen sliding scale   SubCutaneous at bedtime  insulin lispro (ADMELOG) corrective regimen sliding scale   SubCutaneous three times a day before meals  melatonin 3 milliGRAM(s) Oral at bedtime PRN  OLANZapine 2.5 milliGRAM(s) Oral daily  OLANZapine 5 milliGRAM(s) Oral at bedtime  ondansetron Injectable 4 milliGRAM(s) IV Push every 8 hours PRN  sodium chloride 0.9% Bolus 500 milliLiter(s) IV Bolus once      PMH: DM (diabetes mellitus)    HTN (hypertension)    Reflux    HLD (hyperlipidemia)    Neuropathy of lower extremity    RA (rheumatoid arthritis)    Deep vein thrombosis (DVT), right    Knee pain, chronic, right    Dementia    Diabetes mellitus type II, non insulin dependent         PSH: S/P eye surgery, follow-up exam    S/P colon resection    S/P appendectomy    Total knee replacement status    S/P partial hysterectomy        Family history: No history of dementia, strokes, or seizures   FAMILY HISTORY:  Family history of diabetes mellitus (Sibling)  Type 2        SOCIAL HISTORY:  No history of tobacco or alcohol use     Allergies    No Known Allergies    Intolerances        Height (cm): 160 (04-06 @ 16:18)  Weight (kg): 65.5 (04-06 @ 23:42)  BMI (kg/m2): 25.6 (04-06 @ 23:42)    Vital Signs Last 24 Hrs  T(C): 36.3 (07 Apr 2024 08:45), Max: 36.9 (06 Apr 2024 20:19)  T(F): 97.4 (07 Apr 2024 08:45), Max: 98.5 (06 Apr 2024 20:19)  HR: 56 (07 Apr 2024 08:45) (52 - 86)  BP: 136/52 (07 Apr 2024 08:45) (115/69 - 191/75)  BP(mean): --  RR: 18 (07 Apr 2024 08:45) (12 - 18)  SpO2: 97% (07 Apr 2024 08:45) (97% - 98%)    Parameters below as of 07 Apr 2024 08:45  Patient On (Oxygen Delivery Method): room air          On Neurological Examination:  Neuro: lethargic moans  to painful stimuli does not follow commands or answer questions     CN: PERRL, EOMI, could no assess vision right facial palsy,     Motor: withdraws L > R    Sensory: Intact to pain    FTN: could not asesss            GENERAL Exam:     Nontoxic , No Acute Distress   	  HEENT:  normocephalic, atraumatic  		  LUNGS:	Clear bilaterally  No Wheeze    	  HEART:	 Normal S1S2   No murmur RRR        	  GI/ ABDOMEN:  Soft  Non tender    EXTREMITIES:   No Edema  No Clubbing  No Cyanosis No Edema    MUSCULOSKELETAL: Normal Range of Motion  	   SKIN:      Normal   No Ecchymosis               LABS:  CBC Full  -  ( 06 Apr 2024 16:53 )  WBC Count : 7.73 K/uL  RBC Count : 4.26 M/uL  Hemoglobin : 12.2 g/dL  Hematocrit : 38.1 %  Platelet Count - Automated : 190 K/uL  Mean Cell Volume : 89.4 fl  Mean Cell Hemoglobin : 28.6 pg  Mean Cell Hemoglobin Concentration : 32.0 g/dL  Auto Neutrophil # : 4.78 K/uL  Auto Lymphocyte # : 2.34 K/uL  Auto Monocyte # : 0.58 K/uL  Auto Eosinophil # : 0.00 K/uL  Auto Basophil # : 0.01 K/uL  Auto Neutrophil % : 61.8 %  Auto Lymphocyte % : 30.3 %  Auto Monocyte % : 7.5 %  Auto Eosinophil % : 0.0 %  Auto Basophil % : 0.1 %    Urinalysis Basic - ( 06 Apr 2024 20:35 )    Color: Yellow / Appearance: Clear / SG: >1.030 / pH: x  Gluc: x / Ketone: Negative mg/dL  / Bili: Negative / Urobili: 1.0 mg/dL   Blood: x / Protein: Negative mg/dL / Nitrite: Positive   Leuk Esterase: Small / RBC: 2 /HPF / WBC 10 /HPF   Sq Epi: x / Non Sq Epi: x / Bacteria: Too Numerous to count /HPF      04-06    136  |  101  |  17  ----------------------------<  343<H>  4.3   |  28  |  1.18    Ca    9.4      06 Apr 2024 16:53  Mg     2.0     04-07    TPro  8.3  /  Alb  3.8  /  TBili  0.7  /  DBili  x   /  AST  23  /  ALT  24  /  AlkPhos  128<H>  04-06    LIVER FUNCTIONS - ( 06 Apr 2024 16:53 )  Alb: 3.8 g/dL / Pro: 8.3 gm/dL / ALK PHOS: 128 U/L / ALT: 24 U/L / AST: 23 U/L / GGT: x           Hemoglobin A1C:       PT/INR - ( 06 Apr 2024 16:53 )   PT: 10.5 sec;   INR: 0.88 ratio         PTT - ( 06 Apr 2024 16:53 )  PTT:32.1 sec      RADIOLOGY  < from: CT Angio Head w/ IV Cont (04.06.24 @ 16:46) >    CT HEAD:  Lacunar infarct at left superior thalamus, new from June 2022 and could   explain right sided weakness. Chronic left PCA infarct unchanged. No   acute intracranial hemorrhage or mass effect.    Case discussed with Dr. Alejandra at 4:37 PM on 4/6/24.      CT FACE: No fracture, intraorbital hemorrhage or radiopaque foreign body.      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 This is new from a 2020 MRA, but probably   chronic in the setting of chronic left PCA infarct stable from 2022. No   vertebrobasilar stenosis.    Anterior circulation: No occlusion, but moderate-severe sites of carotid   and JOHN PAUL origin stenosis appear progressed from 2020 MRA allowing for   technical differences.      CTA NECK:  No arterial steno-occlusive disease or evidence of dissection.    Right thyroid nodular/goitrous enlargement, with tracheal shift and   borderline substernal extent.    --- End of Report ---      < end of copied text >

## 2024-04-07 NOTE — CONSULT NOTE ADULT - ASSESSMENT
83 year old female with PMHx of HTN, HLD, NIDDM2, R. eye glaucoma, and dementia who presented to the ED on 4/6/24 for complaints of right sided weakness.   Exam limited, lethargic, moans to pain no verbal output right facial, withdraws L >R NIHSS 21  CTH: Lacunar infarct at left superior thalamus, Chronic left PCA infarct unchanged.  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   Atorvastatin , titrate the dose according to LDL.   TTE and telemetry   DVT prophylaxis, Neurochecks  gradual normotension.   HbA1C and LDL.   PT/OT/Speech and swallow/safety eval.

## 2024-04-07 NOTE — PROGRESS NOTE ADULT - ASSESSMENT
Estella Eckert is an 83 year old female with PMHx of HTN, HLD, NIDDM2, R. eye glaucoma, and dementia who presented to the ED on 4/6/24 for complaints of right sided weakness and admitted for acute CVA.    Acute CVA  Daughter reports slurred speech and inability to hold fork with R. hand  no TNK given out of the window for thrombolytic therapy  CT head with lacunar infarct at left superior thalamus, CT maxillofacial without fracture  CTA head with L. PCA occlusion at origin, CT neck without arterial steno-occlusive disease or evidence of dissection  Neuro checks q4, diet started given passed bedside dysphagia screen  Neuro consulted  plan;  MRI echo  F/u lipid panel, TSH, A1c for risk stratification  Telemetry to monitor for arrhythmias  PT/OT consulte    Recurrent falls secondary to above  Baseline ambulates unassisted  PT/OT consulted    HTN/HLD  resume hypertensive meds  statin    Asymptomatic bacteriuria vs. UTI  Unable to discern if patient has dysuria or urinary frequency  Afebrile, no leukocytosis  U/A with positive nitrites, small leuks, glucosuria  Empirically started on ceftriaxone 1 g IV given possibility UTI could be contributing to falls    Lactic acidosis, improving  Lactic acid 2.7 on admission  S/p  cc bolus in the ED  F/u serial lactates    Hypomagnesemia  Mag 1.5 on admission  Mag 2 g IV ordered  F/u repeat Mag in AM  Telemetry to monitor for arrhythmias    R. thyroid enlargement, incidental finding  As seen on CTA neck  F/u TSH, U/S thyroid      NIDDM2 with hyperglycemia: last known A1c 12.3 (on 1/23/24), blood glucose 343 on admission, insulin 5 U x 1 ordered, PTA Janumet 50 /1000 mg BID held, low dose SSI qac started, blood glucose goal < 180, f/u A1c  Dementia: PTA olanzapine 2.5 mg qam and 5 mg qhs

## 2024-04-07 NOTE — CONSULT NOTE ADULT - REASON FOR ADMISSION
DISCHARGED VIA W/C, ACCOMPANIED BY SPOUSE AND KYLEE, TO POV WITH
SPOUSE DRIVING. ALL BELONGINGS AND DC PACKET WITH PT. Acute CVA

## 2024-04-08 ENCOUNTER — RESULT REVIEW (OUTPATIENT)
Age: 84
End: 2024-04-08

## 2024-04-08 ENCOUNTER — NON-APPOINTMENT (OUTPATIENT)
Age: 84
End: 2024-04-08

## 2024-04-08 PROBLEM — R73.9 HYPERGLYCEMIA: Status: ACTIVE | Noted: 2024-04-08

## 2024-04-08 LAB
ANION GAP SERPL CALC-SCNC: 6 MMOL/L — SIGNIFICANT CHANGE UP (ref 5–17)
BASOPHILS # BLD AUTO: 0 K/UL — SIGNIFICANT CHANGE UP (ref 0–0.2)
BASOPHILS NFR BLD AUTO: 0 % — SIGNIFICANT CHANGE UP (ref 0–2)
BUN SERPL-MCNC: 15 MG/DL — SIGNIFICANT CHANGE UP (ref 7–23)
CALCIUM SERPL-MCNC: 8.8 MG/DL — SIGNIFICANT CHANGE UP (ref 8.5–10.1)
CHLORIDE SERPL-SCNC: 108 MMOL/L — SIGNIFICANT CHANGE UP (ref 96–108)
CO2 SERPL-SCNC: 26 MMOL/L — SIGNIFICANT CHANGE UP (ref 22–31)
CREAT SERPL-MCNC: 0.94 MG/DL — SIGNIFICANT CHANGE UP (ref 0.5–1.3)
EGFR: 59 ML/MIN/1.73M2 — LOW
EOSINOPHIL # BLD AUTO: 0 K/UL — SIGNIFICANT CHANGE UP (ref 0–0.5)
EOSINOPHIL NFR BLD AUTO: 0 % — SIGNIFICANT CHANGE UP (ref 0–6)
GLUCOSE BLDC GLUCOMTR-MCNC: 191 MG/DL — HIGH (ref 70–99)
GLUCOSE BLDC GLUCOMTR-MCNC: 196 MG/DL — HIGH (ref 70–99)
GLUCOSE BLDC GLUCOMTR-MCNC: 247 MG/DL — HIGH (ref 70–99)
GLUCOSE BLDC GLUCOMTR-MCNC: 360 MG/DL — HIGH (ref 70–99)
GLUCOSE SERPL-MCNC: 200 MG/DL — HIGH (ref 70–99)
HCT VFR BLD CALC: 35.3 % — SIGNIFICANT CHANGE UP (ref 34.5–45)
HGB BLD-MCNC: 11.4 G/DL — LOW (ref 11.5–15.5)
IMM GRANULOCYTES NFR BLD AUTO: 0.2 % — SIGNIFICANT CHANGE UP (ref 0–0.9)
LYMPHOCYTES # BLD AUTO: 1.82 K/UL — SIGNIFICANT CHANGE UP (ref 1–3.3)
LYMPHOCYTES # BLD AUTO: 34.8 % — SIGNIFICANT CHANGE UP (ref 13–44)
MCHC RBC-ENTMCNC: 29 PG — SIGNIFICANT CHANGE UP (ref 27–34)
MCHC RBC-ENTMCNC: 32.3 G/DL — SIGNIFICANT CHANGE UP (ref 32–36)
MCV RBC AUTO: 89.8 FL — SIGNIFICANT CHANGE UP (ref 80–100)
MONOCYTES # BLD AUTO: 0.37 K/UL — SIGNIFICANT CHANGE UP (ref 0–0.9)
MONOCYTES NFR BLD AUTO: 7.1 % — SIGNIFICANT CHANGE UP (ref 2–14)
NEUTROPHILS # BLD AUTO: 3.03 K/UL — SIGNIFICANT CHANGE UP (ref 1.8–7.4)
NEUTROPHILS NFR BLD AUTO: 57.9 % — SIGNIFICANT CHANGE UP (ref 43–77)
NRBC # BLD: 0 /100 WBCS — SIGNIFICANT CHANGE UP (ref 0–0)
PLATELET # BLD AUTO: 154 K/UL — SIGNIFICANT CHANGE UP (ref 150–400)
POTASSIUM SERPL-MCNC: 4.1 MMOL/L — SIGNIFICANT CHANGE UP (ref 3.5–5.3)
POTASSIUM SERPL-SCNC: 4.1 MMOL/L — SIGNIFICANT CHANGE UP (ref 3.5–5.3)
RBC # BLD: 3.93 M/UL — SIGNIFICANT CHANGE UP (ref 3.8–5.2)
RBC # FLD: 14.6 % — HIGH (ref 10.3–14.5)
SODIUM SERPL-SCNC: 140 MMOL/L — SIGNIFICANT CHANGE UP (ref 135–145)
TSH SERPL-MCNC: 1.58 UU/ML — SIGNIFICANT CHANGE UP (ref 0.36–3.74)
WBC # BLD: 5.23 K/UL — SIGNIFICANT CHANGE UP (ref 3.8–10.5)
WBC # FLD AUTO: 5.23 K/UL — SIGNIFICANT CHANGE UP (ref 3.8–10.5)

## 2024-04-08 PROCEDURE — 93306 TTE W/DOPPLER COMPLETE: CPT | Mod: 26

## 2024-04-08 PROCEDURE — 99232 SBSQ HOSP IP/OBS MODERATE 35: CPT

## 2024-04-08 PROCEDURE — 76536 US EXAM OF HEAD AND NECK: CPT | Mod: 26

## 2024-04-08 RX ORDER — OLANZAPINE 15 MG/1
2.5 TABLET, FILM COATED ORAL ONCE
Refills: 0 | Status: COMPLETED | OUTPATIENT
Start: 2024-04-08 | End: 2024-04-08

## 2024-04-08 RX ADMIN — Medication 81 MILLIGRAM(S): at 11:48

## 2024-04-08 RX ADMIN — Medication 1: at 11:49

## 2024-04-08 RX ADMIN — ATORVASTATIN CALCIUM 40 MILLIGRAM(S): 80 TABLET, FILM COATED ORAL at 22:21

## 2024-04-08 RX ADMIN — GABAPENTIN 300 MILLIGRAM(S): 400 CAPSULE ORAL at 22:20

## 2024-04-08 RX ADMIN — Medication 650 MILLIGRAM(S): at 22:20

## 2024-04-08 RX ADMIN — Medication 650 MILLIGRAM(S): at 23:29

## 2024-04-08 RX ADMIN — OLANZAPINE 2.5 MILLIGRAM(S): 15 TABLET, FILM COATED ORAL at 11:49

## 2024-04-08 RX ADMIN — Medication 1: at 08:25

## 2024-04-08 RX ADMIN — Medication 3 MILLIGRAM(S): at 23:36

## 2024-04-08 RX ADMIN — OLANZAPINE 5 MILLIGRAM(S): 15 TABLET, FILM COATED ORAL at 22:21

## 2024-04-08 RX ADMIN — OLANZAPINE 2.5 MILLIGRAM(S): 15 TABLET, FILM COATED ORAL at 18:24

## 2024-04-08 RX ADMIN — Medication 5: at 17:37

## 2024-04-08 NOTE — CHART NOTE - NSCHARTNOTEFT_GEN_A_CORE
Patient down for a test    MRI: DWI-positive recent infarct at left thalamus, and acute-on-chronic at   left occipital lobe in setting of PCA occlusion      continue with workup as in yesterdays note  outpt loop

## 2024-04-08 NOTE — PROGRESS NOTE ADULT - ASSESSMENT
82 y/o F w/ PMH of HTN, HLD, NIDDM2, R. eye glaucoma, and dementia who presented to the ED on 4/6/24 for complaints of right sided weakness and admitted for acute CVA.    Acute CVA  Daughter reports slurred speech and inability to hold fork with R. hand  no TNK given out of the window for thrombolytic therapy  CT head with lacunar infarct at left superior thalamus, CT maxillofacial without fracture  CTA head with L. PCA occlusion at origin, CT neck without arterial steno-occlusive disease or evidence of dissection  Neuro checks q4, diet started given passed bedside dysphagia screen  Neuro eval noted- check MRI and TTE  Outpt loop recorder  Follow echo  MRI Brain : >  recent infarct at left thalamus, and acute-on-chronic at left occipital lobe in setting of PCA occlusion  HbA1C 11.5  LDL 82  PT/OT eval     Recurrent falls secondary to above  Baseline ambulates unassisted  PT/OT consulted    HTN/HLD  c/w anti htnves  statin      UTI  UCx + for E.Coli  IV CTX pending final ID/ sensitivity of UCx    Lactic acidosis, improving  s/p IVF in ED    Hypomagnesemia- s/p supplementation- Mg improved    R. thyroid enlargement, incidental finding  As seen on CTA neck  F/u TSH, and US Thyroid    NIDDM2- uncontrolled-HbA1c elevated- Janumet on hodl at admit- lactic acidosis on admit may be sec to metformin  Monitor fsbs/ISS    Dementia: PTA olanzapine 2.5 mg q am and 5 mg qhs     84 y/o F w/ PMH of HTN, HLD, NIDDM2, R. eye glaucoma, and dementia who presented to the ED on 4/6/24 for complaints of right sided weakness and admitted for acute CVA.    Acute CVA  Daughter reports slurred speech and inability to hold fork with R. hand  no TNK given out of the window for thrombolytic therapy  CT head with lacunar infarct at left superior thalamus, CT maxillofacial without fracture  CTA head with L. PCA occlusion at origin, CT neck without arterial steno-occlusive disease or evidence of dissection  Neuro checks q4, diet started given passed bedside dysphagia screen  Neuro eval noted- check MRI and TTE  Outpt loop recorder  Follow echo  MRI Brain : >  recent infarct at left thalamus, and acute-on-chronic at left occipital lobe in setting of PCA occlusion  HbA1C 11.5  LDL 82  PT/OT eval     Recurrent falls secondary to above  Baseline ambulates unassisted  PT/OT consulted    HTN/HLD  c/w anti htnves  statin      UTI  UCx + for E.Coli  IV CTX pending final ID/ sensitivity of UCx    Lactic acidosis, improving  s/p IVF in ED    Hypomagnesemia- s/p supplementation- Mg improved    R. thyroid enlargement, incidental finding  As seen on CTA neck  F/u TSH, and US Thyroid    NIDDM2- uncontrolled-HbA1c elevated- Janumet on hodl at admit- lactic acidosis on admit may be sec to metformin  Monitor fsbs/ISS    Dementia: PTA olanzapine 2.5 mg q am and 5 mg qhs  fall precautions    likely dc tomorrow once echo done and UCx finalized.

## 2024-04-09 LAB
GLUCOSE BLDC GLUCOMTR-MCNC: 249 MG/DL — HIGH (ref 70–99)
GLUCOSE BLDC GLUCOMTR-MCNC: 265 MG/DL — HIGH (ref 70–99)
GLUCOSE BLDC GLUCOMTR-MCNC: 322 MG/DL — HIGH (ref 70–99)
GLUCOSE BLDC GLUCOMTR-MCNC: 375 MG/DL — HIGH (ref 70–99)

## 2024-04-09 PROCEDURE — 99232 SBSQ HOSP IP/OBS MODERATE 35: CPT

## 2024-04-09 PROCEDURE — 93010 ELECTROCARDIOGRAM REPORT: CPT

## 2024-04-09 RX ORDER — INSULIN LISPRO 100/ML
3 VIAL (ML) SUBCUTANEOUS
Refills: 0 | Status: DISCONTINUED | OUTPATIENT
Start: 2024-04-09 | End: 2024-04-11

## 2024-04-09 RX ORDER — ENOXAPARIN SODIUM 100 MG/ML
40 INJECTION SUBCUTANEOUS EVERY 24 HOURS
Refills: 0 | Status: DISCONTINUED | OUTPATIENT
Start: 2024-04-09 | End: 2024-04-11

## 2024-04-09 RX ORDER — SITAGLIPTIN AND METFORMIN HYDROCHLORIDE 500; 50 MG/1; MG/1
1 TABLET, FILM COATED ORAL
Qty: 0 | Refills: 0 | DISCHARGE

## 2024-04-09 RX ORDER — QUETIAPINE FUMARATE 200 MG/1
25 TABLET, FILM COATED ORAL AT BEDTIME
Refills: 0 | Status: DISCONTINUED | OUTPATIENT
Start: 2024-04-09 | End: 2024-04-09

## 2024-04-09 RX ORDER — INSULIN GLARGINE 100 [IU]/ML
5 INJECTION, SOLUTION SUBCUTANEOUS AT BEDTIME
Refills: 0 | Status: DISCONTINUED | OUTPATIENT
Start: 2024-04-09 | End: 2024-04-11

## 2024-04-09 RX ORDER — OLANZAPINE 15 MG/1
5 TABLET, FILM COATED ORAL EVERY 24 HOURS
Refills: 0 | Status: DISCONTINUED | OUTPATIENT
Start: 2024-04-09 | End: 2024-04-11

## 2024-04-09 RX ADMIN — ATORVASTATIN CALCIUM 40 MILLIGRAM(S): 80 TABLET, FILM COATED ORAL at 21:34

## 2024-04-09 RX ADMIN — GABAPENTIN 300 MILLIGRAM(S): 400 CAPSULE ORAL at 21:34

## 2024-04-09 RX ADMIN — OLANZAPINE 2.5 MILLIGRAM(S): 15 TABLET, FILM COATED ORAL at 11:41

## 2024-04-09 RX ADMIN — Medication 5: at 11:41

## 2024-04-09 RX ADMIN — Medication 3 MILLIGRAM(S): at 22:25

## 2024-04-09 RX ADMIN — Medication 3: at 17:58

## 2024-04-09 RX ADMIN — Medication 3 UNIT(S): at 11:41

## 2024-04-09 RX ADMIN — OLANZAPINE 5 MILLIGRAM(S): 15 TABLET, FILM COATED ORAL at 21:33

## 2024-04-09 RX ADMIN — Medication 2: at 08:42

## 2024-04-09 RX ADMIN — INSULIN GLARGINE 5 UNIT(S): 100 INJECTION, SOLUTION SUBCUTANEOUS at 21:34

## 2024-04-09 RX ADMIN — Medication 81 MILLIGRAM(S): at 11:41

## 2024-04-09 RX ADMIN — Medication 3 UNIT(S): at 17:59

## 2024-04-09 RX ADMIN — Medication 4: at 21:35

## 2024-04-09 RX ADMIN — ENOXAPARIN SODIUM 40 MILLIGRAM(S): 100 INJECTION SUBCUTANEOUS at 17:58

## 2024-04-09 NOTE — OCCUPATIONAL THERAPY INITIAL EVALUATION ADULT - GENERAL OBSERVATIONS, REHAB EVAL
Pt encountered in bedside chair, NAD, pt noted with generalized weakness & impaired dynamic standing balance.

## 2024-04-09 NOTE — PHYSICAL THERAPY INITIAL EVALUATION ADULT - MODALITIES TREATMENT COMMENTS
CT head with lacunar infarct at left superior thalamus. CT maxillofacial without fracture. CTA head with left PCA occlusion at origin. CT neck without arterial steno-occlusive disease or evidence of dissection but with right thyroid nodular/goitrous enlargement, with tracheal shift and borderline substernal extent.  MRI Brain : recent infarct at left thalamus, and acute-on-chronic at left occipital lobe in setting of PCA occlusion

## 2024-04-09 NOTE — SWALLOW BEDSIDE ASSESSMENT ADULT - H & P REVIEW
"As per daughter at bedside, patient has had five falls at home since yesterday. Around 1:30 PM yesterday, daughter was about to leave patient's house when she noticed her having slurred speech while eating lunch. She could not hold her fork in her right hand and she noticed patient holding onto right arm with her left hand. Daughter left for five minutes and then returned. States speech was resolved and patient was no longer holding onto her right arm so she assumed symptoms were very transient. There are cameras in patient's room and she was noted to be on the floor so when daughter went to assist her off the floor, she noticed patient could not stand herself up and was attempting to hold onto furniture to get up. Baseline functional status is ambulates unassisted and independent with all ADLs. Given this, daughter brought her to the ER for further evaluation."/yes

## 2024-04-09 NOTE — PHYSICAL THERAPY INITIAL EVALUATION ADULT - TRANSFER TRAINING, PT EVAL
Pt will safely perform functional transfers using least restrictive AD with supervision with proper hand/feet placement, proper sequencing and proper body mechanics.

## 2024-04-09 NOTE — PROGRESS NOTE ADULT - ASSESSMENT
84 y/o F w/ PMH of HTN, HLD, NIDDM2, R. eye glaucoma, and dementia who presented to the ED on 4/6/24 for complaints of right sided weakness and admitted for acute CVA.    Acute CVA  Daughter reports slurred speech and inability to hold fork with R. hand  no TNK given out of the window for thrombolytic therapy  CT head with lacunar infarct at left superior thalamus, CT maxillofacial without fracture  CTA head with L. PCA occlusion at origin, CT neck without arterial steno-occlusive disease or evidence of dissection  Neuro checks q4, diet started given passed bedside dysphagia screen  Neuro eval noted- check MRI and TTE  Outpt loop recorder  Follow echo  MRI Brain : >  recent infarct at left thalamus, and acute-on-chronic at left occipital lobe in setting of PCA occlusion  HbA1C 11.5  LDL 82  PT/OT eval     Recurrent falls secondary to above  Baseline ambulates unassisted  PT/OT consulted  - will need outpatient follow up with PCP for thyroid nodule.     HTN/HLD  c/w anti htnves  statin      UTI  UCx + for E.Coli  s/p CTX    Lactic acidosis, improving  s/p IVF in ED    Hypomagnesemia- s/p supplementation- Mg improved    R. thyroid enlargement, incidental finding  As seen on CTA neck  will need outpatient endo f/u    NIDDM2- uncontrolled-HbA1c elevated- Janumet on hodl at admit- lactic acidosis on admit may be sec to metformin  Monitor fsbs/ISS    Dementia: PTA olanzapine 2.5 mg q am and 5 mg qhs  fall precautions    likely dc tomorrow once echo done and UCx finalized.

## 2024-04-09 NOTE — PROGRESS NOTE ADULT - ASSESSMENT
83 year old female with PMHx of HTN, HLD, NIDDM2, R. eye glaucoma, and dementia who presented to the ED on 4/6/24 for complaints of right sided weakness.   Exam limited, lethargic, moans to pain no verbal output right facial, withdraws L >R NIHSS 21  CTH: Lacunar infarct at left superior thalamus, Chronic left PCA infarct unchanged.  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  TTE reviewed     Impression: CVA ? ESUS      Aspirin for secondary stroke prevention at this time.   Atorvastatin , titrate the dose according to LDL.   telemetry   loop recorder as an outp[t if aligned with GOCs  DVT prophylaxis, Neurochecks  gradual normotension.   HbA1C 11.5 and LDL 82.   PT/OT/Speech and swallow/safety eval.

## 2024-04-09 NOTE — PHYSICAL THERAPY INITIAL EVALUATION ADULT - GAIT TRAINING, PT EVAL
Pt will safely ambulate 200 ft using least restrictive AD with supervision with proper hand/feet placement, proper sequencing and proper body mechanics

## 2024-04-09 NOTE — OCCUPATIONAL THERAPY INITIAL EVALUATION ADULT - ADDITIONAL COMMENTS
As per pt and H&P - Pt is independent with ADLs and mobility at baseline. Pt lives in multi-level home on the second floor with grandsons. Her son lives on the first floor of house.

## 2024-04-09 NOTE — SWALLOW BEDSIDE ASSESSMENT ADULT - ORAL PHASE
Within functional limits independently cleared by pt and clinician provided oral care/Delayed oral transit time/Lingual stasis

## 2024-04-09 NOTE — PHYSICAL THERAPY INITIAL EVALUATION ADULT - ADDITIONAL COMMENTS
Pt lives with family in a multi-level home with 5 steps to enter. Prior to admission, pt was able to ambulate unassisted without any AD. Pt has HHA via CDPAP approx 35 hrs for ADLs. Pt lives with family in a multi-level home on 2nd floor with 5 steps to enter. Son lives on 1st floor. Prior to admission and based on chart review, "baseline functional status is ambulates unassisted and independent with all ADLs". Pt has HHA via CDPAP approx 35 hrs for ADLs.    Attempted to call emergency contact Jodee for confirmation of PLOF, however call went to voicemail.

## 2024-04-09 NOTE — SWALLOW BEDSIDE ASSESSMENT ADULT - SWALLOW EVAL: DIAGNOSIS
pt presented with mild oropharyngeal dysphagia for easy to chew and thin liquid. oral phase marked by adequate oral containment/labial seal, slightly increased mastication time with decreased bolus formation resulting in trace to mild lingual residue for solid- spontaneous reswallow cleared oral residue (maybe 2/2 incomplete dentition) and adequate AP transport. +initiation of pharyngeal swallow trigger and hyolaryngeal elevation to palpation. no overt clinical signs of airway penetration.

## 2024-04-09 NOTE — PHYSICAL THERAPY INITIAL EVALUATION ADULT - BALANCE TRAINING, PT EVAL
Pt will demonstrate improvement in dynamic standing balance by 1-2 grade within 2 weeks to facilitate improvement in performance and safety of mobility skills

## 2024-04-09 NOTE — PHYSICAL THERAPY INITIAL EVALUATION ADULT - GENERAL OBSERVATIONS, REHAB EVAL
Pt encountered out of bed sitting at bedside chair, NAD, +posey belt +chair alarm +cardiac monitor +primafit. Pt is AOx1 (person), able to follow single step instructions 75% of the time given additional time to process info.

## 2024-04-09 NOTE — PHYSICAL THERAPY INITIAL EVALUATION ADULT - STRENGTHENING, PT EVAL
Pt will demonstrate improvement in BLE muscle strength by 1-2 grade within 2 weeks to facilitate improvement in performance and safety of mobility skills

## 2024-04-09 NOTE — OCCUPATIONAL THERAPY INITIAL EVALUATION ADULT - PERTINENT HX OF CURRENT PROBLEM, REHAB EVAL
Estella Eckert is an 83 year old female with PMHx of HTN, HLD, NIDDM2, R. eye glaucoma, and dementia who presented to the ED on 4/6/24 for complaints of right sided weakness and admitted for acute CVA

## 2024-04-09 NOTE — SWALLOW BEDSIDE ASSESSMENT ADULT - SLP GENERAL OBSERVATIONS
pt seen sitting upright in the chair, fairly alert given prompts and on RA. pt responded to simple questions for assessment, she verbalized wants and was able to follow directives.

## 2024-04-09 NOTE — PHYSICAL THERAPY INITIAL EVALUATION ADULT - PERTINENT HX OF CURRENT PROBLEM, REHAB EVAL
[facility] home-      "As per daughter at bedside, patient has had five falls at home since yesterday. Around 1:30 PM yesterday, daughter was about to leave patient's house when she noticed her having slurred speech while eating lunch. She could not hold her fork in her right hand and she noticed patient holding onto right arm with her left hand. Daughter left for five minutes and then returned. States speech was resolved and patient was no longer holding onto her right arm so she assumed symptoms were very transient. There are cameras in patient's room and she was noted to be on the floor so when daughter went to assist her off the floor, she noticed patient could not stand herself up and was attempting to hold onto furniture to get up. Baseline functional status is ambulates unassisted and independent with all ADLs. Given this, daughter brought her to the ER for further evaluation."

## 2024-04-09 NOTE — SWALLOW BEDSIDE ASSESSMENT ADULT - COMMENTS
MRI head no contrast 4/7/2024 IMPRESSION: DWI-positive recent infarct at left thalamus, and acute-on-chronic at left occipital lobe in setting of PCA occlusion.    CXR 4/6/2024 COMPARISON: 11/15/2022 The cardiomediastinal silhouette is normal given AP technique and the nick are not enlarged. Suboptimal degree of inspiration with bronchovascular crowding. There is no focal lung consolidation or sizable pleural effusion. No significant osseous abnormality. Degenerative changes of the spine. IMPRESSION: Unremarkable frontal chest x ray

## 2024-04-10 LAB
-  AMOXICILLIN/CLAVULANIC ACID: SIGNIFICANT CHANGE UP
-  AMPICILLIN/SULBACTAM: SIGNIFICANT CHANGE UP
-  AMPICILLIN: SIGNIFICANT CHANGE UP
-  AZTREONAM: SIGNIFICANT CHANGE UP
-  CEFAZOLIN: SIGNIFICANT CHANGE UP
-  CEFEPIME: SIGNIFICANT CHANGE UP
-  CEFOXITIN: SIGNIFICANT CHANGE UP
-  CEFTRIAXONE: SIGNIFICANT CHANGE UP
-  CEFUROXIME: SIGNIFICANT CHANGE UP
-  CIPROFLOXACIN: SIGNIFICANT CHANGE UP
-  ERTAPENEM: SIGNIFICANT CHANGE UP
-  GENTAMICIN: SIGNIFICANT CHANGE UP
-  IMIPENEM: SIGNIFICANT CHANGE UP
-  LEVOFLOXACIN: SIGNIFICANT CHANGE UP
-  MEROPENEM: SIGNIFICANT CHANGE UP
-  NITROFURANTOIN: SIGNIFICANT CHANGE UP
-  PIPERACILLIN/TAZOBACTAM: SIGNIFICANT CHANGE UP
-  TOBRAMYCIN: SIGNIFICANT CHANGE UP
-  TRIMETHOPRIM/SULFAMETHOXAZOLE: SIGNIFICANT CHANGE UP
CULTURE RESULTS: ABNORMAL
GLUCOSE BLDC GLUCOMTR-MCNC: 171 MG/DL — HIGH (ref 70–99)
GLUCOSE BLDC GLUCOMTR-MCNC: 192 MG/DL — HIGH (ref 70–99)
GLUCOSE BLDC GLUCOMTR-MCNC: 204 MG/DL — HIGH (ref 70–99)
GLUCOSE BLDC GLUCOMTR-MCNC: 205 MG/DL — HIGH (ref 70–99)
METHOD TYPE: SIGNIFICANT CHANGE UP
ORGANISM # SPEC MICROSCOPIC CNT: ABNORMAL
ORGANISM # SPEC MICROSCOPIC CNT: SIGNIFICANT CHANGE UP
SPECIMEN SOURCE: SIGNIFICANT CHANGE UP

## 2024-04-10 PROCEDURE — 99232 SBSQ HOSP IP/OBS MODERATE 35: CPT

## 2024-04-10 RX ADMIN — Medication 1: at 16:53

## 2024-04-10 RX ADMIN — INSULIN GLARGINE 5 UNIT(S): 100 INJECTION, SOLUTION SUBCUTANEOUS at 21:10

## 2024-04-10 RX ADMIN — ATORVASTATIN CALCIUM 40 MILLIGRAM(S): 80 TABLET, FILM COATED ORAL at 21:11

## 2024-04-10 RX ADMIN — ENOXAPARIN SODIUM 40 MILLIGRAM(S): 100 INJECTION SUBCUTANEOUS at 17:40

## 2024-04-10 RX ADMIN — Medication 2: at 08:12

## 2024-04-10 RX ADMIN — Medication 3 UNIT(S): at 16:53

## 2024-04-10 RX ADMIN — Medication 3 UNIT(S): at 11:47

## 2024-04-10 RX ADMIN — OLANZAPINE 2.5 MILLIGRAM(S): 15 TABLET, FILM COATED ORAL at 11:47

## 2024-04-10 RX ADMIN — Medication 2: at 11:47

## 2024-04-10 RX ADMIN — GABAPENTIN 300 MILLIGRAM(S): 400 CAPSULE ORAL at 21:11

## 2024-04-10 RX ADMIN — Medication 3 UNIT(S): at 08:12

## 2024-04-10 RX ADMIN — OLANZAPINE 5 MILLIGRAM(S): 15 TABLET, FILM COATED ORAL at 21:11

## 2024-04-10 RX ADMIN — Medication 81 MILLIGRAM(S): at 11:47

## 2024-04-10 NOTE — PROGRESS NOTE ADULT - ASSESSMENT
83 year old female with PMHx of HTN, HLD, NIDDM2, R. eye glaucoma, and dementia who presented to the ED on 4/6/24 for complaints of right sided weakness.   PE improving right visual deficits right sided weakness  CTH: Lacunar infarct at left superior thalamus, Chronic left PCA infarct unchanged.  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  TTE reviewed     Impression: CVA ? ESUS      Aspirin for secondary stroke prevention at this time.   Atorvastatin , titrate the dose to LDL goal <70.   telemetry   loop recorder as an outp[t if aligned with GOCs  DVT prophylaxis, Neurochecks  gradual normotension.   HbA1C 11.5 and LDL 82.   glucose control  PT/OT/Speech and swallow/safety eval.

## 2024-04-10 NOTE — PROGRESS NOTE ADULT - NUTRITIONAL ASSESSMENT
pt at high risk for fall, given dementia she tries to climb out fo bed, will place on ICD for dvt ppx, fall precautions

## 2024-04-10 NOTE — PROGRESS NOTE ADULT - ASSESSMENT
82 y/o F w/ PMH of HTN, HLD, NIDDM2, R. eye glaucoma, and dementia who presented to the ED on 4/6/24 for complaints of right sided weakness and admitted for acute CVA.    Acute CVA  Daughter reports slurred speech and inability to hold fork with R. hand  no TNK given out of the window for thrombolytic therapy  CT head with lacunar infarct at left superior thalamus, CT maxillofacial without fracture  CTA head with L. PCA occlusion at origin, CT neck without arterial steno-occlusive disease or evidence of dissection  Neuro checks q4, diet started given passed bedside dysphagia screen  Neuro eval noted- check MRI and TTE  Outpt loop recorder  Follow echo  MRI Brain : >  recent infarct at left thalamus, and acute-on-chronic at left occipital lobe in setting of PCA occlusion  HbA1C 11.5  LDL 82  PT/OT eval     Recurrent falls secondary to above  Baseline ambulates unassisted  PT/OT consulted  - will need outpatient follow up with PCP for thyroid nodule.     HTN/HLD  c/w anti htnves  statin      UTI  UCx + for E.Coli  s/p CTX    Lactic acidosis, improving  s/p IVF in ED    Hypomagnesemia- s/p supplementation- Mg improved    R. thyroid enlargement, incidental finding  As seen on CTA neck  will need outpatient endo f/u    NIDDM2- uncontrolled-HbA1c elevated- Janumet on hodl at admit- lactic acidosis on admit may be sec to metformin  Monitor fsbs/ISS    Dementia: PTA olanzapine 2.5 mg q am and 5 mg qhs  fall precautions    Dispo to [facility] pending. Few accepting facilities. Auth pending.

## 2024-04-11 ENCOUNTER — TRANSCRIPTION ENCOUNTER (OUTPATIENT)
Age: 84
End: 2024-04-11

## 2024-04-11 ENCOUNTER — NON-APPOINTMENT (OUTPATIENT)
Age: 84
End: 2024-04-11

## 2024-04-11 VITALS
TEMPERATURE: 97 F | RESPIRATION RATE: 18 BRPM | SYSTOLIC BLOOD PRESSURE: 154 MMHG | DIASTOLIC BLOOD PRESSURE: 61 MMHG | OXYGEN SATURATION: 97 % | HEART RATE: 100 BPM

## 2024-04-11 LAB
GLUCOSE BLDC GLUCOMTR-MCNC: 170 MG/DL — HIGH (ref 70–99)
GLUCOSE BLDC GLUCOMTR-MCNC: 242 MG/DL — HIGH (ref 70–99)

## 2024-04-11 PROCEDURE — 99239 HOSP IP/OBS DSCHRG MGMT >30: CPT

## 2024-04-11 RX ORDER — OLANZAPINE 15 MG/1
1 TABLET, FILM COATED ORAL
Qty: 0 | Refills: 0 | DISCHARGE
Start: 2024-04-11

## 2024-04-11 RX ORDER — OLANZAPINE 15 MG/1
1 TABLET, FILM COATED ORAL
Refills: 0 | DISCHARGE

## 2024-04-11 RX ORDER — GABAPENTIN 400 MG/1
1 CAPSULE ORAL
Qty: 0 | Refills: 0 | DISCHARGE
Start: 2024-04-11

## 2024-04-11 RX ORDER — CLOPIDOGREL BISULFATE 75 MG/1
1 TABLET, FILM COATED ORAL
Qty: 0 | Refills: 0 | DISCHARGE

## 2024-04-11 RX ORDER — INSULIN GLARGINE 100 [IU]/ML
5 INJECTION, SOLUTION SUBCUTANEOUS
Qty: 0 | Refills: 0 | DISCHARGE
Start: 2024-04-11

## 2024-04-11 RX ORDER — ASPIRIN/CALCIUM CARB/MAGNESIUM 324 MG
1 TABLET ORAL
Qty: 0 | Refills: 0 | DISCHARGE
Start: 2024-04-11

## 2024-04-11 RX ORDER — GABAPENTIN 400 MG/1
1 CAPSULE ORAL
Refills: 0 | DISCHARGE

## 2024-04-11 RX ORDER — ATORVASTATIN CALCIUM 80 MG/1
1 TABLET, FILM COATED ORAL
Qty: 0 | Refills: 0 | DISCHARGE
Start: 2024-04-11

## 2024-04-11 RX ORDER — ATORVASTATIN CALCIUM 80 MG/1
1 TABLET, FILM COATED ORAL
Refills: 0 | DISCHARGE

## 2024-04-11 RX ADMIN — Medication 3 UNIT(S): at 11:30

## 2024-04-11 RX ADMIN — Medication 81 MILLIGRAM(S): at 11:29

## 2024-04-11 RX ADMIN — OLANZAPINE 2.5 MILLIGRAM(S): 15 TABLET, FILM COATED ORAL at 11:29

## 2024-04-11 RX ADMIN — Medication 2: at 11:29

## 2024-04-11 NOTE — DISCHARGE NOTE PROVIDER - NSDCMRMEDTOKEN_GEN_ALL_CORE_FT
atorvastatin 10 mg oral tablet: 1 tab(s) orally once a day  gabapentin 300 mg oral tablet: 1 tab(s) orally once a day (at bedtime)  Janumet 50 mg-1000 mg oral tablet: 1 tab(s) orally 2 times a day  NIFEdipine 30 mg oral tablet, extended release: 1 tab(s) orally once a day  OLANZapine 2.5 mg oral tablet: 1 tab(s) orally once a day  OLANZapine 5 mg oral tablet: 1 tab(s) orally once a day (at bedtime)   aspirin 81 mg oral tablet, chewable: 1 tab(s) orally once a day  atorvastatin 40 mg oral tablet: 1 tab(s) orally once a day (at bedtime)  gabapentin 300 mg oral capsule: 1 cap(s) orally once a day (at bedtime)  insulin glargine 100 units/mL subcutaneous solution: 5 unit(s) subcutaneous once a day (at bedtime)  Janumet 50 mg-1000 mg oral tablet: 1 tab(s) orally 2 times a day  NIFEdipine 30 mg oral tablet, extended release: 1 tab(s) orally once a day  OLANZapine 2.5 mg oral tablet: 1 tab(s) orally once a day  OLANZapine 5 mg oral tablet: 1 tab(s) orally every 24 hours  Plavix 75 mg oral tablet: 1 tab(s) orally once a day Till 5/2

## 2024-04-11 NOTE — DISCHARGE NOTE PROVIDER - CARE PROVIDER_API CALL
Lakia Ervin  Internal Medicine  [address], [address]  [place] [address]  Phone: [phone]  Fax: [phone]  Follow Up Time:     Daniel River)  Neurology  [address]  [place] [address]  Phone: [phone]  Fax: [phone]  Follow Up Time:

## 2024-04-11 NOTE — DISCHARGE NOTE NURSING/CASE MANAGEMENT/SOCIAL WORK - NSDCPEFALRISK_GEN_ALL_CORE
For information on Fall & Injury Prevention, visit: [email or web address] OR  [email or web address] OR  https://www.cdc.gov/steadi/patient.html

## 2024-04-11 NOTE — DISCHARGE NOTE PROVIDER - NSDCCPCAREPLAN_GEN_ALL_CORE_FT
PRINCIPAL DISCHARGE DIAGNOSIS  Diagnosis: Acute cerebrovascular accident (CVA)  Assessment and Plan of Treatment: Acute stroke on MRI.  Continue medications.  PT and rehab as tolerated.  Outpatient follow up with neurologist.      SECONDARY DISCHARGE DIAGNOSES  Diagnosis: Type 2 diabetes mellitus with hyperglycemia, without long-term current use of insulin  Assessment and Plan of Treatment: a1c is 11.5 - uncontrolled.  Continue all medications as directed.  Consistent carbohydrate diet    Diagnosis: Acute UTI  Assessment and Plan of Treatment: Completed course of antibiotics while in the hospital.    Diagnosis: Enlarged thyroid  Assessment and Plan of Treatment: Outpatient follow up for repeat scans

## 2024-04-11 NOTE — PROGRESS NOTE ADULT - ASSESSMENT
83 year old female with PMHx of HTN, HLD, NIDDM2, R. eye glaucoma, and dementia who presented to the ED on 4/6/24 for complaints of right sided weakness.   PE improving right visual deficits right sided weakness  CTH: Lacunar infarct at left superior thalamus, Chronic left PCA infarct unchanged.  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  TTE reviewed     Impression: CVA ? ESUS      Aspirin for secondary stroke prevention at this time.   Atorvastatin , titrate the dose to LDL goal <70.   telemetry   loop recorder as an outpt if aligned with GOCs  DVT prophylaxis, Neurochecks  gradual normotension.   HbA1C 11.5 and LDL 82.   glucose control  PT/OT/Speech and swallow/safety eval.

## 2024-04-11 NOTE — DISCHARGE NOTE PROVIDER - ATTENDING DISCHARGE PHYSICAL EXAMINATION:
Vital Signs Last 24 Hrs  T(F): 98.3 (11 Apr 2024 10:58), Max: 98.5 (10 Apr 2024 21:00)  HR: 76 (11 Apr 2024 10:58) (60 - 76)  BP: 136/74 (11 Apr 2024 10:58) (103/63 - 161/72)  RR: 17 (11 Apr 2024 10:58) (17 - 18)  SpO2: 97% (11 Apr 2024 10:58) (93% - 97%)    Physical Exam:  Constitutional: NAD, awake and alert  Respiratory: cta b/l no wheezing no rhonchi  Cardiovascular: +s1/s2 no edema b/l le  Gastrointestinal: soft nt nd bs+  Vascular: 2+ peripheral pulses  Neurological: A/O x 3, no focal deficits

## 2024-04-11 NOTE — PROGRESS NOTE ADULT - SUBJECTIVE AND OBJECTIVE BOX
Patient is a 83y old  Female who presents with a chief complaint of Acute CVA (10 Apr 2024 12:04)    INTERVAL HPI/OVERNIGHT EVENTS: No acute events overnight. HD stable.     MEDICATIONS  (STANDING):  aspirin  chewable 81 milliGRAM(s) Oral daily  atorvastatin 40 milliGRAM(s) Oral at bedtime  dextrose 10% Bolus 125 milliLiter(s) IV Bolus once  dextrose 5%. 1000 milliLiter(s) (50 mL/Hr) IV Continuous <Continuous>  dextrose 5%. 1000 milliLiter(s) (100 mL/Hr) IV Continuous <Continuous>  dextrose 50% Injectable 12.5 Gram(s) IV Push once  dextrose 50% Injectable 25 Gram(s) IV Push once  enoxaparin Injectable 40 milliGRAM(s) SubCutaneous every 24 hours  gabapentin 300 milliGRAM(s) Oral at bedtime  glucagon  Injectable 1 milliGRAM(s) IntraMuscular once  insulin glargine Injectable (LANTUS) 5 Unit(s) SubCutaneous at bedtime  insulin lispro (ADMELOG) corrective regimen sliding scale   SubCutaneous three times a day before meals  insulin lispro (ADMELOG) corrective regimen sliding scale   SubCutaneous at bedtime  insulin lispro Injectable (ADMELOG) 3 Unit(s) SubCutaneous three times a day before meals  OLANZapine 2.5 milliGRAM(s) Oral daily  OLANZapine 5 milliGRAM(s) Oral every 24 hours  sodium chloride 0.9% Bolus 500 milliLiter(s) IV Bolus once    MEDICATIONS  (PRN):  acetaminophen     Tablet .. 650 milliGRAM(s) Oral every 6 hours PRN Temp greater or equal to 38C (100.4F), Mild Pain (1 - 3)  aluminum hydroxide/magnesium hydroxide/simethicone Suspension 30 milliLiter(s) Oral every 4 hours PRN Dyspepsia  dextrose Oral Gel 15 Gram(s) Oral once PRN Blood Glucose LESS THAN 70 milliGRAM(s)/deciliter  melatonin 3 milliGRAM(s) Oral at bedtime PRN Insomnia  ondansetron Injectable 4 milliGRAM(s) IV Push every 8 hours PRN Nausea and/or Vomiting      Allergies    No Known Allergies    Intolerances        REVIEW OF SYSTEMS: all negative with exception of above    Vital Signs Last 24 Hrs  T(C): 36.8 (10 Apr 2024 10:53), Max: 36.8 (10 Apr 2024 10:53)  T(F): 98.3 (10 Apr 2024 10:53), Max: 98.3 (10 Apr 2024 10:53)  HR: 66 (10 Apr 2024 12:00) (66 - 95)  BP: 136/76 (10 Apr 2024 12:00) (125/63 - 161/95)  BP(mean): 104 (09 Apr 2024 16:05) (104 - 104)  RR: 18 (10 Apr 2024 12:00) (17 - 18)  SpO2: 99% (10 Apr 2024 12:00) (91% - 99%)    Parameters below as of 10 Apr 2024 12:00  Patient On (Oxygen Delivery Method): room air    PHYSICAL EXAM:  GENERAL: NAD, well-groomed  NERVOUS SYSTEM:  Alert & Oriented X3, Good concentration; Motor Strength 5/5 B/L upper and lower extremities; DTRs 2+ intact and symmetric  CHEST/LUNG: Clear to percussion bilaterally; No rales, rhonchi, wheezing, or rubs  HEART: Regular rate and rhythm; No murmurs, rubs, or gallops  ABDOMEN: Soft, Nontender, Nondistended; Bowel sounds present  EXTREMITIES:  2+ Peripheral Pulses, No clubbing, cyanosis, or edema      LABS:              CAPILLARY BLOOD GLUCOSE      POCT Blood Glucose.: 205 mg/dL (10 Apr 2024 11:31)  POCT Blood Glucose.: 204 mg/dL (10 Apr 2024 07:33)  POCT Blood Glucose.: 322 mg/dL (09 Apr 2024 21:27)  POCT Blood Glucose.: 265 mg/dL (09 Apr 2024 17:22)      RADIOLOGY & ADDITIONAL TESTS:    Imaging Personally Reviewed:  [ ] YES  [ ] NO    Consultant(s) Notes Reviewed:  [ ] YES  [ ] NO    Care Discussed with Consultants/Other Providers [ ] YES  [ ] NO
Patient seen and examined this am. No new events    MEDICATIONS:    acetaminophen     Tablet .. 650 milliGRAM(s) Oral every 6 hours PRN  aluminum hydroxide/magnesium hydroxide/simethicone Suspension 30 milliLiter(s) Oral every 4 hours PRN  aspirin  chewable 81 milliGRAM(s) Oral daily  atorvastatin 40 milliGRAM(s) Oral at bedtime  dextrose 10% Bolus 125 milliLiter(s) IV Bolus once  dextrose 5%. 1000 milliLiter(s) IV Continuous <Continuous>  dextrose 5%. 1000 milliLiter(s) IV Continuous <Continuous>  dextrose 50% Injectable 12.5 Gram(s) IV Push once  dextrose 50% Injectable 25 Gram(s) IV Push once  dextrose Oral Gel 15 Gram(s) Oral once PRN  gabapentin 300 milliGRAM(s) Oral at bedtime  glucagon  Injectable 1 milliGRAM(s) IntraMuscular once  insulin glargine Injectable (LANTUS) 5 Unit(s) SubCutaneous at bedtime  insulin lispro (ADMELOG) corrective regimen sliding scale   SubCutaneous at bedtime  insulin lispro (ADMELOG) corrective regimen sliding scale   SubCutaneous three times a day before meals  insulin lispro Injectable (ADMELOG) 3 Unit(s) SubCutaneous three times a day before meals  melatonin 3 milliGRAM(s) Oral at bedtime PRN  OLANZapine 2.5 milliGRAM(s) Oral daily  OLANZapine 5 milliGRAM(s) Oral every 24 hours  ondansetron Injectable 4 milliGRAM(s) IV Push every 8 hours PRN  sodium chloride 0.9% Bolus 500 milliLiter(s) IV Bolus once      LABS:                          11.4   5.23  )-----------( 154      ( 08 Apr 2024 07:50 )             35.3     04-08    140  |  108  |  15  ----------------------------<  200<H>  4.1   |  26  |  0.94    Ca    8.8      08 Apr 2024 07:50      CAPILLARY BLOOD GLUCOSE      POCT Blood Glucose.: 375 mg/dL (09 Apr 2024 11:34)  POCT Blood Glucose.: 249 mg/dL (09 Apr 2024 08:18)  POCT Blood Glucose.: 247 mg/dL (08 Apr 2024 21:32)  POCT Blood Glucose.: 360 mg/dL (08 Apr 2024 16:47)      Urinalysis Basic - ( 08 Apr 2024 07:50 )    Color: x / Appearance: x / SG: x / pH: x  Gluc: 200 mg/dL / Ketone: x  / Bili: x / Urobili: x   Blood: x / Protein: x / Nitrite: x   Leuk Esterase: x / RBC: x / WBC x   Sq Epi: x / Non Sq Epi: x / Bacteria: x      I&O's Summary    09 Apr 2024 07:01  -  09 Apr 2024 12:54  --------------------------------------------------------  IN: 177 mL / OUT: 0 mL / NET: 177 mL      Vital Signs Last 24 Hrs  T(C): 36.3 (09 Apr 2024 10:35), Max: 37.4 (08 Apr 2024 23:57)  T(F): 97.3 (09 Apr 2024 10:35), Max: 99.3 (08 Apr 2024 23:57)  HR: 73 (09 Apr 2024 11:00) (52 - 82)  BP: 153/74 (09 Apr 2024 11:00) (113/72 - 177/80)  BP(mean): 106 (08 Apr 2024 16:18) (106 - 106)  RR: 18 (09 Apr 2024 10:35) (17 - 18)  SpO2: 96% (09 Apr 2024 11:00) (95% - 99%)    Parameters below as of 09 Apr 2024 11:00  Patient On (Oxygen Delivery Method): room air          On Neurological Examination:  Neuro: lethargic moans  to painful stimuli does not follow commands or answer questions     CN: PERRL, EOMI, could no assess vision right facial palsy,     Motor: withdraws L > R    Sensory: Intact to pain    FTN: could not asesss            GENERAL Exam:     Nontoxic , No Acute Distress   	  HEENT:  normocephalic, atraumatic  		  LUNGS:	Clear bilaterally  No Wheeze    	  HEART:	 Normal S1S2   No murmur RRR        	  GI/ ABDOMEN:  Soft  Non tender    EXTREMITIES:   No Edema  No Clubbing  No Cyanosis No Edema    MUSCULOSKELETAL: Normal Range of Motion  	   SKIN:      Normal   No Ecchymosis               RADIOLOGY  < from: CT Angio Head w/ IV Cont (04.06.24 @ 16:46) >    CT HEAD:  Lacunar infarct at left superior thalamus, new from June 2022 and could   explain right sided weakness. Chronic left PCA infarct unchanged. No   acute intracranial hemorrhage or mass effect.    Case discussed with Dr. Alejandra at 4:37 PM on 4/6/24.      CT FACE: No fracture, intraorbital hemorrhage or radiopaque foreign body.      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 This is new from a 2020 MRA, but probably   chronic in the setting of chronic left PCA infarct stable from 2022. No   vertebrobasilar stenosis.    Anterior circulation: No occlusion, but moderate-severe sites of carotid   and JOHN PAUL origin stenosis appear progressed from 2020 MRA allowing for   technical differences.      CTA NECK:  No arterial steno-occlusive disease or evidence of dissection.    Right thyroid nodular/goitrous enlargement, with tracheal shift and   borderline substernal extent.    --- End of Report ---      < end of copied text >          < from: MR Head No Cont (04.07.24 @ 15:19) >      IMPRESSION:    DWI-positive recent infarct at left thalamus, and acute-on-chronic at   left occipital lobe in setting of PCA occlusion.    --- End of Report ---      < end of copied text >  < from: TTE Echo Complete w/o Contrast w/ Doppler (04.08.24 @ 09:21) >    Summary:   1. Left ventricular ejection fraction, by visual estimation, is 55 to   60%.   2. No evidence of mitral valve regurgitation.   3. Color flow doppler and intravenous injection of agitated saline   demonstrates the presence of an intact intra atrial septum.      [id number] Ethan Rosenberg MD, FACC  Electronically signed on 4/8/2024 at 5:37:58 PM    < end of copied text >        
patient seen and examined    for echo , MRI and US thyroid  Bradycardic    ROS: very limited    Objective:  Vitals  T(C): 36.1 (04-07-24 @ 12:26), Max: 36.9 (04-06-24 @ 20:19)  HR: 49 (04-07-24 @ 12:26) (49 - 86)  BP: 116/50 (04-07-24 @ 12:26) (98/59 - 191/75)  RR: 19 (04-07-24 @ 12:26) (12 - 19)  SpO2: 96% (04-07-24 @ 12:26) (96% - 99%)    Physical Exam:  General: comfortable, no acute distress  HEENT: Atraumatic, no LAD, trachea midline, PERRLA  Cardiovascular: normal s1s2, no murmurs, gallops or fricition rubs  Pulmonary: clear to ausculation Bilaterally, no wheezing , rhonchi  Gastrointestinal: soft non tender non distended, no masses felt, no organomegally  Muscloskeletal: no lower extremity edema, intact bilateral lower extremity pulses  Neurological: CN II-12 intact. No focal weakness  Psychiatrical: normal mood, cooperative  SKIN: no rash, lesions or ulcers    Labs:                          12.2   7.73  )-----------( 190      ( 06 Apr 2024 16:53 )             38.1     04-06    136  |  101  |  17  ----------------------------<  343<H>  4.3   |  28  |  1.18    Ca    9.4      06 Apr 2024 16:53  Mg     2.0     04-07    TPro  8.3  /  Alb  3.8  /  TBili  0.7  /  DBili  x   /  AST  23  /  ALT  24  /  AlkPhos  128<H>  04-06    LIVER FUNCTIONS - ( 06 Apr 2024 16:53 )  Alb: 3.8 g/dL / Pro: 8.3 gm/dL / ALK PHOS: 128 U/L / ALT: 24 U/L / AST: 23 U/L / GGT: x           PT/INR - ( 06 Apr 2024 16:53 )   PT: 10.5 sec;   INR: 0.88 ratio         PTT - ( 06 Apr 2024 16:53 )  PTT:32.1 sec      Active Medications  MEDICATIONS  (STANDING):  aspirin  chewable 81 milliGRAM(s) Oral daily  atorvastatin 40 milliGRAM(s) Oral at bedtime  cefTRIAXone   IVPB 1000 milliGRAM(s) IV Intermittent every 24 hours  dextrose 10% Bolus 125 milliLiter(s) IV Bolus once  dextrose 5%. 1000 milliLiter(s) (50 mL/Hr) IV Continuous <Continuous>  dextrose 5%. 1000 milliLiter(s) (100 mL/Hr) IV Continuous <Continuous>  dextrose 50% Injectable 12.5 Gram(s) IV Push once  dextrose 50% Injectable 25 Gram(s) IV Push once  gabapentin 300 milliGRAM(s) Oral at bedtime  glucagon  Injectable 1 milliGRAM(s) IntraMuscular once  insulin lispro (ADMELOG) corrective regimen sliding scale   SubCutaneous three times a day before meals  insulin lispro (ADMELOG) corrective regimen sliding scale   SubCutaneous at bedtime  OLANZapine 2.5 milliGRAM(s) Oral daily  OLANZapine 5 milliGRAM(s) Oral at bedtime  sodium chloride 0.9% Bolus 500 milliLiter(s) IV Bolus once    MEDICATIONS  (PRN):  acetaminophen     Tablet .. 650 milliGRAM(s) Oral every 6 hours PRN Temp greater or equal to 38C (100.4F), Mild Pain (1 - 3)  aluminum hydroxide/magnesium hydroxide/simethicone Suspension 30 milliLiter(s) Oral every 4 hours PRN Dyspepsia  dextrose Oral Gel 15 Gram(s) Oral once PRN Blood Glucose LESS THAN 70 milliGRAM(s)/deciliter  melatonin 3 milliGRAM(s) Oral at bedtime PRN Insomnia  ondansetron Injectable 4 milliGRAM(s) IV Push every 8 hours PRN Nausea and/or Vomiting    
CHIEF COMPLAINT/INTERVAL HISTORY:    Patient is a 83y old  Female who presents with a chief complaint of Acute CVA (07 Apr 2024 13:23)      HPI:  Estella Eckert is an 83 year old female with PMHx of HTN, HLD, NIDDM2, R. eye glaucoma, and dementia who presented to the ED on 4/6/24 for complaints of right sided weakness.    History primarily obtained from daughter at bedside as patient is unable to provide history due to dementia. Patient reports she is going to the [facility] so she cannot stay in the hospital. As per daughter at bedside, patient has had five falls at home since yesterday. Around 1:30 PM yesterday, daughter was about to leave patient's house when she noticed her having slurred speech while eating lunch. She could not hold her fork in her right hand and she noticed patient holding onto right arm with her left hand. Daughter left for five minutes and then returned. States speech was resolved and patient was no longer holding onto her right arm so she assumed symptoms were very transient. There are cameras in patient's room and she was noted to be on the floor so when daughter went to assist her off the floor, she noticed patient could not stand herself up and was attempting to hold onto furniture to get up. Baseline functional status is ambulates unassisted and independent with all ADLs. Given this, daughter brought her to the ER for further evaluation. Lives in multi-level home on the second floor with grandsons. Her son lives on the first floor of house.    In the ED, VSS except BP as elevated as 191/75. Labs grossly unremarkable except blood glucose 343, alkphos 128, mag 1.5, and lactic acid 2.7. U/A with positive nitrites, small leuks, glucosuria. CT head with lacunar infarct at left superior thalamus. CT maxillofacial without fracture. CTA head with left PCA occlusion at origin. CT neck without arterial steno-occlusive disease or evidence of dissection but with right thyroid nodular/goitrous enlargement, with tracheal shift and borderline substernal extent. Received  cc bolus and  mg. (06 Apr 2024 21:17)      SUBJECTIVE & OBJECTIVE: Pt seen and examined at bedside.   confused, poor historian       Vital Signs Last 24 Hrs  T(C): 36.5 (08 Apr 2024 04:42), Max: 36.7 (07 Apr 2024 16:58)  T(F): 97.7 (08 Apr 2024 04:42), Max: 98.1 (07 Apr 2024 16:58)  HR: 60 (08 Apr 2024 07:00) (60 - 88)  BP: 140/91 (08 Apr 2024 04:42) (140/91 - 170/81)  BP(mean): --  ABP: --  ABP(mean): --  RR: 17 (08 Apr 2024 04:42) (17 - 17)  SpO2: 100% (08 Apr 2024 04:42) (95% - 100%)    O2 Parameters below as of 08 Apr 2024 04:42  Patient On (Oxygen Delivery Method): room air              MEDICATIONS  (STANDING):  aspirin  chewable 81 milliGRAM(s) Oral daily  atorvastatin 40 milliGRAM(s) Oral at bedtime  cefTRIAXone   IVPB 1000 milliGRAM(s) IV Intermittent every 24 hours  dextrose 10% Bolus 125 milliLiter(s) IV Bolus once  dextrose 5%. 1000 milliLiter(s) (50 mL/Hr) IV Continuous <Continuous>  dextrose 5%. 1000 milliLiter(s) (100 mL/Hr) IV Continuous <Continuous>  dextrose 50% Injectable 12.5 Gram(s) IV Push once  dextrose 50% Injectable 25 Gram(s) IV Push once  gabapentin 300 milliGRAM(s) Oral at bedtime  glucagon  Injectable 1 milliGRAM(s) IntraMuscular once  insulin lispro (ADMELOG) corrective regimen sliding scale   SubCutaneous three times a day before meals  insulin lispro (ADMELOG) corrective regimen sliding scale   SubCutaneous at bedtime  OLANZapine 2.5 milliGRAM(s) Oral daily  OLANZapine 5 milliGRAM(s) Oral at bedtime  sodium chloride 0.9% Bolus 500 milliLiter(s) IV Bolus once    MEDICATIONS  (PRN):  acetaminophen     Tablet .. 650 milliGRAM(s) Oral every 6 hours PRN Temp greater or equal to 38C (100.4F), Mild Pain (1 - 3)  aluminum hydroxide/magnesium hydroxide/simethicone Suspension 30 milliLiter(s) Oral every 4 hours PRN Dyspepsia  dextrose Oral Gel 15 Gram(s) Oral once PRN Blood Glucose LESS THAN 70 milliGRAM(s)/deciliter  melatonin 3 milliGRAM(s) Oral at bedtime PRN Insomnia  ondansetron Injectable 4 milliGRAM(s) IV Push every 8 hours PRN Nausea and/or Vomiting        PHYSICAL EXAM:    GENERAL: NAD, well-developed  HEAD:  Atraumatic, Normocephalic  EYES: EOMI, PERRLA, conjunctiva and sclera clear  ENMT: Moist mucous membranes  NECK: Supple  NERVOUS SYSTEM:  Alert & Oriented X 1, forgetful, confused  CHEST/LUNG: Clear to auscultation bilaterally; No rales, rhonchi, wheezing, or rubs  HEART:  S1, S2  ABDOMEN: Soft, Nontender,  Bowel sounds present  EXTREMITIES:  no cyanosis, or edema    LABS:                        11.4   5.23  )-----------( 154      ( 08 Apr 2024 07:50 )             35.3     04-08    140  |  108  |  15  ----------------------------<  200<H>  4.1   |  26  |  0.94    Ca    8.8      08 Apr 2024 07:50  Mg     2.0     04-07    TPro  8.3  /  Alb  3.8  /  TBili  0.7  /  DBili  x   /  AST  23  /  ALT  24  /  AlkPhos  128<H>  04-06    PT/INR - ( 06 Apr 2024 16:53 )   PT: 10.5 sec;   INR: 0.88 ratio         PTT - ( 06 Apr 2024 16:53 )  PTT:32.1 sec  Urinalysis Basic - ( 08 Apr 2024 07:50 )    Color: x / Appearance: x / SG: x / pH: x  Gluc: 200 mg/dL / Ketone: x  / Bili: x / Urobili: x   Blood: x / Protein: x / Nitrite: x   Leuk Esterase: x / RBC: x / WBC x   Sq Epi: x / Non Sq Epi: x / Bacteria: x        CAPILLARY BLOOD GLUCOSE      POCT Blood Glucose.: 191 mg/dL (08 Apr 2024 11:07)  POCT Blood Glucose.: 196 mg/dL (08 Apr 2024 07:43)  POCT Blood Glucose.: 264 mg/dL (07 Apr 2024 21:54)  POCT Blood Glucose.: 155 mg/dL (07 Apr 2024 16:16)    < from: MR Head No Cont (04.07.24 @ 15:19) >    IMPRESSION:    DWI-positive recent infarct at left thalamus, and acute-on-chronic at   left occipital lobe in setting of PCA occlusion.    --- End of Report ---            DESI DUFF MD; Attending Radiologist  This document has been electronically signed. Apr 7 2024  3:27PM    < end of copied text >    < from: Xray Hip w/ Pelvis 2 or 3 Views, Right (04.06.24 @ 21:30) >    ACC: [id number] EXAM:  XR HIP WITH PELV 2-3V RT   ORDERED BY: ANNE MARIE LAKHANI     PROCEDURE DATE:  04/06/2024          INTERPRETATION:  History: Fall, right leg limp and weakness.    FINDINGS:    Frontal pelvis.  Frontal and abducted right hip:    COMPARISON: 9/25/2022    No pelvic fracture appreciated. Excreting contrast seen filling the   urinary bladder.    No hip fracture or dislocation appreciated.    IMPRESSION:    No definite fracture.    If patient has limited ability to bear weight, would recommend   correlation with CT scan.    --- End of Report ---            SOLA NOVAK MD; Attending Radiologist  This document has been electronically signed. Apr 7 2024  8:26AM    < end of copied text >      
Patient is a 83y old  Female who presents with a chief complaint of Acute CVA (09 Apr 2024 11:21)    INTERVAL HPI/OVERNIGHT EVENTS: No acute events overnight. HD stable.     MEDICATIONS  (STANDING):  aspirin  chewable 81 milliGRAM(s) Oral daily  atorvastatin 40 milliGRAM(s) Oral at bedtime  dextrose 10% Bolus 125 milliLiter(s) IV Bolus once  dextrose 5%. 1000 milliLiter(s) (50 mL/Hr) IV Continuous <Continuous>  dextrose 5%. 1000 milliLiter(s) (100 mL/Hr) IV Continuous <Continuous>  dextrose 50% Injectable 12.5 Gram(s) IV Push once  dextrose 50% Injectable 25 Gram(s) IV Push once  enoxaparin Injectable 40 milliGRAM(s) SubCutaneous every 24 hours  gabapentin 300 milliGRAM(s) Oral at bedtime  glucagon  Injectable 1 milliGRAM(s) IntraMuscular once  insulin glargine Injectable (LANTUS) 5 Unit(s) SubCutaneous at bedtime  insulin lispro (ADMELOG) corrective regimen sliding scale   SubCutaneous three times a day before meals  insulin lispro (ADMELOG) corrective regimen sliding scale   SubCutaneous at bedtime  insulin lispro Injectable (ADMELOG) 3 Unit(s) SubCutaneous three times a day before meals  OLANZapine 2.5 milliGRAM(s) Oral daily  OLANZapine 5 milliGRAM(s) Oral every 24 hours  sodium chloride 0.9% Bolus 500 milliLiter(s) IV Bolus once    MEDICATIONS  (PRN):  acetaminophen     Tablet .. 650 milliGRAM(s) Oral every 6 hours PRN Temp greater or equal to 38C (100.4F), Mild Pain (1 - 3)  aluminum hydroxide/magnesium hydroxide/simethicone Suspension 30 milliLiter(s) Oral every 4 hours PRN Dyspepsia  dextrose Oral Gel 15 Gram(s) Oral once PRN Blood Glucose LESS THAN 70 milliGRAM(s)/deciliter  melatonin 3 milliGRAM(s) Oral at bedtime PRN Insomnia  ondansetron Injectable 4 milliGRAM(s) IV Push every 8 hours PRN Nausea and/or Vomiting      Allergies    No Known Allergies    Intolerances        REVIEW OF SYSTEMS: all negative with exception of above    Vital Signs Last 24 Hrs  T(C): 36.3 (09 Apr 2024 10:35), Max: 37.4 (08 Apr 2024 23:57)  T(F): 97.3 (09 Apr 2024 10:35), Max: 99.3 (08 Apr 2024 23:57)  HR: 73 (09 Apr 2024 11:00) (52 - 82)  BP: 153/74 (09 Apr 2024 11:00) (113/72 - 177/80)  BP(mean): 106 (08 Apr 2024 16:18) (106 - 106)  RR: 18 (09 Apr 2024 10:35) (17 - 18)  SpO2: 96% (09 Apr 2024 11:00) (95% - 99%)    Parameters below as of 09 Apr 2024 11:00  Patient On (Oxygen Delivery Method): room air        PHYSICAL EXAM:  GENERAL: NAD, well-groomed, well-developed  HEAD:  Atraumatic, Normocephalic  EYES: EOMI, PERRLA, conjunctiva and sclera clear  ENMT: No tonsillar erythema, exudates, or enlargement; Moist mucous membranes, Good dentition, No lesions  NECK: Supple, No JVD, Normal thyroid  NERVOUS SYSTEM:  Alert & Oriented X3, Good concentration; Motor Strength 5/5 B/L upper and lower extremities; DTRs 2+ intact and symmetric  CHEST/LUNG: Clear to percussion bilaterally; No rales, rhonchi, wheezing, or rubs  HEART: Regular rate and rhythm; No murmurs, rubs, or gallops  ABDOMEN: Soft, Nontender, Nondistended; Bowel sounds present  EXTREMITIES:  2+ Peripheral Pulses, No clubbing, cyanosis, or edema  LYMPH: No lymphadenopathy noted  SKIN: No rashes or lesions    LABS:                        11.4   5.23  )-----------( 154      ( 08 Apr 2024 07:50 )             35.3     04-08    140  |  108  |  15  ----------------------------<  200<H>  4.1   |  26  |  0.94    Ca    8.8      08 Apr 2024 07:50        Urinalysis Basic - ( 08 Apr 2024 07:50 )    Color: x / Appearance: x / SG: x / pH: x  Gluc: 200 mg/dL / Ketone: x  / Bili: x / Urobili: x   Blood: x / Protein: x / Nitrite: x   Leuk Esterase: x / RBC: x / WBC x   Sq Epi: x / Non Sq Epi: x / Bacteria: x      CAPILLARY BLOOD GLUCOSE      POCT Blood Glucose.: 375 mg/dL (09 Apr 2024 11:34)  POCT Blood Glucose.: 249 mg/dL (09 Apr 2024 08:18)  POCT Blood Glucose.: 247 mg/dL (08 Apr 2024 21:32)  POCT Blood Glucose.: 360 mg/dL (08 Apr 2024 16:47)      RADIOLOGY & ADDITIONAL TESTS:    Imaging Personally Reviewed:  [ ] YES  [ ] NO    Consultant(s) Notes Reviewed:  [ ] YES  [ ] NO    Care Discussed with Consultants/Other Providers [ ] YES  [ ] NO
Patient seen and examined this am. No new events    MEDICATIONS:    acetaminophen     Tablet .. 650 milliGRAM(s) Oral every 6 hours PRN  aluminum hydroxide/magnesium hydroxide/simethicone Suspension 30 milliLiter(s) Oral every 4 hours PRN  aspirin  chewable 81 milliGRAM(s) Oral daily  atorvastatin 40 milliGRAM(s) Oral at bedtime  dextrose 10% Bolus 125 milliLiter(s) IV Bolus once  dextrose 5%. 1000 milliLiter(s) IV Continuous <Continuous>  dextrose 5%. 1000 milliLiter(s) IV Continuous <Continuous>  dextrose 50% Injectable 12.5 Gram(s) IV Push once  dextrose 50% Injectable 25 Gram(s) IV Push once  dextrose Oral Gel 15 Gram(s) Oral once PRN  enoxaparin Injectable 40 milliGRAM(s) SubCutaneous every 24 hours  gabapentin 300 milliGRAM(s) Oral at bedtime  glucagon  Injectable 1 milliGRAM(s) IntraMuscular once  insulin glargine Injectable (LANTUS) 5 Unit(s) SubCutaneous at bedtime  insulin lispro (ADMELOG) corrective regimen sliding scale   SubCutaneous at bedtime  insulin lispro (ADMELOG) corrective regimen sliding scale   SubCutaneous three times a day before meals  insulin lispro Injectable (ADMELOG) 3 Unit(s) SubCutaneous three times a day before meals  melatonin 3 milliGRAM(s) Oral at bedtime PRN  OLANZapine 2.5 milliGRAM(s) Oral daily  OLANZapine 5 milliGRAM(s) Oral every 24 hours  ondansetron Injectable 4 milliGRAM(s) IV Push every 8 hours PRN  sodium chloride 0.9% Bolus 500 milliLiter(s) IV Bolus once      LABS:            CAPILLARY BLOOD GLUCOSE      POCT Blood Glucose.: 205 mg/dL (10 Apr 2024 11:31)  POCT Blood Glucose.: 204 mg/dL (10 Apr 2024 07:33)  POCT Blood Glucose.: 322 mg/dL (09 Apr 2024 21:27)  POCT Blood Glucose.: 265 mg/dL (09 Apr 2024 17:22)        I&O's Summary    09 Apr 2024 07:01  -  10 Apr 2024 07:00  --------------------------------------------------------  IN: 177 mL / OUT: 200 mL / NET: -23 mL    10 Apr 2024 07:01  -  10 Apr 2024 12:04  --------------------------------------------------------  IN: 300 mL / OUT: 0 mL / NET: 300 mL      Vital Signs Last 24 Hrs  T(C): 36.8 (10 Apr 2024 10:53), Max: 36.8 (10 Apr 2024 10:53)  T(F): 98.3 (10 Apr 2024 10:53), Max: 98.3 (10 Apr 2024 10:53)  HR: 68 (10 Apr 2024 10:53) (68 - 95)  BP: 125/63 (10 Apr 2024 10:53) (125/63 - 161/95)  BP(mean): 104 (09 Apr 2024 16:05) (104 - 104)  RR: 18 (10 Apr 2024 10:53) (17 - 18)  SpO2: 97% (10 Apr 2024 10:53) (91% - 97%)    Parameters below as of 10 Apr 2024 10:53  Patient On (Oxygen Delivery Method): room air            On Neurological Examination:  Neuro: eyes open says name  follows some commands     CN: PERRL, EOMI, could no assess vision right facial palsy,     Motor: Moves L > R    Sensory: Intact to pain    FTN: could not asesss            GENERAL Exam:     Nontoxic , No Acute Distress   	  HEENT:  normocephalic, atraumatic  		  LUNGS:	Clear bilaterally  No Wheeze    	  HEART:	 Normal S1S2   No murmur RRR        	  GI/ ABDOMEN:  Soft  Non tender    EXTREMITIES:   No Edema  No Clubbing  No Cyanosis No Edema    MUSCULOSKELETAL: Normal Range of Motion  	   SKIN:      Normal   No Ecchymosis               RADIOLOGY  < from: CT Angio Head w/ IV Cont (04.06.24 @ 16:46) >    CT HEAD:  Lacunar infarct at left superior thalamus, new from June 2022 and could   explain right sided weakness. Chronic left PCA infarct unchanged. No   acute intracranial hemorrhage or mass effect.    Case discussed with Dr. Alejandra at 4:37 PM on 4/6/24.      CT FACE: No fracture, intraorbital hemorrhage or radiopaque foreign body.      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 This is new from a 2020 MRA, but probably   chronic in the setting of chronic left PCA infarct stable from 2022. No   vertebrobasilar stenosis.    Anterior circulation: No occlusion, but moderate-severe sites of carotid   and JOHN PAUL origin stenosis appear progressed from 2020 MRA allowing for   technical differences.      CTA NECK:  No arterial steno-occlusive disease or evidence of dissection.    Right thyroid nodular/goitrous enlargement, with tracheal shift and   borderline substernal extent.    --- End of Report ---      < end of copied text >          < from: MR Head No Cont (04.07.24 @ 15:19) >      IMPRESSION:    DWI-positive recent infarct at left thalamus, and acute-on-chronic at   left occipital lobe in setting of PCA occlusion.    --- End of Report ---      < end of copied text >  < from: TTE Echo Complete w/o Contrast w/ Doppler (04.08.24 @ 09:21) >    Summary:   1. Left ventricular ejection fraction, by visual estimation, is 55 to   60%.   2. No evidence of mitral valve regurgitation.   3. Color flow doppler and intravenous injection of agitated saline   demonstrates the presence of an intact intra atrial septum.      [id number] Ethan Rosenberg MD, FACC  Electronically signed on 4/8/2024 at 5:37:58 PM    < end of copied text >        
Patient seen and examined this am. No new events    MEDICATIONS:    acetaminophen     Tablet .. 650 milliGRAM(s) Oral every 6 hours PRN  aluminum hydroxide/magnesium hydroxide/simethicone Suspension 30 milliLiter(s) Oral every 4 hours PRN  aspirin  chewable 81 milliGRAM(s) Oral daily  atorvastatin 40 milliGRAM(s) Oral at bedtime  dextrose 10% Bolus 125 milliLiter(s) IV Bolus once  dextrose 5%. 1000 milliLiter(s) IV Continuous <Continuous>  dextrose 5%. 1000 milliLiter(s) IV Continuous <Continuous>  dextrose 50% Injectable 12.5 Gram(s) IV Push once  dextrose 50% Injectable 25 Gram(s) IV Push once  dextrose Oral Gel 15 Gram(s) Oral once PRN  enoxaparin Injectable 40 milliGRAM(s) SubCutaneous every 24 hours  gabapentin 300 milliGRAM(s) Oral at bedtime  glucagon  Injectable 1 milliGRAM(s) IntraMuscular once  insulin glargine Injectable (LANTUS) 5 Unit(s) SubCutaneous at bedtime  insulin lispro (ADMELOG) corrective regimen sliding scale   SubCutaneous three times a day before meals  insulin lispro (ADMELOG) corrective regimen sliding scale   SubCutaneous at bedtime  insulin lispro Injectable (ADMELOG) 3 Unit(s) SubCutaneous three times a day before meals  melatonin 3 milliGRAM(s) Oral at bedtime PRN  OLANZapine 2.5 milliGRAM(s) Oral daily  OLANZapine 5 milliGRAM(s) Oral every 24 hours  ondansetron Injectable 4 milliGRAM(s) IV Push every 8 hours PRN      LABS:            CAPILLARY BLOOD GLUCOSE      POCT Blood Glucose.: 242 mg/dL (11 Apr 2024 11:12)  POCT Blood Glucose.: 170 mg/dL (11 Apr 2024 07:36)  POCT Blood Glucose.: 192 mg/dL (10 Apr 2024 21:05)  POCT Blood Glucose.: 171 mg/dL (10 Apr 2024 16:05)  POCT Blood Glucose.: 205 mg/dL (10 Apr 2024 11:31)        I&O's Summary    10 Apr 2024 07:01  -  11 Apr 2024 07:00  --------------------------------------------------------  IN: 700 mL / OUT: 0 mL / NET: 700 mL      Vital Signs Last 24 Hrs  T(C): 36.8 (11 Apr 2024 10:58), Max: 36.9 (10 Apr 2024 21:00)  T(F): 98.3 (11 Apr 2024 10:58), Max: 98.5 (10 Apr 2024 21:00)  HR: 76 (11 Apr 2024 10:58) (60 - 76)  BP: 136/74 (11 Apr 2024 10:58) (103/63 - 161/72)  BP(mean): 75 (10 Apr 2024 16:54) (75 - 75)  RR: 17 (11 Apr 2024 10:58) (17 - 18)  SpO2: 97% (11 Apr 2024 10:58) (93% - 99%)    Parameters below as of 11 Apr 2024 10:58  Patient On (Oxygen Delivery Method): room air        On Neurological Examination:  Neuro: eyes open says name  follows some commands     CN: PERRL, EOMI, right peripheral visual defiicts right facial palsy,     Motor: RUE and RLE drift    Sensory: Intact to pain    FTN: grossly intact            GENERAL Exam:     Nontoxic , No Acute Distress   	  HEENT:  normocephalic, atraumatic  		  LUNGS:	Clear bilaterally  No Wheeze    	  HEART:	 Normal S1S2   No murmur RRR        	  GI/ ABDOMEN:  Soft  Non tender    EXTREMITIES:   No Edema  No Clubbing  No Cyanosis No Edema    MUSCULOSKELETAL: Normal Range of Motion  	   SKIN:      Normal   No Ecchymosis               RADIOLOGY  < from: CT Angio Head w/ IV Cont (04.06.24 @ 16:46) >    CT HEAD:  Lacunar infarct at left superior thalamus, new from June 2022 and could   explain right sided weakness. Chronic left PCA infarct unchanged. No   acute intracranial hemorrhage or mass effect.    Case discussed with Dr. Alejandra at 4:37 PM on 4/6/24.      CT FACE: No fracture, intraorbital hemorrhage or radiopaque foreign body.      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 This is new from a 2020 MRA, but probably   chronic in the setting of chronic left PCA infarct stable from 2022. No   vertebrobasilar stenosis.    Anterior circulation: No occlusion, but moderate-severe sites of carotid   and JOHN PAUL origin stenosis appear progressed from 2020 MRA allowing for   technical differences.      CTA NECK:  No arterial steno-occlusive disease or evidence of dissection.    Right thyroid nodular/goitrous enlargement, with tracheal shift and   borderline substernal extent.    --- End of Report ---      < end of copied text >          < from: MR Head No Cont (04.07.24 @ 15:19) >      IMPRESSION:    DWI-positive recent infarct at left thalamus, and acute-on-chronic at   left occipital lobe in setting of PCA occlusion.    --- End of Report ---      < end of copied text >  < from: TTE Echo Complete w/o Contrast w/ Doppler (04.08.24 @ 09:21) >    Summary:   1. Left ventricular ejection fraction, by visual estimation, is 55 to   60%.   2. No evidence of mitral valve regurgitation.   3. Color flow doppler and intravenous injection of agitated saline   demonstrates the presence of an intact intra atrial septum.      [id number] Ethan Rosenberg MD, [facility]  Electronically signed on 4/8/2024 at 5:37:58 PM    < end of copied text >

## 2024-04-11 NOTE — DISCHARGE NOTE PROVIDER - NSDCFUSCHEDAPPT_GEN_ALL_CORE_FT
[facility] Physician [facility]  GERIATRICS [address] [place] [address]   Scheduled Appointment: 04/18/2024

## 2024-04-11 NOTE — DISCHARGE NOTE PROVIDER - HOSPITAL COURSE
82 y/o F w/ PMH of HTN, HLD, NIDDM2, R. eye glaucoma, and dementia who presented to the ED on 4/6/24 for complaints of right sided weakness and admitted for acute CVA.    Acute CVA  Daughter reports slurred speech and inability to hold fork with R. hand  no TNK given out of the window for thrombolytic therapy  CT head with lacunar infarct at left superior thalamus, CT maxillofacial without fracture  CTA head with L. PCA occlusion at origin, CT neck without arterial steno-occlusive disease or evidence of dissection  Neuro checks q4, diet started given passed bedside dysphagia screen  Neuro eval noted- check MRI and TTE  Outpt loop recorder  Follow echo  MRI Brain : >  recent infarct at left thalamus, and acute-on-chronic at left occipital lobe in setting of PCA occlusion  HbA1C 11.5  LDL 82  PT/OT eval     Recurrent falls secondary to above  Baseline ambulates unassisted  PT/OT consulted  - will need outpatient follow up with PCP for thyroid nodule.     HTN/HLD  c/w anti htnves  statin      UTI  UCx + for E.Coli  s/p CTX      R. thyroid enlargement, incidental finding  As seen on CTA neck  will need outpatient endo f/u    NIDDM2- uncontrolled-HbA1c elevated- Janumet on hodl at admit- lactic acidosis on admit may be sec to metformin  Monitor fsbs/ISS    Dementia: PTA olanzapine 2.5 mg q am and 5 mg qhs  fall precautions        PT recs rehab  STAble for discharge per attending

## 2024-04-11 NOTE — DISCHARGE NOTE NURSING/CASE MANAGEMENT/SOCIAL WORK - PATIENT PORTAL LINK FT
You can access the FollowMyHealth Patient Portal offered by [facility] by registering at the following website: [email or web address]. By joining [facility]’s FollowMyHealth portal, you will also be able to view your health information using other applications (apps) compatible with our system.

## 2024-04-17 DIAGNOSIS — R29.6 REPEATED FALLS: ICD-10-CM

## 2024-04-17 DIAGNOSIS — H40.9 UNSPECIFIED GLAUCOMA: ICD-10-CM

## 2024-04-17 DIAGNOSIS — T38.3X5A ADVERSE EFFECT OF INSULIN AND ORAL HYPOGLYCEMIC [ANTIDIABETIC] DRUGS, INITIAL ENCOUNTER: ICD-10-CM

## 2024-04-17 DIAGNOSIS — Z79.84 LONG TERM (CURRENT) USE OF ORAL HYPOGLYCEMIC DRUGS: ICD-10-CM

## 2024-04-17 DIAGNOSIS — E78.5 HYPERLIPIDEMIA, UNSPECIFIED: ICD-10-CM

## 2024-04-17 DIAGNOSIS — Z96.653 PRESENCE OF ARTIFICIAL KNEE JOINT, BILATERAL: ICD-10-CM

## 2024-04-17 DIAGNOSIS — I63.532 CEREBRAL INFARCTION DUE TO UNSPECIFIED OCCLUSION OR STENOSIS OF LEFT POSTERIOR CEREBRAL ARTERY: ICD-10-CM

## 2024-04-17 DIAGNOSIS — E83.42 HYPOMAGNESEMIA: ICD-10-CM

## 2024-04-17 DIAGNOSIS — F03.90 UNSPECIFIED DEMENTIA, UNSPECIFIED SEVERITY, WITHOUT BEHAVIORAL DISTURBANCE, PSYCHOTIC DISTURBANCE, MOOD DISTURBANCE, AND ANXIETY: ICD-10-CM

## 2024-04-17 DIAGNOSIS — E11.40 TYPE 2 DIABETES MELLITUS WITH DIABETIC NEUROPATHY, UNSPECIFIED: ICD-10-CM

## 2024-04-17 DIAGNOSIS — N39.0 URINARY TRACT INFECTION, SITE NOT SPECIFIED: ICD-10-CM

## 2024-04-17 DIAGNOSIS — E04.9 NONTOXIC GOITER, UNSPECIFIED: ICD-10-CM

## 2024-04-17 DIAGNOSIS — Z90.710 ACQUIRED ABSENCE OF BOTH CERVIX AND UTERUS: ICD-10-CM

## 2024-04-17 DIAGNOSIS — R53.83 OTHER FATIGUE: ICD-10-CM

## 2024-04-17 DIAGNOSIS — B96.20 UNSPECIFIED ESCHERICHIA COLI [E. COLI] AS THE CAUSE OF DISEASES CLASSIFIED ELSEWHERE: ICD-10-CM

## 2024-04-17 DIAGNOSIS — R29.704 NIHSS SCORE 4: ICD-10-CM

## 2024-04-17 DIAGNOSIS — Z86.73 PERSONAL HISTORY OF TRANSIENT ISCHEMIC ATTACK (TIA), AND CEREBRAL INFARCTION WITHOUT RESIDUAL DEFICITS: ICD-10-CM

## 2024-04-17 DIAGNOSIS — E87.20 ACIDOSIS, UNSPECIFIED: ICD-10-CM

## 2024-04-17 DIAGNOSIS — I63.9 CEREBRAL INFARCTION, UNSPECIFIED: ICD-10-CM

## 2024-04-17 DIAGNOSIS — I10 ESSENTIAL (PRIMARY) HYPERTENSION: ICD-10-CM

## 2024-04-17 DIAGNOSIS — E11.65 TYPE 2 DIABETES MELLITUS WITH HYPERGLYCEMIA: ICD-10-CM

## 2024-04-18 ENCOUNTER — APPOINTMENT (OUTPATIENT)
Dept: GERIATRICS | Facility: CLINIC | Age: 84
End: 2024-04-18

## 2024-05-07 NOTE — DISCHARGE NOTE PROVIDER - NSDCCPCAREPLAN_GEN_ALL_CORE_FT
Discharge paperwork reviewed and provided to pt. Time was given to ask any questions or concerns which there were none att.   PRINCIPAL DISCHARGE DIAGNOSIS  Diagnosis: Hypertensive urgency  Assessment and Plan of Treatment: You presented to the hospital with high blood pressure. Tropronin, a marker for heart damage was negative. Your blood pressure was monitored, and you were started on antihypertensive medication. Please continue Losartan-Hydrochlorathiazide as directed for your blood pressure. See your PCP within two weeks of discharge for your hypertension.         SECONDARY DISCHARGE DIAGNOSES  Diagnosis: Sinus bradycardia  Assessment and Plan of Treatment: Your heart rate was low on admission and you came in for a fall. Your heart was monitored on tele. Thyroid stimulating hormone was normal. Your Donepezlil was discontinued due to possible side effect of bradycardia. Please see a cardiologist if symptoms return.    Diagnosis: Fall  Assessment and Plan of Treatment: You presented to the ED after a fall. CT scan of your head was negative for acute hemmorhage, or fracture. There was incidental finding of lung nodule and thyroid nodule. Thyroid stimulating hormone levels were normal. Please see your PCP within one month for your nodules. Physical therapy was consulted.    Diagnosis: Diabetes mellitus  Assessment and Plan of Treatment: You have a history of diabetes, on home Janumet, and glimeperide. Your glucose was managed with insulin during hospitalization. You may continue Janumet, and glimeperide.     PRINCIPAL DISCHARGE DIAGNOSIS  Diagnosis: Hypertensive urgency  Assessment and Plan of Treatment: You presented to the hospital with high blood pressure. Tropronin, a marker for heart damage was negative. Your blood pressure was monitored, and you were started on antihypertensive medication. Your losartan was increased to 100mg daily. Your hydrochlorathiazide was increased to 25mg daily. Please take these medications as directed for your high blood pressure. See your PCP within two weeks of discharge for your hypertension.         SECONDARY DISCHARGE DIAGNOSES  Diagnosis: Sinus bradycardia  Assessment and Plan of Treatment: Your heart rate was low on admission and you came in for a fall. Your heart was monitored on tele. Tele showed some bradycardia (slow heartrate) but you were asymptomatic. No other significant events were noted on tele monitoring of your heart. Thyroid stimulating hormone was normal. Your Donepezlil was discontinued due to possible side effect of bradycardia. Please see a cardiologist if symptoms return.    Diagnosis: Fall  Assessment and Plan of Treatment: You presented to the ED after a fall. CT scan of your head was negative for acute hemmorhage, or fracture. There was incidental finding of lung nodule and thyroid nodule. Thyroid stimulating hormone levels were normal. Xrays were negative for fracture. Please see your PCP within one month for your nodules. Physical therapy was consulted.    Diagnosis: Diabetes mellitus  Assessment and Plan of Treatment: You have a history of diabetes, on home Janumet, and glimeperide. Your glucose was managed with insulin during hospitalization. You may continue Janumet, and glimeperide.     PRINCIPAL DISCHARGE DIAGNOSIS  Diagnosis: Hypertensive urgency  Assessment and Plan of Treatment: You presented to the hospital with high blood pressure. Tropronin, a marker for heart damage was negative. Your blood pressure was monitored, and you were started on antihypertensive medication. Your losartan was increased to 100mg daily. Your hydrochlorathiazide was increased to 25mg daily. Please take these medications as directed for your high blood pressure. See your PCP within two weeks of discharge for your hypertension.         SECONDARY DISCHARGE DIAGNOSES  Diagnosis: Sinus bradycardia  Assessment and Plan of Treatment: Your heart rate was low on admission and you came in for a fall. Your heart was monitored on tele. Tele showed some bradycardia (slow heartrate) but you were asymptomatic. No other significant events were noted on tele monitoring of your heart. Thyroid stimulating hormone was normal. Your Donepezlil was discontinued due to possible side effect of bradycardia. Please see a cardiologist if symptoms return.    Diagnosis: Fall  Assessment and Plan of Treatment: You presented to the ED after a fall. CT scan of your head was negative for acute hemmorhage, or fracture. There was incidental finding of lung nodule and thyroid nodule. Thyroid stimulating hormone levels were normal. Xrays were negative for fracture. Please see your PCP within one month for your nodules. Physical therapy was consulted.    Diagnosis: Diabetes mellitus  Assessment and Plan of Treatment: You have a history of diabetes, on home Janumet, and glimeperide. Your glucose was managed with insulin during hospitalization. You may continue Janumet, and glimeperide.    Diagnosis: Dementia  Assessment and Plan of Treatment: You have history of dementia. Your Donepezil, a medication for dementia, was discontinued. Please DO NOT take Donepezil, due to side effect of bradycardia. See your PCP within two weeks for your dementia.     PRINCIPAL DISCHARGE DIAGNOSIS  Diagnosis: Hypertensive urgency  Assessment and Plan of Treatment: You presented to the hospital with high blood pressure. Tropronin, a marker for heart damage was negative. Your blood pressure was monitored, and you were started on antihypertensive medication. Your losartan was increased to 100mg daily. Your hydrochlorathiazide was increased to 25mg daily. Please take these medications as directed for your high blood pressure. See your PCP within one weeks of discharge for your hypertension and get a repeat BMP in one week of discharge.         SECONDARY DISCHARGE DIAGNOSES  Diagnosis: Sinus bradycardia  Assessment and Plan of Treatment: Your heart rate was low on admission and you came in for a fall. Your heart was monitored on tele. Tele showed some bradycardia (slow heartrate) but you were asymptomatic. No other significant events were noted on tele monitoring of your heart. Thyroid stimulating hormone was normal. Your Donepezlil was discontinued due to possible side effect of bradycardia. Please see a cardiologist if symptoms return.    Diagnosis: Fall  Assessment and Plan of Treatment: You presented to the ED after a fall. CT scan of your head was negative for acute hemmorhage, or fracture. There was incidental finding of lung nodule and thyroid nodule. Thyroid stimulating hormone levels were normal. Xrays were negative for fracture. Please see your PCP within one month for your nodules. Physical therapy was consulted.    Diagnosis: Diabetes mellitus  Assessment and Plan of Treatment: You have a history of diabetes, on home Janumet, and glimeperide. Your glucose was managed with insulin during hospitalization. You may continue Janumet, and glimeperide.    Diagnosis: Dementia  Assessment and Plan of Treatment: You have history of dementia. Your Donepezil, a medication for dementia, was discontinued. Please DO NOT take Donepezil, due to side effect of bradycardia. You may continue memantine. See your PCP within two weeks for your dementia.     PRINCIPAL DISCHARGE DIAGNOSIS  Diagnosis: Hypertensive urgency  Assessment and Plan of Treatment: You presented to the hospital with high blood pressure. Tropronin, a marker for heart damage was negative. Your blood pressure was monitored, and you were started on antihypertensive medication. Your losartan was increased to 100mg daily. Your hydrochlorathiazide was increased to 25mg daily. Please take these medications as directed for your high blood pressure. See your PCP within one weeks of discharge for your hypertension and get a repeat BMP in one week of discharge.         SECONDARY DISCHARGE DIAGNOSES  Diagnosis: Fall  Assessment and Plan of Treatment: You presented to the ED after a fall. CT scan of your head was negative for acute hemmorhage, or fracture. There was incidental finding of lung nodule and thyroid nodule. Thyroid stimulating hormone levels were normal. Xrays were negative for fracture. Please see your PCP within one month for your nodules. Physical therapy was consulted.    Diagnosis: Sinus bradycardia  Assessment and Plan of Treatment: Your heart rate was low on admission and you came in for a fall. Your heart was monitored on tele. Tele showed some bradycardia (slow heartrate) but you were asymptomatic. No other significant events were noted on tele monitoring of your heart. Thyroid stimulating hormone was normal. Your Donepezlil was discontinued due to possible side effect of bradycardia. Please see a cardiologist if symptoms return.    Diagnosis: Diabetes mellitus  Assessment and Plan of Treatment: You have a history of diabetes, on home Janumet, and glimeperide. Your glucose was managed with insulin during hospitalization. You may continue Janumet, and glimeperide.  Reccomend the DASH Diet that is Carbohydarte consistent due to diabetes:  This emphasizes vegetables, fruits, and fat-free or low-fat dairy products.  Includes whole grains, fish, poultry, beans, seeds, nuts, and vegetable oils. Please limit sodium, sweets, sugary beverages, and red meats.    Diagnosis: Dementia  Assessment and Plan of Treatment: You have history of dementia. Your Donepezil, a medication for dementia, was discontinued. Please DO NOT take Donepezil, due to side effect of bradycardia. You may continue memantine. See your PCP within two weeks for your dementia.    Diagnosis: Lung nodule  Assessment and Plan of Treatment:     Diagnosis: Thyroid nodule  Assessment and Plan of Treatment:      PRINCIPAL DISCHARGE DIAGNOSIS  Diagnosis: Hypertensive urgency  Assessment and Plan of Treatment: You presented to the hospital with high blood pressure. Tropronin, a marker for heart damage was negative. Your blood pressure was monitored, and you were started on antihypertensive medication. Your losartan was increased to 100mg daily. Your hydrochlorathiazide was increased to 25mg daily. Please take these medications as directed for your high blood pressure. See your PCP within one weeks of discharge for your hypertension and get a repeat BMP in one week of discharge in order to see your kidney function.         SECONDARY DISCHARGE DIAGNOSES  Diagnosis: Fall  Assessment and Plan of Treatment: You presented to the ED after a fall. CT scan of your head was negative for acute hemmorhage, or fracture. CT cervical spine was negative for fracture. . Xrays were negative for fracture. Please see your PCP within one month for your nodules. Physical therapy was consulted.    Diagnosis: Sinus bradycardia  Assessment and Plan of Treatment: Your heart rate was low on admission and you came in for a fall. Your heart was monitored on tele. Tele showed some bradycardia (slow heartrate) but you were asymptomatic. No other significant events were noted on tele monitoring of your heart. Thyroid stimulating hormone was normal. Your Donepezlil was discontinued due to possible side effect of bradycardia. Please see a cardiologist if symptoms return.    Diagnosis: Diabetes mellitus  Assessment and Plan of Treatment: You have a history of diabetes, on home Janumet, and glimeperide. Your glucose was managed with insulin during hospitalization. You may continue Janumet, and glimeperide.  Reccomend the DASH Diet that is Carbohydarte consistent due to diabetes:  This emphasizes vegetables, fruits, and fat-free or low-fat dairy products.  Includes whole grains, fish, poultry, beans, seeds, nuts, and vegetable oils. Please limit sodium, sweets, sugary beverages, and red meats.    Diagnosis: Dementia  Assessment and Plan of Treatment: You have history of dementia. Your Donepezil, a medication for dementia, was discontinued. Please DO NOT take Donepezil, due to side effect of bradycardia. You may continue memantine. See your PCP within two weeks for your dementia.    Diagnosis: Lung nodule  Assessment and Plan of Treatment: CT chest showed incidentally that you have 2 mm left upper lobe pulmonary nodule on image . Given that you were a former smoker You might need a repeat CT chest in one year or so.Please follow up with your family doctor for further management    Diagnosis: Thyroid nodule  Assessment and Plan of Treatment: There was incidental finding of  thyroid nodule on CT chest. Thyroid stimulating hormone level was normal. Follow up with your family doctor for further care. You possibly  will need thyroid scan which could be done outpatient.     PRINCIPAL DISCHARGE DIAGNOSIS  Diagnosis: Hypertensive urgency  Assessment and Plan of Treatment: You presented to the hospital after a mechanical fall while walking with your daughter in the rain and you was noted to have significantly elevated. Troponin, a marker for heart damage was negative. Your blood pressure was monitored, and you were started on antihypertensive medication. Your losartan was increased to 100mg daily. Your hydrochlorathiazide was increased to 25mg daily. Please take these medications as directed for your high blood pressure. See your PCP within one weeks of discharge for your hypertension and get a repeat BMP in one week of discharge in order to see your kidney function while on Losartan and increased dose of water pill.         SECONDARY DISCHARGE DIAGNOSES  Diagnosis: Sinus bradycardia  Assessment and Plan of Treatment: Your heart rate was noted to be low in rhe 40s on admission and you came in for a fall. Your heart was monitored on tele. Tele showed some bradycardia (slow heartrate) but you were asymptomatic or unable to offer complaints due to dementia. No other significant events were noted on tele monitoring of your heart. Thyroid stimulating hormone was normal. Please note that one of the Dementia medication could cause low Heart rate and hence Donepezlil was discontinued. We spoke with your Neurologist Dr. Moss who agreed and we also updated your daughter who agreed with the plan.   Please see a cardiologist if symptoms return.    Diagnosis: Fall  Assessment and Plan of Treatment: You presented to the ED after a fall. CT scan of your head was negative for acute hemmorhage, or fracture. CT cervical spine was negative for fracture. . Xrays were negative for fracture. Please see your PCP within one month for your nodules. Physical therapy was consulted.    Diagnosis: Diabetes mellitus  Assessment and Plan of Treatment: You have a history of diabetes, on home Janumet, and Glimeperide. Your glucose was managed with insulin during hospitalization. You may continue Janumet, and glimeperide. Your A1c was noted to be 8.3 which indicates poorly controlled. This is likely due to dietary non compliance.   Please follow up with your PCP and Endocrinologist as outpatient to adjust medications.   Reccomend the DASH Diet that is Carbohydarte consistent due to diabetes:  This emphasizes vegetables, fruits, and fat-free or low-fat dairy products.  Includes whole grains, fish, poultry, beans, seeds, nuts, and vegetable oils. Please limit sodium, sweets, sugary beverages, and red meats.    Diagnosis: Dementia  Assessment and Plan of Treatment: You have history of dementia appears to be moderate with some behavioral disturbance and has tendency to wander off. Your Donepezil, a medication for dementia, was discontinued due to potential to cause low Heart rate  Please DO NOT take Donepezil, due to side effect of bradycardia. You may continue memantine. See your PCP within two weeks for your dementia.  YOU HAVE MODERATE DEMENTIA AND HAS TENDENCY TO WANDER OFF INTO THE STREETS AT TIMES FOR FEW HOURS WHEN FAMILY NOT AVAILABLE. PATIENT WOULD BENEFIT FORM INCREASED HOME HEALTH AIDE HOURS TO PREVENT HARM AND ACCIDENTAL INJURY.    Diagnosis: Lung nodule  Assessment and Plan of Treatment: CT chest showed incidentally that you have 2 mm left upper lobe pulmonary nodule on image . Given that you were a former smoker You might need a repeat CT chest in one year or so.Please follow up with your family doctor for further management    Diagnosis: Thyroid nodule  Assessment and Plan of Treatment: There was incidental finding of  thyroid nodule on CT chest. Thyroid stimulating hormone level was normal. Follow up with your family doctor for further care. You possibly  will need thyroid scan which could be done outpatient.     PRINCIPAL DISCHARGE DIAGNOSIS  Diagnosis: Hypertensive urgency  Assessment and Plan of Treatment: You presented to the hospital after a mechanical fall while walking with your daughter in the rain and you was noted to have significantly elevated. Troponin, a marker for heart damage was negative. Your blood pressure was monitored, and you were started on antihypertensive medication. Your losartan was increased to 100mg daily. Your hydrochlorathiazide was increased to 25mg daily. Please take these medications as directed for your high blood pressure. See your PCP within one weeks of discharge for your hypertension and get a repeat BMP in one week of discharge in order to see your kidney function while on Losartan and increased dose of water pill.         SECONDARY DISCHARGE DIAGNOSES  Diagnosis: Sinus bradycardia  Assessment and Plan of Treatment: Your heart rate was noted to be low in rhe 40s on admission and you came in for a fall. Your heart was monitored on tele. Tele showed some bradycardia (slow heartrate) but you were asymptomatic or unable to offer complaints due to dementia. No other significant events were noted on tele monitoring of your heart. Thyroid stimulating hormone was normal. Please note that one of the Dementia medication could cause low Heart rate and hence Donepezlil was discontinued. We spoke with your Neurologist Dr. Moss who agreed and we also updated your daughter who agreed with the plan.   Please see a cardiologist if symptoms return.    Diagnosis: Fall  Assessment and Plan of Treatment: You presented to the ED after a fall. CT scan of your head was negative for acute hemmorhage, or fracture. CT cervical spine was negative for fracture. . Xrays were negative for fracture. Please see your PCP within one month for your nodules. Physical therapy was consulted.    Diagnosis: Diabetes mellitus  Assessment and Plan of Treatment: You have a history of diabetes, on home Janumet, and Glimeperide. Your glucose was managed with insulin during hospitalization. You may continue Janumet, and glimeperide. Your A1c was noted to be 8.3 which indicates poorly controlled. This is likely due to dietary non compliance.   Please follow up with your PCP and Endocrinologist as outpatient to adjust medications.   Reccomend the DASH Diet that is Carbohydarte consistent due to diabetes:  This emphasizes vegetables, fruits, and fat-free or low-fat dairy products.  Includes whole grains, fish, poultry, beans, seeds, nuts, and vegetable oils. Please limit sodium, sweets, sugary beverages, and red meats.    Diagnosis: Dementia  Assessment and Plan of Treatment: You have history of dementia appears to be moderate with some behavioral disturbance and has tendency to wander off. Your Donepezil, a medication for dementia, was discontinued due to potential to cause low Heart rate  Please DO NOT take Donepezil, due to side effect of bradycardia. You may continue memantine. See your PCP within two weeks for your dementia.  YOU HAVE MODERATE DEMENTIA AND HAS TENDENCY TO WANDER OFF INTO THE STREETS AT TIMES FOR FEW HOURS WHEN FAMILY NOT AVAILABLE. PATIENT WOULD BENEFIT FORM INCREASED HOME HEALTH AIDE HOURS TO PREVENT HARM AND ACCIDENTAL INJURY.    Diagnosis: Lung nodule  Assessment and Plan of Treatment: CT chest showed incidentally that you have 2 mm left upper lobe pulmonary nodule on image . Given that you were a former smoker You might need a repeat CT chest in one year or so to monitor. Please follow up with your family doctor for further management    Diagnosis: Thyroid nodule  Assessment and Plan of Treatment: There was incidental finding of  thyroid nodule on CT chest. Thyroid stimulating hormone level was normal. Follow up with your family doctor for further care. You possibly  will need thyroid scan which could be done outpatient.

## 2024-05-13 ENCOUNTER — NON-APPOINTMENT (OUTPATIENT)
Age: 84
End: 2024-05-13

## 2024-06-03 ENCOUNTER — APPOINTMENT (OUTPATIENT)
Dept: GERIATRICS | Facility: CLINIC | Age: 84
End: 2024-06-03

## 2024-06-26 ENCOUNTER — NON-APPOINTMENT (OUTPATIENT)
Age: 84
End: 2024-06-26

## 2024-07-05 ENCOUNTER — TRANSCRIPTION ENCOUNTER (OUTPATIENT)
Age: 84
End: 2024-07-05

## 2024-08-01 ENCOUNTER — NON-APPOINTMENT (OUTPATIENT)
Age: 84
End: 2024-08-01

## 2024-08-03 NOTE — CONSULT NOTE ADULT - PROBLEM SELECTOR PROBLEM 1
Is This A New Presentation, Or A Follow-Up?: Basal Cell Carcinoma
When Was Basal Cell Biopsied? (Optional): 07/09/2024
Accession # (Optional): [id number]
Transaminitis

## 2024-08-08 ENCOUNTER — NON-APPOINTMENT (OUTPATIENT)
Age: 84
End: 2024-08-08

## 2024-08-09 ENCOUNTER — NON-APPOINTMENT (OUTPATIENT)
Age: 84
End: 2024-08-09

## 2024-08-14 ENCOUNTER — NON-APPOINTMENT (OUTPATIENT)
Age: 84
End: 2024-08-14

## 2024-09-11 ENCOUNTER — NON-APPOINTMENT (OUTPATIENT)
Age: 84
End: 2024-09-11

## 2025-05-05 NOTE — ED ADULT NURSE NOTE - OBJECTIVE STATEMENT
Message left for patient to return call to clinic   BIBEMS for R sided weakness with deficit and s/p fall yesterday times three  and today and she hit her face against the dresser and have ecchymosis to the rt side of the eye . h/o TIA's, HTN, Dm, dementia, neuropathy